# Patient Record
Sex: MALE | Race: WHITE | NOT HISPANIC OR LATINO | Employment: OTHER | URBAN - NONMETROPOLITAN AREA
[De-identification: names, ages, dates, MRNs, and addresses within clinical notes are randomized per-mention and may not be internally consistent; named-entity substitution may affect disease eponyms.]

---

## 2019-07-07 ENCOUNTER — APPOINTMENT (OUTPATIENT)
Dept: GENERAL RADIOLOGY | Facility: HOSPITAL | Age: 64
End: 2019-07-07

## 2019-07-07 ENCOUNTER — HOSPITAL ENCOUNTER (EMERGENCY)
Facility: HOSPITAL | Age: 64
Discharge: HOME | End: 2019-07-07
Attending: EMERGENCY MEDICINE | Admitting: EMERGENCY MEDICINE

## 2019-07-07 VITALS
OXYGEN SATURATION: 97 % | RESPIRATION RATE: 18 BRPM | WEIGHT: 190 LBS | SYSTOLIC BLOOD PRESSURE: 139 MMHG | HEIGHT: 66 IN | BODY MASS INDEX: 30.53 KG/M2 | DIASTOLIC BLOOD PRESSURE: 88 MMHG | HEART RATE: 82 BPM | TEMPERATURE: 97.8 F

## 2019-07-07 DIAGNOSIS — R06.00 DYSPNEA, UNSPECIFIED TYPE: Primary | ICD-10-CM

## 2019-07-07 DIAGNOSIS — J44.9 CHRONIC OBSTRUCTIVE PULMONARY DISEASE, UNSPECIFIED COPD TYPE (HCC): ICD-10-CM

## 2019-07-07 LAB
ALBUMIN SERPL-MCNC: 4.1 G/DL (ref 3.5–5)
ALBUMIN/GLOB SERPL: 1.3 G/DL (ref 1.1–2.5)
ALP SERPL-CCNC: 84 U/L (ref 24–120)
ALT SERPL W P-5'-P-CCNC: 24 U/L (ref 0–54)
ANION GAP SERPL CALCULATED.3IONS-SCNC: 8 MMOL/L (ref 4–13)
AST SERPL-CCNC: 31 U/L (ref 7–45)
BASOPHILS # BLD AUTO: 0.07 10*3/MM3 (ref 0–0.2)
BASOPHILS NFR BLD AUTO: 0.7 % (ref 0–2)
BILIRUB SERPL-MCNC: 0.9 MG/DL (ref 0.1–1)
BUN BLD-MCNC: 17 MG/DL (ref 5–21)
BUN/CREAT SERPL: 17.5 (ref 7–25)
CALCIUM SPEC-SCNC: 9.3 MG/DL (ref 8.4–10.4)
CHLORIDE SERPL-SCNC: 102 MMOL/L (ref 98–110)
CO2 SERPL-SCNC: 27 MMOL/L (ref 24–31)
CREAT BLD-MCNC: 0.97 MG/DL (ref 0.5–1.4)
D DIMER PPP FEU-MCNC: 0.42 MG/L (FEU) (ref 0–0.5)
DEPRECATED RDW RBC AUTO: 38.7 FL (ref 40–54)
EOSINOPHIL # BLD AUTO: 0.07 10*3/MM3 (ref 0–0.7)
EOSINOPHIL NFR BLD AUTO: 0.7 % (ref 0–4)
ERYTHROCYTE [DISTWIDTH] IN BLOOD BY AUTOMATED COUNT: 12.7 % (ref 12–15)
GFR SERPL CREATININE-BSD FRML MDRD: 78 ML/MIN/1.73
GLOBULIN UR ELPH-MCNC: 3.1 GM/DL
GLUCOSE BLD-MCNC: 133 MG/DL (ref 70–100)
HCT VFR BLD AUTO: 49.1 % (ref 40–52)
HGB BLD-MCNC: 16.8 G/DL (ref 14–18)
IMM GRANULOCYTES # BLD AUTO: 0.05 10*3/MM3 (ref 0–0.05)
IMM GRANULOCYTES NFR BLD AUTO: 0.5 % (ref 0–5)
LYMPHOCYTES # BLD AUTO: 1.4 10*3/MM3 (ref 0.72–4.86)
LYMPHOCYTES NFR BLD AUTO: 14.7 % (ref 15–45)
MAGNESIUM SERPL-MCNC: 1.9 MG/DL (ref 1.4–2.2)
MCH RBC QN AUTO: 28.6 PG (ref 28–32)
MCHC RBC AUTO-ENTMCNC: 34.2 G/DL (ref 33–36)
MCV RBC AUTO: 83.6 FL (ref 82–95)
MONOCYTES # BLD AUTO: 0.89 10*3/MM3 (ref 0.19–1.3)
MONOCYTES NFR BLD AUTO: 9.3 % (ref 4–12)
NEUTROPHILS # BLD AUTO: 7.05 10*3/MM3 (ref 1.87–8.4)
NEUTROPHILS NFR BLD AUTO: 74.1 % (ref 39–78)
NRBC BLD AUTO-RTO: 0 /100 WBC (ref 0–0.2)
NT-PROBNP SERPL-MCNC: 793 PG/ML (ref 0–900)
PLATELET # BLD AUTO: 269 10*3/MM3 (ref 130–400)
PMV BLD AUTO: 10.4 FL (ref 6–12)
POTASSIUM BLD-SCNC: 4.4 MMOL/L (ref 3.5–5.3)
PROT SERPL-MCNC: 7.2 G/DL (ref 6.3–8.7)
RBC # BLD AUTO: 5.87 10*6/MM3 (ref 4.8–5.9)
SODIUM BLD-SCNC: 137 MMOL/L (ref 135–145)
TROPONIN I SERPL-MCNC: 0.05 NG/ML (ref 0–0.03)
TROPONIN I SERPL-MCNC: 0.05 NG/ML (ref 0–0.03)
WBC NRBC COR # BLD: 9.53 10*3/MM3 (ref 4.8–10.8)

## 2019-07-07 PROCEDURE — 36415 COLL VENOUS BLD VENIPUNCTURE: CPT

## 2019-07-07 PROCEDURE — 83735 ASSAY OF MAGNESIUM: CPT | Performed by: EMERGENCY MEDICINE

## 2019-07-07 PROCEDURE — 85025 COMPLETE CBC W/AUTO DIFF WBC: CPT | Performed by: EMERGENCY MEDICINE

## 2019-07-07 PROCEDURE — 93010 ELECTROCARDIOGRAM REPORT: CPT | Performed by: INTERNAL MEDICINE

## 2019-07-07 PROCEDURE — 84484 ASSAY OF TROPONIN QUANT: CPT | Performed by: EMERGENCY MEDICINE

## 2019-07-07 PROCEDURE — 96375 TX/PRO/DX INJ NEW DRUG ADDON: CPT

## 2019-07-07 PROCEDURE — 71045 X-RAY EXAM CHEST 1 VIEW: CPT

## 2019-07-07 PROCEDURE — 85379 FIBRIN DEGRADATION QUANT: CPT | Performed by: EMERGENCY MEDICINE

## 2019-07-07 PROCEDURE — 25010000002 METHYLPREDNISOLONE PER 125 MG: Performed by: EMERGENCY MEDICINE

## 2019-07-07 PROCEDURE — 83880 ASSAY OF NATRIURETIC PEPTIDE: CPT | Performed by: EMERGENCY MEDICINE

## 2019-07-07 PROCEDURE — 96374 THER/PROPH/DIAG INJ IV PUSH: CPT

## 2019-07-07 PROCEDURE — 25010000002 LORAZEPAM PER 2 MG: Performed by: EMERGENCY MEDICINE

## 2019-07-07 PROCEDURE — 80053 COMPREHEN METABOLIC PANEL: CPT | Performed by: EMERGENCY MEDICINE

## 2019-07-07 PROCEDURE — 99284 EMERGENCY DEPT VISIT MOD MDM: CPT

## 2019-07-07 PROCEDURE — 93005 ELECTROCARDIOGRAM TRACING: CPT | Performed by: EMERGENCY MEDICINE

## 2019-07-07 RX ORDER — SODIUM CHLORIDE 0.9 % (FLUSH) 0.9 %
10 SYRINGE (ML) INJECTION AS NEEDED
Status: DISCONTINUED | OUTPATIENT
Start: 2019-07-07 | End: 2019-07-07 | Stop reason: HOSPADM

## 2019-07-07 RX ORDER — LORAZEPAM 2 MG/ML
1 INJECTION INTRAMUSCULAR ONCE
Status: COMPLETED | OUTPATIENT
Start: 2019-07-07 | End: 2019-07-07

## 2019-07-07 RX ORDER — CLONIDINE HYDROCHLORIDE 0.1 MG/1
0.2 TABLET ORAL ONCE
Status: COMPLETED | OUTPATIENT
Start: 2019-07-07 | End: 2019-07-07

## 2019-07-07 RX ORDER — PREDNISONE 20 MG/1
20 TABLET ORAL 2 TIMES DAILY
Qty: 14 TABLET | Refills: 0 | Status: SHIPPED | OUTPATIENT
Start: 2019-07-07 | End: 2022-05-25

## 2019-07-07 RX ORDER — LISINOPRIL 20 MG/1
20 TABLET ORAL DAILY
Qty: 30 TABLET | Refills: 0 | Status: SHIPPED | OUTPATIENT
Start: 2019-07-07 | End: 2022-07-27

## 2019-07-07 RX ORDER — ALBUTEROL SULFATE 90 UG/1
2 AEROSOL, METERED RESPIRATORY (INHALATION) EVERY 4 HOURS PRN
Qty: 1 INHALER | Refills: 0 | Status: SHIPPED | OUTPATIENT
Start: 2019-07-07

## 2019-07-07 RX ORDER — METOPROLOL TARTRATE 50 MG/1
50 TABLET, FILM COATED ORAL 2 TIMES DAILY
Qty: 60 TABLET | Refills: 0 | Status: SHIPPED | OUTPATIENT
Start: 2019-07-07 | End: 2022-05-25

## 2019-07-07 RX ORDER — METHYLPREDNISOLONE SODIUM SUCCINATE 125 MG/2ML
125 INJECTION, POWDER, LYOPHILIZED, FOR SOLUTION INTRAMUSCULAR; INTRAVENOUS ONCE
Status: COMPLETED | OUTPATIENT
Start: 2019-07-07 | End: 2019-07-07

## 2019-07-07 RX ADMIN — NITROGLYCERIN 1 INCH: 20 OINTMENT TOPICAL at 12:07

## 2019-07-07 RX ADMIN — LORAZEPAM 1 MG: 2 INJECTION INTRAMUSCULAR; INTRAVENOUS at 12:07

## 2019-07-07 RX ADMIN — METHYLPREDNISOLONE SODIUM SUCCINATE 125 MG: 125 INJECTION, POWDER, FOR SOLUTION INTRAMUSCULAR; INTRAVENOUS at 14:08

## 2019-07-07 RX ADMIN — CLONIDINE HYDROCHLORIDE 0.2 MG: 0.1 TABLET ORAL at 10:34

## 2019-07-07 NOTE — ED PROVIDER NOTES
Subjective   Patient complains of increasing shortness of breath since yesterday and said it was bad last night.  He could not lay down because of the shortness of breath.  He says he has been told he had congestive heart failure in the past and was put on fluid pills for period of time but his regular physician has not continued that.  He has been out of his blood pressure medicine because he has not made his trip back to his family physician.  He does smoke.  Where he could be to be checked out.        History provided by:  Patient and spouse   used: No    Shortness of Breath   Severity:  Moderate  Onset quality:  Gradual  Duration:  1 day  Timing:  Constant  Progression:  Worsening  Chronicity:  Recurrent  Context: not activity, not animal exposure, not emotional upset, not fumes, not known allergens, not occupational exposure, not pollens, not smoke exposure, not strong odors, not URI and not weather changes    Relieved by:  Nothing  Exacerbated by: laying supine.  Ineffective treatments:  None tried  Associated symptoms: PND    Associated symptoms: no abdominal pain, no chest pain, no claudication, no cough, no diaphoresis, no ear pain, no fever, no headaches, no hemoptysis, no neck pain, no rash, no sore throat, no sputum production, no syncope, no swollen glands, no vomiting and no wheezing    Risk factors: prolonged immobilization and tobacco use    Risk factors: no recent alcohol use, no family hx of DVT, no hx of cancer, no hx of PE/DVT, no obesity, no oral contraceptive use and no recent surgery        Review of Systems   Constitutional: Negative.  Negative for diaphoresis and fever.   HENT: Negative.  Negative for ear pain and sore throat.    Respiratory: Positive for shortness of breath. Negative for cough, hemoptysis, sputum production and wheezing.    Cardiovascular: Positive for PND. Negative for chest pain, claudication and syncope.   Gastrointestinal: Negative.  Negative for  abdominal pain and vomiting.   Genitourinary: Negative.    Musculoskeletal: Negative.  Negative for neck pain.   Skin: Negative for rash.   Neurological: Negative.  Negative for headaches.   Psychiatric/Behavioral: Negative.    All other systems reviewed and are negative.      No past medical history on file.    No Known Allergies    No past surgical history on file.    No family history on file.    Social History     Socioeconomic History   • Marital status: Single     Spouse name: Not on file   • Number of children: Not on file   • Years of education: Not on file   • Highest education level: Not on file       Prior to Admission medications    Not on File       Medications   sodium chloride 0.9 % flush 10 mL (not administered)   CloNIDine (CATAPRES) tablet 0.2 mg (0.2 mg Oral Given 7/7/19 1034)   nitroglycerin (NITROSTAT) ointment 1 inch (1 inch Topical Given 7/7/19 1207)   LORazepam (ATIVAN) injection 1 mg (1 mg Intravenous Given 7/7/19 1207)   methylPREDNISolone sodium succinate (SOLU-Medrol) injection 125 mg (125 mg Intravenous Given 7/7/19 1408)       Vitals:    07/07/19 1402   BP: 151/89   Pulse: 88   Resp:    Temp:    SpO2: 95%         Objective   Physical Exam   Constitutional: He is oriented to person, place, and time. He appears well-developed and well-nourished.   HENT:   Head: Normocephalic and atraumatic.   Neck: Normal range of motion. Neck supple.   Cardiovascular: Normal rate and normal heart sounds.   Pulmonary/Chest: Effort normal. He has rales in the right lower field and the left lower field.   Abdominal: Soft. Bowel sounds are normal.   Musculoskeletal: Normal range of motion.        Right lower leg: Normal.   Neurological: He is alert and oriented to person, place, and time.   Psychiatric: He has a normal mood and affect. His behavior is normal.   Nursing note and vitals reviewed.      Procedures         Lab Results (last 24 hours)     Procedure Component Value Units Date/Time    CBC &  Differential [[id number]] Collected:  07/07/19 1035    Specimen:  Blood Updated:  07/07/19 1117    Narrative:       The following orders were created for panel order CBC & Differential.  Procedure                               Abnormality         Status                     ---------                               -----------         ------                     CBC Auto Differential[[id number]]         Abnormal            Final result                 Please view results for these tests on the individual orders.    Comprehensive Metabolic Panel [[id number]]  (Abnormal) Collected:  07/07/19 1035    Specimen:  Blood from Hand, Left Updated:  07/07/19 1128     Glucose 133 mg/dL      BUN 17 mg/dL      Creatinine 0.97 mg/dL      Sodium 137 mmol/L      Potassium 4.4 mmol/L      Chloride 102 mmol/L      CO2 27.0 mmol/L      Calcium 9.3 mg/dL      Total Protein 7.2 g/dL      Albumin 4.10 g/dL      ALT (SGPT) 24 U/L      AST (SGOT) 31 U/L      Alkaline Phosphatase 84 U/L      Total Bilirubin 0.9 mg/dL      eGFR Non African Amer 78 mL/min/1.73      Globulin 3.1 gm/dL      A/G Ratio 1.3 g/dL      BUN/Creatinine Ratio 17.5     Anion Gap 8.0 mmol/L     Narrative:       GFR Normal >60  Chronic Kidney Disease <60  Kidney Failure <15    D-dimer, Quantitative [[id number]]  (Normal) Collected:  07/07/19 1035    Specimen:  Blood from Hand, Left Updated:  07/07/19 1127     D-Dimer, Quantitative 0.42 mg/L (FEU)     Narrative:       Reference Range is 0-0.50 mg/L FEU. However, results <0.50 mg/L FEU tends to rule out DVT or PE. Results >0.50 mg/L FEU are not useful in predicting absence or presence of DVT or PE.    BNP [[id number]]  (Normal) Collected:  07/07/19 1035    Specimen:  Blood from Hand, Left Updated:  07/07/19 1142     proBNP 793.0 pg/mL     Troponin [[id number]]  (Abnormal) Collected:  07/07/19 1035    Specimen:  Blood from Hand, Left Updated:  07/07/19 1142     Troponin I 0.054 ng/mL     Magnesium [[id number]]  (Normal) Collected:  07/07/19  1035    Specimen:  Blood from Hand, Left Updated:  07/07/19 1128     Magnesium 1.9 mg/dL     CBC Auto Differential [[id number]]  (Abnormal) Collected:  07/07/19 1035    Specimen:  Blood from Hand, Left Updated:  07/07/19 1117     WBC 9.53 10*3/mm3      RBC 5.87 10*6/mm3      Hemoglobin 16.8 g/dL      Hematocrit 49.1 %      MCV 83.6 fL      MCH 28.6 pg      MCHC 34.2 g/dL      RDW 12.7 %      RDW-SD 38.7 fl      MPV 10.4 fL      Platelets 269 10*3/mm3      Neutrophil % 74.1 %      Lymphocyte % 14.7 %      Monocyte % 9.3 %      Eosinophil % 0.7 %      Basophil % 0.7 %      Immature Grans % 0.5 %      Neutrophils, Absolute 7.05 10*3/mm3      Lymphocytes, Absolute 1.40 10*3/mm3      Monocytes, Absolute 0.89 10*3/mm3      Eosinophils, Absolute 0.07 10*3/mm3      Basophils, Absolute 0.07 10*3/mm3      Immature Grans, Absolute 0.05 10*3/mm3      nRBC 0.0 /100 WBC     Troponin [[id number]]  (Abnormal) Collected:  07/07/19 1309    Specimen:  Blood Updated:  07/07/19 1348     Troponin I 0.054 ng/mL           XR Chest 1 View   Final Result   1. No acute cardiopulmonary process.       This report was finalized on 07/07/2019 10:51 by Dr. Raghavendra Cross MD.          ED Course  ED Course as of Jul 07 1415   Sun Jul 07, 2019   1414 Told the patient that his troponin has stayed stable that should not be a problem.  His chest x-ray and BNP tell me it is not congestive heart failure.  I feel like this has to be related to his smoking and COPD.  We will treat him as such.  He is feeling better now so he will be discharged in stable condition.  [TR]      ED Course User Index  [TR] Silver De Los Santos Jr., MD          MDM  Number of Diagnoses or Management Options  Chronic obstructive pulmonary disease, unspecified COPD type (CMS/HCC): new and requires workup  Dyspnea, unspecified type: new and requires workup     Amount and/or Complexity of Data Reviewed  Clinical lab tests: ordered and reviewed  Tests in the radiology section of CPT®: ordered  and reviewed  Tests in the medicine section of CPT®: ordered and reviewed  Decide to obtain previous medical records or to obtain history from someone other than the patient: yes    Risk of Complications, Morbidity, and/or Mortality  Presenting problems: moderate  Diagnostic procedures: moderate  Management options: moderate    Patient Progress  Patient progress: stable      Final diagnoses:   Dyspnea, unspecified type   Chronic obstructive pulmonary disease, unspecified COPD type (CMS/[facility])          Silver De Los Santos Jr., MD  07/07/19 7339

## 2020-08-27 ENCOUNTER — TRANSCRIBE ORDERS (OUTPATIENT)
Dept: ADMINISTRATIVE | Facility: HOSPITAL | Age: 65
End: 2020-08-27

## 2020-08-27 DIAGNOSIS — F17.200 TOBACCO USE DISORDER: Primary | ICD-10-CM

## 2020-09-02 ENCOUNTER — HOSPITAL ENCOUNTER (OUTPATIENT)
Dept: CT IMAGING | Facility: HOSPITAL | Age: 65
Discharge: HOME | End: 2020-09-02
Admitting: NURSE PRACTITIONER

## 2020-09-02 ENCOUNTER — DOCUMENTATION (OUTPATIENT)
Dept: CT IMAGING | Facility: HOSPITAL | Age: 65
End: 2020-09-02

## 2020-09-02 DIAGNOSIS — F17.200 TOBACCO USE DISORDER: ICD-10-CM

## 2020-09-02 PROCEDURE — G0297 LDCT FOR LUNG CA SCREEN: HCPCS

## 2021-01-21 ENCOUNTER — TRANSCRIBE ORDERS (OUTPATIENT)
Dept: ADMINISTRATIVE | Facility: HOSPITAL | Age: 66
End: 2021-01-21

## 2021-01-21 DIAGNOSIS — I10 ESSENTIAL HYPERTENSION, MALIGNANT: Primary | ICD-10-CM

## 2021-01-27 ENCOUNTER — HOSPITAL ENCOUNTER (OUTPATIENT)
Dept: ULTRASOUND IMAGING | Facility: HOSPITAL | Age: 66
Discharge: HOME | End: 2021-01-27
Admitting: NURSE PRACTITIONER

## 2021-01-27 PROCEDURE — 76706 US ABDL AORTA SCREEN AAA: CPT

## 2021-10-29 ENCOUNTER — TRANSCRIBE ORDERS (OUTPATIENT)
Dept: ADMINISTRATIVE | Facility: HOSPITAL | Age: 66
End: 2021-10-29

## 2021-10-29 ENCOUNTER — TELEPHONE (OUTPATIENT)
Dept: CT IMAGING | Facility: HOSPITAL | Age: 66
End: 2021-10-29

## 2021-11-05 ENCOUNTER — TRANSCRIBE ORDERS (OUTPATIENT)
Dept: ADMINISTRATIVE | Facility: HOSPITAL | Age: 66
End: 2021-11-05

## 2021-11-05 DIAGNOSIS — Z12.2 ENCOUNTER FOR SCREENING FOR MALIGNANT NEOPLASM OF RESPIRATORY ORGANS: Primary | ICD-10-CM

## 2021-11-18 ENCOUNTER — DOCUMENTATION (OUTPATIENT)
Dept: CT IMAGING | Facility: HOSPITAL | Age: 66
End: 2021-11-18

## 2021-11-18 ENCOUNTER — HOSPITAL ENCOUNTER (OUTPATIENT)
Dept: CT IMAGING | Facility: HOSPITAL | Age: 66
Discharge: HOME | End: 2021-11-18
Admitting: NURSE PRACTITIONER

## 2021-11-18 DIAGNOSIS — Z12.2 ENCOUNTER FOR SCREENING FOR MALIGNANT NEOPLASM OF RESPIRATORY ORGANS: ICD-10-CM

## 2021-11-18 PROCEDURE — 71271 CT THORAX LUNG CANCER SCR C-: CPT

## 2021-11-19 ENCOUNTER — DOCUMENTATION (OUTPATIENT)
Dept: CT IMAGING | Facility: HOSPITAL | Age: 66
End: 2021-11-19

## 2022-02-07 ENCOUNTER — DOCUMENTATION (OUTPATIENT)
Dept: CT IMAGING | Facility: HOSPITAL | Age: 67
End: 2022-02-07

## 2022-02-07 NOTE — PROGRESS NOTES
Patient called and left me a message. I tried to return phone call to the cell phone, unable to get through. I called significant other Philomena and she said she would have him give me a call.

## 2022-04-26 ENCOUNTER — TRANSCRIBE ORDERS (OUTPATIENT)
Dept: ADMINISTRATIVE | Facility: HOSPITAL | Age: 67
End: 2022-04-26

## 2022-04-26 DIAGNOSIS — I70.211 ATHEROSCLEROSIS OF NATIVE ARTERY OF RIGHT LOWER EXTREMITY WITH INTERMITTENT CLAUDICATION: Primary | ICD-10-CM

## 2022-05-16 ENCOUNTER — APPOINTMENT (OUTPATIENT)
Dept: ULTRASOUND IMAGING | Facility: HOSPITAL | Age: 67
End: 2022-05-16

## 2022-05-18 ENCOUNTER — HOSPITAL ENCOUNTER (OUTPATIENT)
Dept: ULTRASOUND IMAGING | Facility: HOSPITAL | Age: 67
Discharge: HOME | End: 2022-05-18
Admitting: NURSE PRACTITIONER

## 2022-05-18 DIAGNOSIS — I70.211 ATHEROSCLEROSIS OF NATIVE ARTERY OF RIGHT LOWER EXTREMITY WITH INTERMITTENT CLAUDICATION: ICD-10-CM

## 2022-05-18 PROCEDURE — 93923 UPR/LXTR ART STDY 3+ LVLS: CPT

## 2022-05-24 ENCOUNTER — TELEPHONE (OUTPATIENT)
Dept: VASCULAR SURGERY | Facility: CLINIC | Age: 67
End: 2022-05-24

## 2022-05-25 ENCOUNTER — OFFICE VISIT (OUTPATIENT)
Dept: VASCULAR SURGERY | Facility: CLINIC | Age: 67
End: 2022-05-25

## 2022-05-25 VITALS
OXYGEN SATURATION: 97 % | SYSTOLIC BLOOD PRESSURE: 136 MMHG | RESPIRATION RATE: 18 BRPM | DIASTOLIC BLOOD PRESSURE: 82 MMHG | HEART RATE: 78 BPM | HEIGHT: 67 IN | WEIGHT: 190 LBS | BODY MASS INDEX: 29.82 KG/M2

## 2022-05-25 DIAGNOSIS — E78.49 OTHER HYPERLIPIDEMIA: ICD-10-CM

## 2022-05-25 DIAGNOSIS — Z72.0 TOBACCO ABUSE: ICD-10-CM

## 2022-05-25 DIAGNOSIS — I10 PRIMARY HYPERTENSION: ICD-10-CM

## 2022-05-25 DIAGNOSIS — I74.09 AORTOILIAC OCCLUSIVE DISEASE: Primary | ICD-10-CM

## 2022-05-25 PROCEDURE — 99204 OFFICE O/P NEW MOD 45 MIN: CPT | Performed by: NURSE PRACTITIONER

## 2022-05-25 RX ORDER — AMLODIPINE BESYLATE 5 MG/1
5 TABLET ORAL
COMMUNITY
Start: 2022-04-14 | End: 2023-02-28 | Stop reason: SDUPTHER

## 2022-05-25 RX ORDER — FLUOXETINE 10 MG/1
10 CAPSULE ORAL DAILY
COMMUNITY

## 2022-05-25 RX ORDER — ATORVASTATIN CALCIUM 80 MG/1
80 TABLET, FILM COATED ORAL
COMMUNITY
Start: 2022-04-14

## 2022-05-25 RX ORDER — DIAZEPAM 10 MG/1
10 TABLET ORAL 3 TIMES DAILY PRN
COMMUNITY
Start: 2022-05-13

## 2022-05-25 RX ORDER — METOPROLOL SUCCINATE 100 MG/1
100 TABLET, EXTENDED RELEASE ORAL
COMMUNITY
Start: 2022-04-14

## 2022-05-25 RX ORDER — TAMSULOSIN HYDROCHLORIDE 0.4 MG/1
1 CAPSULE ORAL
COMMUNITY
Start: 2022-04-14

## 2022-05-25 NOTE — PROGRESS NOTES
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  Pt had ABIs on 5/18/22 which showed Moderate arterial insufficiency of RLE and severe arterial insufficiency of LLE.  Pt states that his pain level is 10/10 when walking in back, hips and legs. Referred by CARYN Alfonso   • Smoker     Pt verified Current Smoker         Dear CARYN Chaves:      HPI  I had the pleasure of seeing your patient Campbell Casas in the office today.  Thank you kindly for this consultation.  As you recall, Campbell Casas is a 67 y.o.  male who you are currently following for routine health maintenance.  He does report he has been having pain to his legs for couple years but has worsened.  Upon questioning, he really describes bilateral hip and buttock pain.  He reports they just \"lock up\".  He is a current daily smoker.  He does take Lipitor daily.  He did have noninvasive testing performed, which I did review.    Past Medical History:   Diagnosis Date   • Anxiety    • Depression    • Hypertension        History reviewed. No pertinent surgical history.    History reviewed. No pertinent family history.    Social History     Socioeconomic History   • Marital status: Significant Other   Tobacco Use   • Smoking status: Current Every Day Smoker     Packs/day: 1.25     Start date: 1975   • Smokeless tobacco: Never Used   Substance and Sexual Activity   • Alcohol use: Not Currently   • Drug use: Never   • Sexual activity: Defer       No Known Allergies    Current Outpatient Medications   Medication Instructions   • albuterol sulfate  (90 Base) MCG/ACT inhaler 2 puffs, Inhalation, Every 4 Hours PRN   • amLODIPine (NORVASC) 5 mg, Oral, Every Night at Bedtime   • atorvastatin (LIPITOR) 80 mg, Oral, Every Night at Bedtime   • diazePAM (VALIUM) 10 mg, " "Oral, 3 Times Daily PRN   • FLUoxetine (PROZAC) 10 mg, Oral, Daily   • lisinopril (PRINIVIL,ZESTRIL) 20 mg, Oral, Daily   • metoprolol succinate XL (TOPROL-XL) 100 mg, Oral, Every Night at Bedtime   • tamsulosin (FLOMAX) 0.4 MG capsule 24 hr capsule 1 capsule, Oral, Every Night at Bedtime         Review of Systems   Constitutional: Negative.    HENT: Negative.    Eyes: Negative.    Respiratory: Negative.    Cardiovascular: Negative.    Gastrointestinal: Negative.    Endocrine: Negative.    Genitourinary: Negative.    Musculoskeletal: Positive for arthralgias.        Bilateral hip pain   Skin: Negative.    Allergic/Immunologic: Negative.    Neurological: Negative.    Hematological: Negative.    Psychiatric/Behavioral: Negative.    All other systems reviewed and are negative.      /82 (BP Location: Left arm, Patient Position: Sitting, Cuff Size: Adult)   Pulse 78   Resp 18   Ht 170.2 cm (67\")   Wt 86.2 kg (190 lb)   SpO2 97%   BMI 29.76 kg/m²   Physical Exam  Vitals and nursing note reviewed.   Constitutional:       Appearance: He is well-developed.   HENT:      Head: Normocephalic and atraumatic.   Eyes:      General: No scleral icterus.     Pupils: Pupils are equal, round, and reactive to light.   Neck:      Thyroid: No thyromegaly.   Cardiovascular:      Rate and Rhythm: Normal rate and regular rhythm.      Pulses:           Femoral pulses are 0 on the right side and 1+ on the left side.       Dorsalis pedis pulses are detected w/ Doppler on the right side.        Posterior tibial pulses are 2+ on the right side.      Heart sounds: Normal heart sounds.      Comments: Right: doppler DP/peroneal  Left: doppler DP/PT/peroneal  Pulmonary:      Effort: Pulmonary effort is normal.      Breath sounds: Normal breath sounds.   Abdominal:      General: Bowel sounds are normal.      Palpations: Abdomen is soft.   Musculoskeletal:         General: Normal range of motion.      Cervical back: Normal range of motion " and neck supple.   Skin:     General: Skin is warm and dry.   Neurological:      Mental Status: He is alert and oriented to person, place, and time.   Psychiatric:         Behavior: Behavior normal.         Thought Content: Thought content normal.         Judgment: Judgment normal.         US Ankle / Brachial Indices Extremity Complete    Result Date: 5/18/2022  Narrative: EXAMINATION: US ANKLE / BRACHIAL INDICES EXTREMITY COMPLETE- 5/18/2022 3:38 PM CDT  HISTORY: i70.211; I70.211-Atherosclerosis of native arteries of extremities with intermittent claudication, right leg US ANKLE / BRACHIAL INDICES EXTREMITY COMPLETE- 5/18/2022 2:17 PM CDT  REPORT: Bilateral sonographic lower extremity arterial evaluation was performed with multi-level Doppler analysis and pulse volume recordings with segmental pressures obtained at rest and stress.  The right DK equals 0.68. The Doppler waveforms are biphasic. These findings are consistent with moderate arterial insufficiency of the right lower extremity at rest.  The left DK equals 0.56. The Doppler waveforms are monophasic. These findings are consistent with severe arterial insufficiency of the left lower extremity at rest.  No exercise protocol was performed.      Impression:  1.  Moderate arterial insufficiency of the right lower extremity at rest. 2.  Severe arterial insufficiency of the left lower extremity at rest. 3. Vascular surgery consult is recommended.      This report was finalized on 05/18/2022 15:41 by Dr. Luis F Ramirez MD.      Patient Active Problem List   Diagnosis   • Tobacco abuse   • Hypertension   • Other hyperlipidemia         ICD-10-CM ICD-9-CM   1. Aortoiliac occlusive disease (HCC)  I74.09 444.09   2. Tobacco abuse  Z72.0 305.1   3. Primary hypertension  I10 401.9   4. Other hyperlipidemia  E78.49 272.4         Plan: After thoroughly evaluating Campbell Casas, I believe the best course of action is to proceed with further imaging including CTA of the  abdomen pelvis.  He does have Doppler signals present distally.  Most of his complaints seem to be related femoral pulse.  I do not see any previous CAT scans to review.  He does move a bit slower in regards to getting up and down off the table does have complaints of some back discomfort.  This may be contributing to some of his discomfort. I did discuss vascular risk factors as they pertain to the progression of vascular disease including controlling his hypertension, hyperlipidemia, and smoking cessation.  His blood pressure stable on his current medications.  He is maintained on Lipitor for his hyperlipidemia.  Unfortunately, he is a daily smoker and has no desire for smoking at this time.  The patient can continue taking their current medication regimen as previously planned.  This was all discussed in full with complete understanding.    Thank you for allowing me to participate in the care of your patient.  Please do not hesitate with any questions or concerns.  I will keep you aware of any further encounters with Campbell Casas.        Sincerely yours,         CARYN Boyd

## 2022-05-26 NOTE — PROGRESS NOTES
"  5/31/2022       Yaa Fox, APRN  [address] [address]      Campbell Casas  1955    Chief Complaint   Patient presents with   • Follow-up     1 Week Follow Up For Aortoiliac Occlusive Disease. Test [id number] CT pad angiogram abd pelvis w wo. Patient denies any stroke like symptoms.    • Smoker     Patient is a Current Everyday Smoker    • Med Management     Verbally verified medications with patient        Dear Yaa Fox APRN        HPI  I had the pleasure of seeing your patient Campbell Casas in the office today.   As you recall, Campbell Casas is a 67 y.o.  male who you are currently following for routine health maintenance.  He does report he has been having pain to his legs for couple years but has worsened.  Upon questioning, he really describes bilateral hip and buttock pain.  He reports they just \"lock up\".  He is a current daily smoker.  He does take Lipitor daily.  They both hurt equally.  He did have noninvasive testing performed today, which I did review in office.     Review of Systems   Constitutional: Negative.    HENT: Negative.    Eyes: Negative.    Respiratory: Negative.    Cardiovascular: Negative.    Gastrointestinal: Negative.    Endocrine: Negative.    Genitourinary: Negative.    Musculoskeletal: Positive for arthralgias.        Bilateral hip pain   Skin: Negative.    Allergic/Immunologic: Negative.    Neurological: Negative.    Hematological: Negative.    Psychiatric/Behavioral: Negative.    All other systems reviewed and are negative.      /72 (BP Location: Right arm, Patient Position: Sitting, Cuff Size: Adult)   Pulse 68   Ht 170.2 cm (67\")   Wt 86.2 kg (190 lb)   SpO2 97%   BMI 29.76 kg/m²   Physical Exam  Vitals and nursing note reviewed.   Constitutional:       Appearance: Normal appearance. He is well-developed and overweight.   HENT:      Head: Normocephalic and atraumatic.   Eyes:      General: No scleral icterus.     Pupils: Pupils are equal, " round, and reactive to light.   Neck:      Thyroid: No thyromegaly.   Cardiovascular:      Rate and Rhythm: Normal rate and regular rhythm.      Pulses:           Femoral pulses are 1+ on the right side and 1+ on the left side.       Dorsalis pedis pulses are detected w/ Doppler on the right side.        Posterior tibial pulses are 2+ on the right side.      Heart sounds: Normal heart sounds.      Comments: Right: doppler DP/peroneal  Left: doppler DP/PT/peroneal  Pulmonary:      Effort: Pulmonary effort is normal.      Breath sounds: Normal breath sounds.   Abdominal:      General: Bowel sounds are normal.      Palpations: Abdomen is soft.   Musculoskeletal:         General: Normal range of motion.      Cervical back: Normal range of motion and neck supple.   Skin:     General: Skin is warm and dry.   Neurological:      General: No focal deficit present.      Mental Status: He is alert and oriented to person, place, and time.   Psychiatric:         Mood and Affect: Mood normal.         Behavior: Behavior normal. Behavior is cooperative.         Thought Content: Thought content normal.         Judgment: Judgment normal.         CT Head Without Contrast    Result Date: 5/28/2022  Narrative: EXAMINATION: CT head without contrast 5/28/2022  HISTORY: Head trauma  FINDINGS: Multiple contiguous axials are obtained from the skull base to the vertex per protocol findings contrast-enhancement with reformatted images obtained in the sagittal and coronal projections from the original data set.  There is evidence of a previous left posterior cerebral artery distribution infarct with encephalomalacia. There is also a focus of hypodensity within the right basal ganglia suggesting previous lacunar infarction as well as remote right cerebellar hemisphere infarct with focal encephalomalacia.. There is no evidence of mass, mass effect or shift of the midline. No evidence of acute infarct or hemorrhage.  No acute calvarial  abnormalities are present. The visualized mastoid air cells and paranasal sinuses are normally aerated. The orbits are intact.      Impression: 1.. Mild atrophy and small vessel disease. 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 There is also a remote infarct involving the right cerebellar hemisphere. 2. No evidence of acute posttraumatic injury to the brain. This report was finalized on 05/28/2022 14:11 by Dr. Alvarez Betancourt MD.    CT Cervical Spine Without Contrast    Result Date: 5/28/2022  Narrative: CT CERVICAL SPINE WO CONTRAST- 5/28/2022 1:34 PM CDT  HISTORY: Neck trauma (Age >= 65y)  COMPARISON: None  DOSE LENGTH PRODUCT: 442 mGy cm. All CT scans are performed using dose optimization techniques as appropriate to the performed exam and including at least one of the following: Automated exposure control, adjustment of the mA and/or kV according to size, and the use of the iterative reconstruction technique.  TECHNIQUE: Serial helical tomographic images of the cervical spine were obtained without the use of intravenous contrast. Additionally, sagittal and coronal reformatted images were also provided for review.  FINDINGS: Alignment: Normal.  Bones: There is no evidence of fracture. Vertebral body heights are maintained.  Disc spaces: Maintained.  Canal and neuroforamina: Left-sided foraminal narrowing is noted at C3-C4 related to asymmetric facet hypertrophy and spurring as well as uncinate spurring.  Soft tissues: Unremarkable.  Lung apices: Clear. No pneumothorax.      Impression: 1. No evidence of acute osseous injury or malalignment in the cervical spine. 2. Left-sided foraminal narrowing at C3-C4 related to facet and uncovertebral hypertrophy and spurring.   This report was finalized on 05/28/2022 14:07 by Dr. Alvarez Betancourt MD.    CT Angiogram Abdomen Pelvis    Result Date: 5/31/2022  Narrative: EXAMINATION: CT ANGIOGRAM ABDOMEN PELVIS-   5/31/2022 8:44 AM CDT   HISTORY: aortoiliac disease; I74.09-Other arterial embolism and thrombosis of abdominal aorta  In order to have a CT radiation dose as low as reasonably achievable Automated Exposure Control was utilized for adjustment of the mA and/or KV according to patient size.  DLP in mGycm= 1433  The CT angiography of the abdomen and pelvis is performed after intravenous contrast enhancement. Images are acquired in axial plane with subsequent 2-D reconstruction in coronal and sagittal planes and 3-D maximum intensity projection reconstruction.  There is no previous study for comparison.  The correlation is made with sonography of the abdomen dated 1/27/2021.  Atheromatous changes of the abdominal aorta and iliac arteries is seen. No aneurysmal dilatation.  An atheromatous plaque is seen at the origin of the celiac trunk. No stenosis. There is a large noncalcified atheromatous plaque in the proximal superior mesenteric artery. Up to 75% long segment stenosis. The remaining superior mesenteric arterial branches appear normal.  There are calcific plaques at the origin of both renal arteries. No significant stenosis.  The origin of the inferior mesenteric artery is not opacified from the abdominal aorta. However it appears to be opacified from the collateral circulation.  A large atheromatous plaque/partial lumen mural thrombosis of the distal abdominal aorta is seen with approximately less than 50% diameter stenosis.  Atheromatous plaques are seen in both common internal and external iliac arteries. There is mild ectasia of the proximal right common iliac artery measuring 13 mm in diameter.  There is a large calcific plaque at the origin of the right superficial femoral artery with high-grade stenosis. The remaining superficial and deep femoral arteries appear unremarkable.  The lung bases seen: The study is unremarkable except for scarring and atelectasis.  The liver and spleen appear unremarkable.  Moderate lobulation of the  right adrenal gland. There are small nodules in both limbs of the right adrenal gland. The left IJ catheter is normal.  Moderate lobulation and renal contour bilaterally seen. No discrete mass. No calculi. No hydronephrosis. The ureters appear normal. The urinary bladder is moderately well distended. No intrinsic abnormality.  The prostate is moderately enlarged with intrinsic calcifications.  There are fat containing inguinal hernias bilaterally. There is a tiny fat-containing umbilical hernia.  The stomach duodenum and small bowel appear normal. A normal appendix is seen. Moderate gas and stool is seen in the colon. No finding to suggest obstruction. There is diverticulosis of the distal colon. No evidence for diverticulitis.  There is no evidence of abdominal or pelvic lymphadenopathy.  Images reviewed in bone window show no acute bony abnormality. Moderate chronic degenerative changes of the lumbar spine are seen.      Impression: 1. Severe atheromatous changes of the abdominal aorta is ectatic and femoral arteries. No aneurysmal dilatation. 2. 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 3. A long segment noncalcific atheromatous plaque in the proximal right superficial femoral artery with up to 75% stenosis. The subsequent superior mesenteric arterial branches are normal. 4. Mild ectasia of the right common iliac artery. 5. Significant, high-grade, stenosis of the proximal right superficial femoral artery. 6. Other nonvascular findings as detailed above. This report was finalized on 05/31/2022 12:03 by Dr. Darryn Kebede MD.    US Ankle / Brachial Indices Extremity Complete    Result Date: 5/18/2022  Narrative: EXAMINATION: US ANKLE / BRACHIAL INDICES EXTREMITY COMPLETE- 5/18/2022 3:38 PM CDT  HISTORY: i70.211; I70.211-Atherosclerosis of native arteries of extremities with intermittent claudication, right leg US ANKLE / BRACHIAL INDICES EXTREMITY  COMPLETE- 5/18/2022 2:17 PM CDT  REPORT: Bilateral sonographic lower extremity arterial evaluation was performed with multi-level Doppler analysis and pulse volume recordings with segmental pressures obtained at rest and stress.  The right DK equals 0.68. The Doppler waveforms are biphasic. These findings are consistent with moderate arterial insufficiency of the right lower extremity at rest.  The left DK equals 0.56. The Doppler waveforms are monophasic. These findings are consistent with severe arterial insufficiency of the left lower extremity at rest.  No exercise protocol was performed.      Impression:  1.  Moderate arterial insufficiency of the right lower extremity at rest. 2.  Severe arterial insufficiency of the left lower extremity at rest. 3. Vascular surgery consult is recommended.      This report was finalized on 05/18/2022 15:41 by Dr. Luis F Ramirez MD.      Patient Active Problem List   Diagnosis   • Tobacco abuse   • Hypertension   • Other hyperlipidemia   • PAD (peripheral artery disease) (HCC)         ICD-10-CM ICD-9-CM   1. Aortoiliac occlusive disease (HCC)  I74.09 444.09   2. Preop testing  Z01.818 V72.84   3. Encounter for monitoring antiplatelet therapy  Z51.81 V58.83    Z79.02 V58.63   4. Primary hypertension  I10 401.9   5. Other hyperlipidemia  E78.49 272.4   6. Tobacco abuse  Z72.0 305.1         Plan: After thoroughly evaluating Campbell Casas, I believe the best course of action is to proceed with a left common femoral endarterectomy with bilateral iliac stenting.  I did review his testing very closely.  Risks of angiogram were discussed.  These include, but are not limited to, bleeding, infection, vessel damage, nerve damage, embolus, and loss of limb.  The patient understands these risks and wishes to proceed with procedure.  I would also like him to begin taking aspirin 81 mg enteric-coated on a daily basis.  I did discuss vascular risk factors as they pertain to the progression  of vascular disease including controlling his hypertension, hyperlipidemia, and smoking cessation.  His blood pressure stable on his current medications.  He is maintained on Lipitor for his hyperlipidemia.  Unfortunately, he is a daily smoker and has no desire for smoking at this time.  The patient can continue taking their current medication regimen as previously planned.  This was all discussed in full with complete understanding.    Thank you for allowing me to participate in the care of your patient.  Please do not hesitate with any questions or concerns.  I will keep you aware of any further encounters with Campbell Casas.        Sincerely yours,         Esequiel Vaz, DO

## 2022-05-27 ENCOUNTER — TELEPHONE (OUTPATIENT)
Dept: VASCULAR SURGERY | Facility: CLINIC | Age: 67
End: 2022-05-27

## 2022-05-27 NOTE — TELEPHONE ENCOUNTER
Tried calling to remind Mr Casas of his appointments for Tuesday, May 31st, 2022. Tried calling to remind Mr Casas to arrive at the [facility] at 8 am for 830 am testing with nothing to eat or drink 6 hours prior and follow up afterwards at 9 am with Dr Vaz.     When calling rang several times with no answer and stated no voicemail box was set up

## 2022-05-28 ENCOUNTER — APPOINTMENT (OUTPATIENT)
Dept: CT IMAGING | Facility: HOSPITAL | Age: 67
End: 2022-05-28

## 2022-05-28 ENCOUNTER — HOSPITAL ENCOUNTER (EMERGENCY)
Facility: HOSPITAL | Age: 67
Discharge: HOME | End: 2022-05-28
Attending: EMERGENCY MEDICINE | Admitting: EMERGENCY MEDICINE

## 2022-05-28 VITALS
HEART RATE: 78 BPM | DIASTOLIC BLOOD PRESSURE: 77 MMHG | BODY MASS INDEX: 30.53 KG/M2 | TEMPERATURE: 98.2 F | OXYGEN SATURATION: 97 % | SYSTOLIC BLOOD PRESSURE: 110 MMHG | RESPIRATION RATE: 16 BRPM | HEIGHT: 66 IN | WEIGHT: 190 LBS

## 2022-05-28 DIAGNOSIS — S00.93XA CONTUSION OF HEAD, UNSPECIFIED PART OF HEAD, INITIAL ENCOUNTER: ICD-10-CM
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
## 2022-05-28 PROCEDURE — 99283 EMERGENCY DEPT VISIT LOW MDM: CPT

## 2022-05-28 PROCEDURE — 70450 CT HEAD/BRAIN W/O DYE: CPT

## 2022-05-28 PROCEDURE — 72125 CT NECK SPINE W/O DYE: CPT

## 2022-05-28 RX ORDER — HYDROCODONE BITARTRATE AND ACETAMINOPHEN 7.5; 325 MG/1; MG/1
1 TABLET ORAL ONCE
Status: COMPLETED | OUTPATIENT
Start: 2022-05-28 | End: 2022-05-28

## 2022-05-28 RX ORDER — HYDROCODONE BITARTRATE AND ACETAMINOPHEN 7.5; 325 MG/1; MG/1
1 TABLET ORAL EVERY 4 HOURS PRN
Qty: 10 TABLET | Refills: 0 | Status: ON HOLD | OUTPATIENT
Start: 2022-05-28 | End: 2022-07-13

## 2022-05-28 RX ADMIN — HYDROCODONE BITARTRATE AND ACETAMINOPHEN 1 TABLET: 7.5; 325 TABLET ORAL at 14:43

## 2022-05-31 ENCOUNTER — HOSPITAL ENCOUNTER (OUTPATIENT)
Dept: CT IMAGING | Facility: HOSPITAL | Age: 67
Discharge: HOME | End: 2022-05-31
Admitting: NURSE PRACTITIONER

## 2022-05-31 ENCOUNTER — OFFICE VISIT (OUTPATIENT)
Dept: VASCULAR SURGERY | Facility: CLINIC | Age: 67
End: 2022-05-31

## 2022-05-31 ENCOUNTER — TELEPHONE (OUTPATIENT)
Dept: VASCULAR SURGERY | Facility: CLINIC | Age: 67
End: 2022-05-31

## 2022-05-31 VITALS
DIASTOLIC BLOOD PRESSURE: 72 MMHG | HEART RATE: 68 BPM | WEIGHT: 190 LBS | OXYGEN SATURATION: 97 % | SYSTOLIC BLOOD PRESSURE: 124 MMHG | HEIGHT: 67 IN | BODY MASS INDEX: 29.82 KG/M2

## 2022-05-31 DIAGNOSIS — Z01.818 PREOP TESTING: ICD-10-CM

## 2022-05-31 DIAGNOSIS — I74.09 AORTOILIAC OCCLUSIVE DISEASE: Primary | ICD-10-CM

## 2022-05-31 DIAGNOSIS — Z51.81 ENCOUNTER FOR MONITORING ANTIPLATELET THERAPY: ICD-10-CM

## 2022-05-31 DIAGNOSIS — I10 PRIMARY HYPERTENSION: ICD-10-CM

## 2022-05-31 DIAGNOSIS — Z72.0 TOBACCO ABUSE: ICD-10-CM

## 2022-05-31 DIAGNOSIS — I74.09 AORTOILIAC OCCLUSIVE DISEASE: ICD-10-CM

## 2022-05-31 DIAGNOSIS — E78.49 OTHER HYPERLIPIDEMIA: ICD-10-CM

## 2022-05-31 DIAGNOSIS — Z79.02 ENCOUNTER FOR MONITORING ANTIPLATELET THERAPY: ICD-10-CM

## 2022-05-31 PROBLEM — I73.9 PAD (PERIPHERAL ARTERY DISEASE) (HCC): Status: ACTIVE | Noted: 2022-05-31

## 2022-05-31 LAB — CREAT BLDA-MCNC: 3.4 MG/DL (ref 0.6–1.3)

## 2022-05-31 PROCEDURE — 0 IOPAMIDOL PER 1 ML: Performed by: NURSE PRACTITIONER

## 2022-05-31 PROCEDURE — 99214 OFFICE O/P EST MOD 30 MIN: CPT | Performed by: SURGERY

## 2022-05-31 PROCEDURE — 82565 ASSAY OF CREATININE: CPT

## 2022-05-31 PROCEDURE — 74174 CTA ABD&PLVS W/CONTRAST: CPT

## 2022-05-31 RX ORDER — ASPIRIN 81 MG/1
81 TABLET ORAL DAILY
Start: 2022-05-31 | End: 2023-02-28

## 2022-05-31 RX ADMIN — IOPAMIDOL 100 ML: 755 INJECTION, SOLUTION INTRAVENOUS at 08:58

## 2022-05-31 NOTE — TELEPHONE ENCOUNTER
Spoke with Daphne at Yaa RUBIN's office in regards to Mr Augusto who had a CT scan done today with contrast and his Creatine come back as 3.4 and we was wondering if Yaa RUBIN would follow and recheck. Daphne advised they would.

## 2022-06-06 ENCOUNTER — TELEPHONE (OUTPATIENT)
Dept: VASCULAR SURGERY | Facility: CLINIC | Age: 67
End: 2022-06-06

## 2022-06-06 PROBLEM — I74.09 AORTOILIAC OCCLUSIVE DISEASE (HCC): Status: ACTIVE | Noted: 2022-06-06

## 2022-06-06 PROBLEM — Z01.818 PREOP TESTING: Status: ACTIVE | Noted: 2022-06-06

## 2022-06-06 NOTE — TELEPHONE ENCOUNTER
SPOKE WITH IDA AT [facility] OFFICE. THEY WERE INFORMED PT WILL BE HAVING SURGERY NEXT WEEK AND PT NEEDS THE FOLLOW UP WITH THEIR OFFICE SET UP. SHE STATED SHE WILL TALK WITH JESS POP AND SET PT UP WITH APT IN THE NEXT FEW DAYS.

## 2022-06-06 NOTE — TELEPHONE ENCOUNTER
SPOKE WITH PATIENT REGARDING SURGERY. PT WAS INFORMED OF PRE WORK ON 06/13 AT 0945. PT WAS ALSO INFORMED OF SURGERY ON 06/15 AT 0700. PT WAS INFORMED OF COVID TEST AND VISITATION. PT STATED UNDERSTANDING OF INSTRUCTIONS AND ALL INFORMATION.

## 2022-06-07 ENCOUNTER — TELEPHONE (OUTPATIENT)
Dept: VASCULAR SURGERY | Facility: CLINIC | Age: 67
End: 2022-06-07

## 2022-06-07 NOTE — TELEPHONE ENCOUNTER
Pt called wanting to know if he should continue to take aspirin before surgery and time of surgery and date.  Pt was informed to continue aspirin, but nothing by mouth the morning of the surgery and surgery 6/15/22 at 7:00.

## 2022-06-13 ENCOUNTER — PRE-ADMISSION TESTING (OUTPATIENT)
Dept: PREADMISSION TESTING | Facility: HOSPITAL | Age: 67
End: 2022-06-13

## 2022-06-13 ENCOUNTER — TELEPHONE (OUTPATIENT)
Dept: VASCULAR SURGERY | Facility: CLINIC | Age: 67
End: 2022-06-13

## 2022-06-13 ENCOUNTER — LAB (OUTPATIENT)
Dept: LAB | Facility: HOSPITAL | Age: 67
End: 2022-06-13

## 2022-06-13 ENCOUNTER — HOSPITAL ENCOUNTER (OUTPATIENT)
Dept: GENERAL RADIOLOGY | Facility: HOSPITAL | Age: 67
Discharge: HOME | End: 2022-06-13

## 2022-06-13 ENCOUNTER — TELEPHONE (OUTPATIENT)
Dept: CARDIOLOGY | Facility: CLINIC | Age: 67
End: 2022-06-13

## 2022-06-13 VITALS
DIASTOLIC BLOOD PRESSURE: 61 MMHG | SYSTOLIC BLOOD PRESSURE: 124 MMHG | OXYGEN SATURATION: 91 % | RESPIRATION RATE: 18 BRPM | WEIGHT: 193.56 LBS | BODY MASS INDEX: 30.38 KG/M2 | HEIGHT: 67 IN | HEART RATE: 95 BPM

## 2022-06-13 DIAGNOSIS — Z01.818 PREOP TESTING: ICD-10-CM

## 2022-06-13 DIAGNOSIS — I74.09 AORTOILIAC OCCLUSIVE DISEASE: Primary | ICD-10-CM

## 2022-06-13 DIAGNOSIS — I74.09 AORTOILIAC OCCLUSIVE DISEASE: ICD-10-CM

## 2022-06-13 DIAGNOSIS — Z51.81 ENCOUNTER FOR MONITORING ANTIPLATELET THERAPY: ICD-10-CM

## 2022-06-13 DIAGNOSIS — Z79.02 ENCOUNTER FOR MONITORING ANTIPLATELET THERAPY: ICD-10-CM

## 2022-06-13 LAB
ANION GAP SERPL CALCULATED.3IONS-SCNC: 10 MMOL/L (ref 5–15)
APTT PPP: 27.7 SECONDS (ref 24.1–35)
BASOPHILS # BLD AUTO: 0.07 10*3/MM3 (ref 0–0.2)
BASOPHILS NFR BLD AUTO: 0.9 % (ref 0–1.5)
BUN SERPL-MCNC: 46 MG/DL (ref 8–23)
BUN/CREAT SERPL: 18.5 (ref 7–25)
CALCIUM SPEC-SCNC: 9.3 MG/DL (ref 8.6–10.5)
CHLORIDE SERPL-SCNC: 104 MMOL/L (ref 98–107)
CO2 SERPL-SCNC: 24 MMOL/L (ref 22–29)
CREAT SERPL-MCNC: 2.48 MG/DL (ref 0.76–1.27)
DEPRECATED RDW RBC AUTO: 42.1 FL (ref 37–54)
EGFRCR SERPLBLD CKD-EPI 2021: 28.1 ML/MIN/1.73
EOSINOPHIL # BLD AUTO: 0.24 10*3/MM3 (ref 0–0.4)
EOSINOPHIL NFR BLD AUTO: 2.9 % (ref 0.3–6.2)
ERYTHROCYTE [DISTWIDTH] IN BLOOD BY AUTOMATED COUNT: 13.3 % (ref 12.3–15.4)
GLUCOSE SERPL-MCNC: 145 MG/DL (ref 65–99)
HCT VFR BLD AUTO: 40.1 % (ref 37.5–51)
HGB BLD-MCNC: 13.4 G/DL (ref 13–17.7)
IMM GRANULOCYTES # BLD AUTO: 0.05 10*3/MM3 (ref 0–0.05)
IMM GRANULOCYTES NFR BLD AUTO: 0.6 % (ref 0–0.5)
INR PPP: 1.04 (ref 0.91–1.09)
LYMPHOCYTES # BLD AUTO: 1.98 10*3/MM3 (ref 0.7–3.1)
LYMPHOCYTES NFR BLD AUTO: 24.3 % (ref 19.6–45.3)
MCH RBC QN AUTO: 29.1 PG (ref 26.6–33)
MCHC RBC AUTO-ENTMCNC: 33.4 G/DL (ref 31.5–35.7)
MCV RBC AUTO: 87 FL (ref 79–97)
MONOCYTES # BLD AUTO: 0.91 10*3/MM3 (ref 0.1–0.9)
MONOCYTES NFR BLD AUTO: 11.2 % (ref 5–12)
NEUTROPHILS NFR BLD AUTO: 4.89 10*3/MM3 (ref 1.7–7)
NEUTROPHILS NFR BLD AUTO: 60.1 % (ref 42.7–76)
NRBC BLD AUTO-RTO: 0 /100 WBC (ref 0–0.2)
PLATELET # BLD AUTO: 277 10*3/MM3 (ref 140–450)
PMV BLD AUTO: 10.1 FL (ref 6–12)
POTASSIUM SERPL-SCNC: 4.2 MMOL/L (ref 3.5–5.2)
PROTHROMBIN TIME: 13.2 SECONDS (ref 11.9–14.6)
RBC # BLD AUTO: 4.61 10*6/MM3 (ref 4.14–5.8)
SARS-COV-2 ORF1AB RESP QL NAA+PROBE: NOT DETECTED
SODIUM SERPL-SCNC: 138 MMOL/L (ref 136–145)
WBC NRBC COR # BLD: 8.14 10*3/MM3 (ref 3.4–10.8)

## 2022-06-13 PROCEDURE — U0004 COV-19 TEST NON-CDC HGH THRU: HCPCS

## 2022-06-13 PROCEDURE — 93010 ELECTROCARDIOGRAM REPORT: CPT | Performed by: INTERNAL MEDICINE

## 2022-06-13 PROCEDURE — 85730 THROMBOPLASTIN TIME PARTIAL: CPT

## 2022-06-13 PROCEDURE — 85610 PROTHROMBIN TIME: CPT

## 2022-06-13 PROCEDURE — 80061 LIPID PANEL: CPT | Performed by: INTERNAL MEDICINE

## 2022-06-13 PROCEDURE — 85025 COMPLETE CBC W/AUTO DIFF WBC: CPT

## 2022-06-13 PROCEDURE — C9803 HOPD COVID-19 SPEC COLLECT: HCPCS

## 2022-06-13 PROCEDURE — 71046 X-RAY EXAM CHEST 2 VIEWS: CPT

## 2022-06-13 PROCEDURE — 93005 ELECTROCARDIOGRAM TRACING: CPT

## 2022-06-13 PROCEDURE — U0005 INFEC AGEN DETEC AMPLI PROBE: HCPCS

## 2022-06-13 PROCEDURE — 80048 BASIC METABOLIC PNL TOTAL CA: CPT

## 2022-06-13 PROCEDURE — 36415 COLL VENOUS BLD VENIPUNCTURE: CPT

## 2022-06-13 NOTE — TELEPHONE ENCOUNTER
ATTEMPTED TO REACH PT TO INFORM OF SURGERY CANCELLATION AND THAT WE SENT A REFERRAL FOR CARDIOLOGY. NO VM IS SET UP.

## 2022-06-13 NOTE — TELEPHONE ENCOUNTER
DELETE AFTER REVIEWING: Telephone encounter to be sent to the clinical pool     Caller: Campbell Casas    Relationship to patient: Self    Best call back number:[phone]    Type of visit: NEW PT CARDIAC CLEARANCE            Additional notes:  [facility] RECEIVED REFERRAL FOR PT TO HAVE CARDIAC CLEARANCE, PT REFERRED TO DR. HUERTA. [facility] CONTACTED PT HOW EVER THE PT'S SURGERY IS THIS WED 6.15.22, [facility] CALLED OFFICE WAS WARM TRANSFERRED 2 TIMES TO FERNIE SCOTT, UNABLE TO REACH NURSE. HUB LEFT . PLEASE CALL CAMPBELL ASAP REGARDING HIS CARDIAC CLEARANCE.

## 2022-06-13 NOTE — TELEPHONE ENCOUNTER
Xi with Surgery Pre-Work called from EXT [id number] in regards to Mr Augusto stating his EKG he had done showed Acute MI and Dr Carrera as well as Dr Laguerre and Dr Braxton reviewed and recommended to have a cardiology consult before surgery on Wednesday.     I then relayed message to Reyna RUBIN who put in Cardiology consult for MR Casas.

## 2022-06-14 ENCOUNTER — TELEPHONE (OUTPATIENT)
Dept: VASCULAR SURGERY | Facility: CLINIC | Age: 67
End: 2022-06-14

## 2022-06-14 ENCOUNTER — OFFICE VISIT (OUTPATIENT)
Dept: CARDIOLOGY | Facility: CLINIC | Age: 67
End: 2022-06-14

## 2022-06-14 VITALS
HEART RATE: 100 BPM | BODY MASS INDEX: 30.29 KG/M2 | SYSTOLIC BLOOD PRESSURE: 138 MMHG | OXYGEN SATURATION: 95 % | HEIGHT: 67 IN | DIASTOLIC BLOOD PRESSURE: 70 MMHG | WEIGHT: 193 LBS

## 2022-06-14 DIAGNOSIS — I10 ESSENTIAL HYPERTENSION: ICD-10-CM

## 2022-06-14 DIAGNOSIS — Z72.0 TOBACCO ABUSE: ICD-10-CM

## 2022-06-14 DIAGNOSIS — E78.5 DYSLIPIDEMIA: ICD-10-CM

## 2022-06-14 DIAGNOSIS — I73.9 PAD (PERIPHERAL ARTERY DISEASE): ICD-10-CM

## 2022-06-14 DIAGNOSIS — I25.5 ISCHEMIC CARDIOMYOPATHY: ICD-10-CM

## 2022-06-14 DIAGNOSIS — R06.09 DYSPNEA ON EXERTION: ICD-10-CM

## 2022-06-14 DIAGNOSIS — Z01.818 PREOPERATIVE EVALUATION TO RULE OUT SURGICAL CONTRAINDICATION: ICD-10-CM

## 2022-06-14 DIAGNOSIS — I25.118 CORONARY ARTERY DISEASE OF NATIVE ARTERY OF NATIVE HEART WITH STABLE ANGINA PECTORIS: Primary | ICD-10-CM

## 2022-06-14 DIAGNOSIS — N18.4 CKD (CHRONIC KIDNEY DISEASE) STAGE 4, GFR 15-29 ML/MIN: ICD-10-CM

## 2022-06-14 LAB
CHOLEST SERPL-MCNC: 162 MG/DL (ref 0–200)
HDLC SERPL-MCNC: 28 MG/DL (ref 40–60)
LDLC SERPL CALC-MCNC: 93 MG/DL (ref 0–100)
LDLC/HDLC SERPL: 3.07 {RATIO}
TRIGL SERPL-MCNC: 240 MG/DL (ref 0–150)
VLDLC SERPL-MCNC: 41 MG/DL (ref 5–40)

## 2022-06-14 PROCEDURE — 93000 ELECTROCARDIOGRAM COMPLETE: CPT | Performed by: INTERNAL MEDICINE

## 2022-06-14 PROCEDURE — 99204 OFFICE O/P NEW MOD 45 MIN: CPT | Performed by: INTERNAL MEDICINE

## 2022-06-14 NOTE — TELEPHONE ENCOUNTER
Spoke with Daphne at Yaa RUBIN office at [facility] [phone] letting her know that Mr Augusto had pre-work labs done and his Creatine is still showing 2.48. Daphne stated she would let Yaa RUBIN know as they done labs on June 8th, 2022 at it was 1.8.

## 2022-06-14 NOTE — TELEPHONE ENCOUNTER
Pt is A&O, pleasant with cares. Pt was admitted to floor around 1800. VSS on
RA, BP was elavated initially, but has been titrating down. Site looks good,
soft nontender. No oozing seen. 2cc were removed close to 1700 due to pt
reporting a numb feeling in hand. Circulation looks good and pulse ox on
finder on right hand and reading well. IV fluids started per orders, NS
@100ml/hr. Pt will be having CTA, good working IV in left AC. I called
cariology to clarify if heparin gtt needed to be continued, Dr. Jensen
stated to d/c heparin. Plan is for repeat ECHO in AM. PO lasix and ibuprofen
given. Pt does not report any chest pain at this time. Wife at bedside. This pt has an appt to see Dr Mcdonough today 6/14 @ 10:30 am for cardiac clearance

## 2022-06-14 NOTE — PROGRESS NOTES
Reason for Visit: cardiovascular follow up.    HPI:  Campbell Casas is a 67 y.o. male is being seen for consultation today at the request of CARYN Boyd for preoperative risk stratification prior to vascular surgery for aortoiliac occlusive disease.  He was found to have significant peripheral arterial disease on DK and CT of the abdomen in May.  He was seen by Dr. Vaz with plans for a left common femoral endarterectomy with bilateral iliac stenting.  He reports having shortness of breath but denies any chest pain.  He can't do much activity because of the claudication symptoms.  He is interested in quitting smoking and wants to try nicotine replacement therapy over-the-counter.    Previous Cardiac Testing and Procedures:  -Echo (8/17/2014) EF 40-45%, abnormal diastolic function, normal RV size and function, normal atria, mild TR and MR  -DSE (8/18/2014) possible old basal MI with akiko-infarct ischemia  -LHC (8/19/2014) 50% mid LAD,  of mid RCA with filling via left to right collaterals, EF 40%  -proBNP (7/7/2019) 793, normal 0-900  -DK (5/18/2022) moderate arterial insufficiency of the right lower extremity at rest, severe arterial insufficiency of the left lower extremity at rest  -CTA of the abdomen (5/31/2022) severe atheromatous changes of the abdominal aorta, partial luminal intramural thrombosis of the distal abdominal aorta with less than 50% stenosis, long segment of noncalcified atheromatous plaque along the proximal to mid right SFA with up to 70% stenosis significant high-grade stenosis of the proximal right SFA  -BMP (6/13/2022) creatinine 2.48, GFR 28, BUN 46, potassium 4.2, sodium 138    Patient Active Problem List   Diagnosis   • Tobacco abuse   • Essential hypertension   • Other hyperlipidemia   • PAD (peripheral artery disease) ([facility])   • Aortoiliac occlusive disease ([facility])   • Preop testing   • CKD (chronic kidney disease) stage 4, GFR 15-29 ml/min ([facility])   • Coronary artery  disease of native artery of native heart with stable angina pectoris (HCC)   • Ischemic cardiomyopathy       Social History     Tobacco Use   • Smoking status: Current Every Day Smoker     Packs/day: 1.25     Start date: 1975   • Smokeless tobacco: Never Used   Vaping Use   • Vaping Use: Never used   Substance Use Topics   • Alcohol use: Not Currently   • Drug use: Never       History reviewed. No pertinent family history.    The following portions of the patient's history were reviewed and updated as appropriate: allergies, current medications, past family history, past medical history, past social history, past surgical history and problem list.      Current Outpatient Medications:   •  albuterol sulfate  (90 Base) MCG/ACT inhaler, Inhale 2 puffs Every 4 (Four) Hours As Needed for Wheezing or Shortness of Air., Disp: 1 inhaler, Rfl: 0  •  amLODIPine (NORVASC) 5 MG tablet, Take 5 mg by mouth every night at bedtime., Disp: , Rfl:   •  aspirin 81 MG EC tablet, Take 1 tablet by mouth Daily., Disp: , Rfl:   •  atorvastatin (LIPITOR) 80 MG tablet, Take 80 mg by mouth every night at bedtime., Disp: , Rfl:   •  diazePAM (VALIUM) 10 MG tablet, Take 10 mg by mouth 3 (Three) Times a Day As Needed., Disp: , Rfl:   •  FLUoxetine (PROzac) 10 MG capsule, Take 10 mg by mouth Daily., Disp: , Rfl:   •  HYDROcodone-acetaminophen (NORCO) 7.5-325 MG per tablet, Take 1 tablet by mouth Every 4 (Four) Hours As Needed for Moderate Pain ., Disp: 10 tablet, Rfl: 0  •  lisinopril (PRINIVIL,ZESTRIL) 20 MG tablet, Take 1 tablet by mouth Daily., Disp: 30 tablet, Rfl: 0  •  metoprolol succinate XL (TOPROL-XL) 100 MG 24 hr tablet, Take 100 mg by mouth every night at bedtime., Disp: , Rfl:   •  tamsulosin (FLOMAX) 0.4 MG capsule 24 hr capsule, Take 1 capsule by mouth every night at bedtime., Disp: , Rfl:     Review of Systems   Constitutional: Negative for chills and fever.   Cardiovascular: Positive for claudication and dyspnea on  "exertion. Negative for chest pain and paroxysmal nocturnal dyspnea.   Respiratory: Positive for shortness of breath. Negative for cough.    Skin: Negative for rash.   Musculoskeletal: Positive for muscle cramps.   Gastrointestinal: Negative for abdominal pain and heartburn.   Neurological: Negative for dizziness and numbness.       Objective   /70 (BP Location: Left arm, Patient Position: Sitting, Cuff Size: Adult)   Pulse 100   Ht 170.2 cm (67\")   Wt 87.5 kg (193 lb)   SpO2 95%   BMI 30.23 kg/m²   Constitutional:       Appearance: Well-developed. Obese.   HENT:      Head: Normocephalic and atraumatic.   Pulmonary:      Effort: Pulmonary effort is normal.      Breath sounds: Normal breath sounds.   Cardiovascular:      Normal rate. Regular rhythm.      Murmurs: There is no murmur.      No gallop. No click.   Skin:     General: Skin is warm and dry.   Neurological:      Mental Status: Alert and oriented to person, place, and time.         ECG 12 Lead    Date/Time: 6/14/2022 11:07 AM  Performed by: Noe Mcdonough MD  Authorized by: Noe Mcdonough MD   Comparison: compared with previous ECG from 6/13/2022  Similar to previous ECG  Rhythm: sinus rhythm  Rate: normal  Q waves: II, III and aVF    T inversion: V6, V5, V4, II, III and aVF                ICD-10-CM ICD-9-CM   1. Coronary artery disease of native artery of native heart with stable angina pectoris ([facility])  I25.118 414.01     413.9   2. Ischemic cardiomyopathy  I25.5 414.8   3. Dyspnea on exertion  R06.00 786.09   4. PAD (peripheral artery disease) ([facility])  I73.9 443.9   5. Essential hypertension  I10 401.9   6. Dyslipidemia  E78.5 272.4   7. Tobacco abuse  Z72.0 305.1   8. CKD (chronic kidney disease) stage 4, GFR 15-29 ml/min ([facility])  N18.4 585.4   9. Preoperative evaluation to rule out surgical contraindication  Z01.818 V72.83         Assessment/Plan:  1.  Coronary artery disease: LHC from 8/2014 demonstrated 50% mid LAD with  of RCA.  He does " have dyspnea on exertion which may be an anginal equivalent.  Evaluate further with a nuclear stress.  Continue aspirin, atorvastatin, and metoprolol.    2.  Ischemic cardiomyopathy: EF was 40% on LHC from 8/19/2014 and 40 to 45% on echo from 8/17/2014.  Check an echocardiogram given his dyspnea on exertion.  Continue metoprolol succinate and lisinopril.    3.  Peripheral arterial disease: Severe disease noted on DK and CTA from 5/2022.  Follows with Dr. Vaz and planning on lower extremity revascularization in the near future.  Continue aspirin and atorvastatin.    4.  Essential hypertension: Blood pressure is borderline today but he reports good control at home.  Continue amlodipine, metoprolol, and lisinopril.    5.  Dyslipidemia: Continue atorvastatin and add a lipid panel onto his blood work he had done yesterday.    6.  Tobacco abuse: Campbell Casas  reports that he has been smoking. He started smoking about 47 years ago. He has been smoking about 1.25 packs per day. He has never used smokeless tobacco.. I have educated him on the risk of diseases from using tobacco products such as cancer, COPD and heart disease.  I advised him to quit and he is willing to quit. We have discussed the following method/s for tobacco cessation:  OTC Cessation Products.  Together we have set a quit date for the near future.  He will follow up with me in 2 months or sooner to check on his progress.  I spent 4 minutes counseling the patient.    7.  CKD stage IV: Refer to nephrology.    8.  Preoperative risk stratification: Patient is an increased risk surgical candidate from a cardiac standpoint given his poor functional capacity, cardiac history, and significant vascular disease.  Evaluate further with a nuclear stress test and echocardiogram prior to surgery.  He is currently on good medical therapy and should be continued at this time.

## 2022-06-16 LAB
QT INTERVAL: 384 MS
QTC INTERVAL: 464 MS

## 2022-06-21 ENCOUNTER — HOSPITAL ENCOUNTER (OUTPATIENT)
Dept: CARDIOLOGY | Facility: HOSPITAL | Age: 67
Discharge: HOME | End: 2022-06-21

## 2022-06-21 ENCOUNTER — APPOINTMENT (OUTPATIENT)
Dept: CARDIOLOGY | Facility: HOSPITAL | Age: 67
End: 2022-06-21

## 2022-06-21 ENCOUNTER — HOSPITAL ENCOUNTER (OUTPATIENT)
Dept: CARDIOLOGY | Facility: HOSPITAL | Age: 67
Discharge: HOME | End: 2022-06-21
Admitting: INTERNAL MEDICINE

## 2022-06-21 VITALS
WEIGHT: 193.56 LBS | SYSTOLIC BLOOD PRESSURE: 119 MMHG | BODY MASS INDEX: 30.38 KG/M2 | HEIGHT: 67 IN | DIASTOLIC BLOOD PRESSURE: 59 MMHG | HEART RATE: 67 BPM

## 2022-06-21 LAB
BH CV ECHO MEAS - AO MAX PG: 6.6 MMHG
BH CV ECHO MEAS - AO MEAN PG: 3 MMHG
BH CV ECHO MEAS - AO ROOT DIAM: 2.5 CM
BH CV ECHO MEAS - AO V2 MAX: 128 CM/SEC
BH CV ECHO MEAS - AO V2 VTI: 22.6 CM
BH CV ECHO MEAS - AVA(I,D): 2.5 CM2
BH CV ECHO MEAS - EDV(CUBED): 98.6 ML
BH CV ECHO MEAS - EDV(MOD-SP2): 121 ML
BH CV ECHO MEAS - EDV(MOD-SP4): 106 ML
BH CV ECHO MEAS - EF(MOD-BP): 51.5 %
BH CV ECHO MEAS - EF(MOD-SP2): 48.1 %
BH CV ECHO MEAS - EF(MOD-SP4): 56.2 %
BH CV ECHO MEAS - ESV(CUBED): 34 ML
BH CV ECHO MEAS - ESV(MOD-SP2): 62.8 ML
BH CV ECHO MEAS - ESV(MOD-SP4): 46.4 ML
BH CV ECHO MEAS - FS: 29.9 %
BH CV ECHO MEAS - IVS/LVPW: 0.97 CM
BH CV ECHO MEAS - IVSD: 1.7 CM
BH CV ECHO MEAS - LA DIMENSION: 3.6 CM
BH CV ECHO MEAS - LAT PEAK E' VEL: 3.7 CM/SEC
BH CV ECHO MEAS - LV DIASTOLIC VOL/BSA (35-75): 53.2 CM2
BH CV ECHO MEAS - LV MASS(C)D: 355.8 GRAMS
BH CV ECHO MEAS - LV MAX PG: 3.1 MMHG
BH CV ECHO MEAS - LV MEAN PG: 1 MMHG
BH CV ECHO MEAS - LV SYSTOLIC VOL/BSA (12-30): 23.3 CM2
BH CV ECHO MEAS - LV V1 MAX: 88.7 CM/SEC
BH CV ECHO MEAS - LV V1 VTI: 16.5 CM
BH CV ECHO MEAS - LVIDD: 4.6 CM
BH CV ECHO MEAS - LVIDS: 3.2 CM
BH CV ECHO MEAS - LVOT AREA: 3.5 CM2
BH CV ECHO MEAS - LVOT DIAM: 2.1 CM
BH CV ECHO MEAS - LVPWD: 1.75 CM
BH CV ECHO MEAS - MED PEAK E' VEL: 3.4 CM/SEC
BH CV ECHO MEAS - MR MAX PG: 12.5 MMHG
BH CV ECHO MEAS - MR MAX VEL: 177 CM/SEC
BH CV ECHO MEAS - MV A MAX VEL: 67.9 CM/SEC
BH CV ECHO MEAS - MV DEC SLOPE: 250.5 CM/SEC2
BH CV ECHO MEAS - MV DEC TIME: 0.23 MSEC
BH CV ECHO MEAS - MV E MAX VEL: 45.1 CM/SEC
BH CV ECHO MEAS - MV E/A: 0.66
BH CV ECHO MEAS - MV MAX PG: 3.2 MMHG
BH CV ECHO MEAS - MV MEAN PG: 2 MMHG
BH CV ECHO MEAS - MV P1/2T: 88.9 MSEC
BH CV ECHO MEAS - MV V2 VTI: 23 CM
BH CV ECHO MEAS - MVA(P1/2T): 2.48 CM2
BH CV ECHO MEAS - MVA(VTI): 2.48 CM2
BH CV ECHO MEAS - RVDD: 3 CM
BH CV ECHO MEAS - SI(MOD-SP2): 29.2 ML/M2
BH CV ECHO MEAS - SI(MOD-SP4): 29.9 ML/M2
BH CV ECHO MEAS - SV(LVOT): 57.1 ML
BH CV ECHO MEAS - SV(MOD-SP2): 58.2 ML
BH CV ECHO MEAS - SV(MOD-SP4): 59.6 ML
BH CV ECHO MEAS - TAPSE (>1.6): 1.65 CM
BH CV ECHO MEASUREMENTS AVERAGE E/E' RATIO: 12.7
BH CV NUCLEAR PRIOR STUDY: 3
BH CV REST NUCLEAR ISOTOPE DOSE: 10.6 MCI
BH CV STRESS BP STAGE 1: NORMAL
BH CV STRESS COMMENTS STAGE 1: NORMAL
BH CV STRESS DOSE REGADENOSON STAGE 1: 0.4
BH CV STRESS DURATION MIN STAGE 1: 0
BH CV STRESS DURATION SEC STAGE 1: 10
BH CV STRESS HR STAGE 1: 76
BH CV STRESS NUCLEAR ISOTOPE DOSE: 33.6 MCI
BH CV STRESS PROTOCOL 1: NORMAL
BH CV STRESS RECOVERY BP: NORMAL MMHG
BH CV STRESS RECOVERY HR: 76 BPM
BH CV STRESS STAGE 1: 1
BH CV XLRA - TDI S': 12.7 CM/SEC
LEFT ATRIUM VOLUME INDEX: 20.7 ML/M2
LEFT ATRIUM VOLUME: 41.2 ML
LV EF NUC BP: 60 %
MAXIMAL PREDICTED HEART RATE: 155 BPM
MAXIMAL PREDICTED HEART RATE: 155 BPM
PERCENT MAX PREDICTED HR: 49.03 %
STRESS BASELINE BP: NORMAL MMHG
STRESS BASELINE HR: 67 BPM
STRESS PERCENT HR: 58 %
STRESS POST EXERCISE DUR MIN: 0 MIN
STRESS POST EXERCISE DUR SEC: 10 SEC
STRESS POST PEAK BP: NORMAL MMHG
STRESS POST PEAK HR: 76 BPM
STRESS TARGET HR: 132 BPM
STRESS TARGET HR: 132 BPM

## 2022-06-21 PROCEDURE — 78452 HT MUSCLE IMAGE SPECT MULT: CPT | Performed by: INTERNAL MEDICINE

## 2022-06-21 PROCEDURE — 78452 HT MUSCLE IMAGE SPECT MULT: CPT

## 2022-06-21 PROCEDURE — 93306 TTE W/DOPPLER COMPLETE: CPT | Performed by: INTERNAL MEDICINE

## 2022-06-21 PROCEDURE — 93017 CV STRESS TEST TRACING ONLY: CPT

## 2022-06-21 PROCEDURE — 93306 TTE W/DOPPLER COMPLETE: CPT

## 2022-06-21 PROCEDURE — 0 TECHNETIUM SESTAMIBI: Performed by: INTERNAL MEDICINE

## 2022-06-21 PROCEDURE — 25010000002 REGADENOSON 0.4 MG/5ML SOLUTION: Performed by: INTERNAL MEDICINE

## 2022-06-21 PROCEDURE — 93018 CV STRESS TEST I&R ONLY: CPT | Performed by: INTERNAL MEDICINE

## 2022-06-21 PROCEDURE — A9500 TC99M SESTAMIBI: HCPCS | Performed by: INTERNAL MEDICINE

## 2022-06-21 RX ADMIN — TECHNETIUM TC 99M SESTAMIBI 1 DOSE: 1 INJECTION INTRAVENOUS at 07:30

## 2022-06-21 RX ADMIN — REGADENOSON 0.4 MG: 0.08 INJECTION, SOLUTION INTRAVENOUS at 08:58

## 2022-06-21 RX ADMIN — TECHNETIUM TC 99M SESTAMIBI 1 DOSE: 1 INJECTION INTRAVENOUS at 09:04

## 2022-06-24 ENCOUNTER — TELEPHONE (OUTPATIENT)
Dept: CARDIOLOGY | Facility: CLINIC | Age: 67
End: 2022-06-24

## 2022-06-24 NOTE — TELEPHONE ENCOUNTER
----- Message from Noe Mcdonough MD sent at 6/21/2022  6:50 PM CDT -----  Please let him know that the stress test shows evidence where he had his prior heart attack but does not show any new blockages (negative for ischemia ischemia).  It is a low risk study.

## 2022-06-24 NOTE — TELEPHONE ENCOUNTER
----- Message from Noe Mcdonough MD sent at 6/21/2022  6:51 PM CDT -----  Please also let him know that the echo shows normal squeeze function of his heart and appears improved compared to his previous echo in 2014.  There is some thickening and stiffening of his heart that is likely secondary to age and high blood pressure.

## 2022-06-27 ENCOUNTER — TELEPHONE (OUTPATIENT)
Dept: CARDIOLOGY | Facility: CLINIC | Age: 67
End: 2022-06-27

## 2022-06-27 NOTE — TELEPHONE ENCOUNTER
----- Message from CARYN Gilbert sent at 6/27/2022 12:59 PM CDT -----    Patient had his nuclear stress test that Dr Mcdonough ordered for cardiac risk stratification on 6/21/2022 and it was low risk. He also had his echo done on 6/21/2022 that normal squeeze function of his heart and appears improved compared to his previous echo in 2014.     Based on review of chart...  Overall patient is a low to intermediate surgical risk from a cardiac standpoint.  He had a previous low risk nuclear stress test in 6/2022 and echo done 6/21/2022 normal ejection fraction and appears improved compared to his previous echo in 2014.  He is on good medical therapy which he should continue. No further cardiac testing is indicated prior to surgery. He should remain on aspirin through perioperative period.       ----- Message -----  From: Zeenat Fang MA  Sent: 6/27/2022  12:48 PM CDT  To: CAYRN Gilbert, Zeenat Fang MA    Please advise for Dr Mcdonough.    ----- Message -----  From: Trung Ledesma RegSched Rep  Sent: 6/27/2022  12:43 PM CDT  To: Zeenat Fang MA    Good afternoon,    I was wanting to see if you could get Dr. Mcdonough to place a letter giving clearance for this patient to have surgery with Dr. Vaz.    Thanks!

## 2022-06-29 ENCOUNTER — TELEPHONE (OUTPATIENT)
Dept: VASCULAR SURGERY | Facility: CLINIC | Age: 67
End: 2022-06-29

## 2022-06-29 NOTE — TELEPHONE ENCOUNTER
SPOKE WITH PATIENT REGARDING SURGERY. PT WAS INFORMED OF PRE WORK NOT NEEDED . PT WAS ALSO INFORMED OF SURGERY ON 07/11 AT 0700. PT WAS INFORMED OF COVID TEST AND VISITATION. PT STATED UNDERSTANDING OF INSTRUCTIONS AND ALL INFORMATION.

## 2022-06-30 DIAGNOSIS — R79.89 ELEVATED SERUM CREATININE: Primary | ICD-10-CM

## 2022-07-07 ENCOUNTER — LAB (OUTPATIENT)
Dept: LAB | Facility: HOSPITAL | Age: 67
End: 2022-07-07

## 2022-07-07 DIAGNOSIS — Z01.818 PREOP TESTING: ICD-10-CM

## 2022-07-07 DIAGNOSIS — Z01.818 PREOP TESTING: Primary | ICD-10-CM

## 2022-07-07 LAB — SARS-COV-2 ORF1AB RESP QL NAA+PROBE: NOT DETECTED

## 2022-07-07 PROCEDURE — U0004 COV-19 TEST NON-CDC HGH THRU: HCPCS

## 2022-07-07 PROCEDURE — U0005 INFEC AGEN DETEC AMPLI PROBE: HCPCS

## 2022-07-07 PROCEDURE — C9803 HOPD COVID-19 SPEC COLLECT: HCPCS

## 2022-07-08 ENCOUNTER — TELEPHONE (OUTPATIENT)
Dept: VASCULAR SURGERY | Facility: CLINIC | Age: 67
End: 2022-07-08

## 2022-07-08 NOTE — TELEPHONE ENCOUNTER
Rebecca called and stated that Dr. Vaz asked for the patient to be moved to Wednesday.  I called down and spoke to Darlene.  I asked her to move this to Wednesday and put him as the first patient for this day.  We got him changed.  I tried to contact the patient on his phone and his emergency contact's phone and was unable to reach anybody and neither had VM set up.      **Pt called back and I gave him the new date and time.  He confirmed.

## 2022-07-12 ENCOUNTER — TELEPHONE (OUTPATIENT)
Dept: VASCULAR SURGERY | Facility: CLINIC | Age: 67
End: 2022-07-12

## 2022-07-13 ENCOUNTER — HOSPITAL ENCOUNTER (INPATIENT)
Facility: HOSPITAL | Age: 67
LOS: 1 days | Discharge: HOME | End: 2022-07-14
Attending: SURGERY | Admitting: SURGERY

## 2022-07-13 ENCOUNTER — ANESTHESIA EVENT (OUTPATIENT)
Dept: PERIOP | Facility: HOSPITAL | Age: 67
End: 2022-07-13

## 2022-07-13 ENCOUNTER — ANESTHESIA (OUTPATIENT)
Dept: PERIOP | Facility: HOSPITAL | Age: 67
End: 2022-07-13

## 2022-07-13 ENCOUNTER — APPOINTMENT (OUTPATIENT)
Dept: INTERVENTIONAL RADIOLOGY/VASCULAR | Facility: HOSPITAL | Age: 67
End: 2022-07-13

## 2022-07-13 DIAGNOSIS — G89.18 ACUTE POST-OPERATIVE PAIN: ICD-10-CM

## 2022-07-13 DIAGNOSIS — I74.09 AORTOILIAC OCCLUSIVE DISEASE: Primary | ICD-10-CM

## 2022-07-13 DIAGNOSIS — Z01.818 PREOP TESTING: ICD-10-CM

## 2022-07-13 LAB
ABO GROUP BLD: NORMAL
BLD GP AB SCN SERPL QL: NEGATIVE
RH BLD: POSITIVE
SARS-COV-2 RNA PNL SPEC NAA+PROBE: NOT DETECTED
T&S EXPIRATION DATE: NORMAL

## 2022-07-13 PROCEDURE — 04FK3ZZ FRAGMENTATION OF RIGHT FEMORAL ARTERY, PERCUTANEOUS APPROACH: ICD-10-PCS | Performed by: SURGERY

## 2022-07-13 PROCEDURE — C1894 INTRO/SHEATH, NON-LASER: HCPCS | Performed by: SURGERY

## 2022-07-13 PROCEDURE — 76000 FLUOROSCOPY <1 HR PHYS/QHP: CPT

## 2022-07-13 PROCEDURE — 25010000002 PHENYLEPHRINE 10 MG/ML SOLUTION 5 ML VIAL: Performed by: NURSE ANESTHETIST, CERTIFIED REGISTERED

## 2022-07-13 PROCEDURE — C1769 GUIDE WIRE: HCPCS | Performed by: SURGERY

## 2022-07-13 PROCEDURE — 25010000002 ALBUMIN HUMAN 5% PER 50 ML: Performed by: SURGERY

## 2022-07-13 PROCEDURE — P9041 ALBUMIN (HUMAN),5%, 50ML: HCPCS | Performed by: SURGERY

## 2022-07-13 PROCEDURE — C1725 CATH, TRANSLUMIN NON-LASER: HCPCS | Performed by: SURGERY

## 2022-07-13 PROCEDURE — 25010000002 FENTANYL CITRATE (PF) 100 MCG/2ML SOLUTION: Performed by: NURSE ANESTHETIST, CERTIFIED REGISTERED

## 2022-07-13 PROCEDURE — 35371 RECHANNELING OF ARTERY: CPT | Performed by: SURGERY

## 2022-07-13 PROCEDURE — 25010000002 CEFAZOLIN PER 500 MG: Performed by: SURGERY

## 2022-07-13 PROCEDURE — 04CL3ZZ EXTIRPATION OF MATTER FROM LEFT FEMORAL ARTERY, PERCUTANEOUS APPROACH: ICD-10-PCS | Performed by: SURGERY

## 2022-07-13 PROCEDURE — 04UL3KZ SUPPLEMENT LEFT FEMORAL ARTERY WITH NONAUTOLOGOUS TISSUE SUBSTITUTE, PERCUTANEOUS APPROACH: ICD-10-PCS | Performed by: SURGERY

## 2022-07-13 PROCEDURE — C1887 CATHETER, GUIDING: HCPCS | Performed by: SURGERY

## 2022-07-13 PROCEDURE — 75716 ARTERY X-RAYS ARMS/LEGS: CPT

## 2022-07-13 PROCEDURE — 87635 SARS-COV-2 COVID-19 AMP PRB: CPT | Performed by: SURGERY

## 2022-07-13 PROCEDURE — 25010000002 PROPOFOL 10 MG/ML EMULSION: Performed by: NURSE ANESTHETIST, CERTIFIED REGISTERED

## 2022-07-13 PROCEDURE — 86850 RBC ANTIBODY SCREEN: CPT | Performed by: SURGERY

## 2022-07-13 PROCEDURE — 25010000002 PHENYLEPHRINE 10 MG/ML SOLUTION 1 ML VIAL: Performed by: NURSE ANESTHETIST, CERTIFIED REGISTERED

## 2022-07-13 PROCEDURE — 04FH3ZZ FRAGMENTATION OF RIGHT EXTERNAL ILIAC ARTERY, PERCUTANEOUS APPROACH: ICD-10-PCS | Performed by: SURGERY

## 2022-07-13 PROCEDURE — B41D1ZZ FLUOROSCOPY OF AORTA AND BILATERAL LOWER EXTREMITY ARTERIES USING LOW OSMOLAR CONTRAST: ICD-10-PCS | Performed by: SURGERY

## 2022-07-13 PROCEDURE — 37220 PR REVASCULARIZATION ILIAC ARTERY ANGIOP 1ST VSL: CPT | Performed by: SURGERY

## 2022-07-13 PROCEDURE — 86900 BLOOD TYPING SEROLOGIC ABO: CPT | Performed by: SURGERY

## 2022-07-13 PROCEDURE — 75710 ARTERY X-RAYS ARM/LEG: CPT

## 2022-07-13 PROCEDURE — 75710 ARTERY X-RAYS ARM/LEG: CPT | Performed by: SURGERY

## 2022-07-13 PROCEDURE — C1768 GRAFT, VASCULAR: HCPCS | Performed by: SURGERY

## 2022-07-13 PROCEDURE — 25010000002 DEXAMETHASONE PER 1 MG: Performed by: NURSE ANESTHETIST, CERTIFIED REGISTERED

## 2022-07-13 PROCEDURE — 25010000002 IOPAMIDOL 61 % SOLUTION: Performed by: SURGERY

## 2022-07-13 PROCEDURE — 86901 BLOOD TYPING SEROLOGIC RH(D): CPT | Performed by: SURGERY

## 2022-07-13 PROCEDURE — 37224 PR REVSC OPN/PRG FEM/POP W/ANGIOPLASTY UNI: CPT | Performed by: SURGERY

## 2022-07-13 PROCEDURE — 04FJ3ZZ FRAGMENTATION OF LEFT EXTERNAL ILIAC ARTERY, PERCUTANEOUS APPROACH: ICD-10-PCS | Performed by: SURGERY

## 2022-07-13 PROCEDURE — 88311 DECALCIFY TISSUE: CPT | Performed by: SURGERY

## 2022-07-13 PROCEDURE — 88304 TISSUE EXAM BY PATHOLOGIST: CPT | Performed by: SURGERY

## 2022-07-13 PROCEDURE — 25010000002 HEPARIN (PORCINE) PER 1000 UNITS: Performed by: NURSE ANESTHETIST, CERTIFIED REGISTERED

## 2022-07-13 PROCEDURE — 25010000002 HEPARIN (PORCINE) PER 1000 UNITS: Performed by: SURGERY

## 2022-07-13 PROCEDURE — 75625 CONTRAST EXAM ABDOMINL AORTA: CPT | Performed by: SURGERY

## 2022-07-13 PROCEDURE — 25010000002 ONDANSETRON PER 1 MG: Performed by: NURSE ANESTHETIST, CERTIFIED REGISTERED

## 2022-07-13 DEVICE — LIGACLIP MCA MULTIPLE CLIP APPLIERS, 20 MEDIUM CLIPS
Type: IMPLANTABLE DEVICE | Site: GROIN | Status: FUNCTIONAL
Brand: LIGACLIP

## 2022-07-13 DEVICE — LIGACLIP MCA MULTIPLE CLIP APPLIERS, 20 SMALL CLIPS
Type: IMPLANTABLE DEVICE | Site: GROIN | Status: FUNCTIONAL
Brand: LIGACLIP

## 2022-07-13 DEVICE — HEMOST ABS SURGIFOAM SZ100 8X12 10MM: Type: IMPLANTABLE DEVICE | Site: GROIN | Status: FUNCTIONAL

## 2022-07-13 DEVICE — PTCH VASC XENOSURE BIO 0.8X8CM: Type: IMPLANTABLE DEVICE | Site: INGUINAL | Status: FUNCTIONAL

## 2022-07-13 RX ORDER — CLOPIDOGREL BISULFATE 75 MG/1
75 TABLET ORAL DAILY
Status: DISCONTINUED | OUTPATIENT
Start: 2022-07-14 | End: 2022-07-14 | Stop reason: HOSPADM

## 2022-07-13 RX ORDER — NALOXONE HCL 0.4 MG/ML
0.04 VIAL (ML) INJECTION AS NEEDED
Status: DISCONTINUED | OUTPATIENT
Start: 2022-07-13 | End: 2022-07-13 | Stop reason: HOSPADM

## 2022-07-13 RX ORDER — AMLODIPINE BESYLATE 5 MG/1
5 TABLET ORAL
Status: DISCONTINUED | OUTPATIENT
Start: 2022-07-14 | End: 2022-07-14 | Stop reason: HOSPADM

## 2022-07-13 RX ORDER — OXYCODONE AND ACETAMINOPHEN 10; 325 MG/1; MG/1
1 TABLET ORAL ONCE AS NEEDED
Status: COMPLETED | OUTPATIENT
Start: 2022-07-13 | End: 2022-07-13

## 2022-07-13 RX ORDER — FLUMAZENIL 0.1 MG/ML
0.2 INJECTION INTRAVENOUS AS NEEDED
Status: DISCONTINUED | OUTPATIENT
Start: 2022-07-13 | End: 2022-07-13 | Stop reason: HOSPADM

## 2022-07-13 RX ORDER — HYDROCODONE BITARTRATE AND ACETAMINOPHEN 5; 325 MG/1; MG/1
1 TABLET ORAL EVERY 4 HOURS PRN
Status: DISCONTINUED | OUTPATIENT
Start: 2022-07-13 | End: 2022-07-14 | Stop reason: HOSPADM

## 2022-07-13 RX ORDER — LIDOCAINE HYDROCHLORIDE 20 MG/ML
INJECTION, SOLUTION EPIDURAL; INFILTRATION; INTRACAUDAL; PERINEURAL AS NEEDED
Status: DISCONTINUED | OUTPATIENT
Start: 2022-07-13 | End: 2022-07-13 | Stop reason: SURG

## 2022-07-13 RX ORDER — HEPARIN SODIUM 1000 [USP'U]/ML
INJECTION, SOLUTION INTRAVENOUS; SUBCUTANEOUS AS NEEDED
Status: DISCONTINUED | OUTPATIENT
Start: 2022-07-13 | End: 2022-07-13 | Stop reason: SURG

## 2022-07-13 RX ORDER — SODIUM CHLORIDE 0.9 % (FLUSH) 0.9 %
10 SYRINGE (ML) INJECTION AS NEEDED
Status: DISCONTINUED | OUTPATIENT
Start: 2022-07-13 | End: 2022-07-14 | Stop reason: HOSPADM

## 2022-07-13 RX ORDER — ALBUTEROL SULFATE 2.5 MG/3ML
2.5 SOLUTION RESPIRATORY (INHALATION) EVERY 4 HOURS PRN
Status: DISCONTINUED | OUTPATIENT
Start: 2022-07-13 | End: 2022-07-14 | Stop reason: HOSPADM

## 2022-07-13 RX ORDER — SODIUM CHLORIDE 0.9 % (FLUSH) 0.9 %
3 SYRINGE (ML) INJECTION AS NEEDED
Status: DISCONTINUED | OUTPATIENT
Start: 2022-07-13 | End: 2022-07-13 | Stop reason: HOSPADM

## 2022-07-13 RX ORDER — LIDOCAINE HYDROCHLORIDE 10 MG/ML
0.5 INJECTION, SOLUTION EPIDURAL; INFILTRATION; INTRACAUDAL; PERINEURAL ONCE AS NEEDED
Status: DISCONTINUED | OUTPATIENT
Start: 2022-07-13 | End: 2022-07-13 | Stop reason: HOSPADM

## 2022-07-13 RX ORDER — ACETAMINOPHEN 325 MG/1
650 TABLET ORAL EVERY 4 HOURS PRN
Status: DISCONTINUED | OUTPATIENT
Start: 2022-07-13 | End: 2022-07-14 | Stop reason: HOSPADM

## 2022-07-13 RX ORDER — FLUOXETINE 10 MG/1
10 CAPSULE ORAL DAILY
Status: DISCONTINUED | OUTPATIENT
Start: 2022-07-14 | End: 2022-07-14 | Stop reason: HOSPADM

## 2022-07-13 RX ORDER — SODIUM CHLORIDE 0.9 % (FLUSH) 0.9 %
10 SYRINGE (ML) INJECTION EVERY 12 HOURS SCHEDULED
Status: DISCONTINUED | OUTPATIENT
Start: 2022-07-13 | End: 2022-07-14 | Stop reason: HOSPADM

## 2022-07-13 RX ORDER — SODIUM CHLORIDE 9 MG/ML
50 INJECTION, SOLUTION INTRAVENOUS CONTINUOUS
Status: DISCONTINUED | OUTPATIENT
Start: 2022-07-13 | End: 2022-07-14 | Stop reason: HOSPADM

## 2022-07-13 RX ORDER — ALBUMIN, HUMAN INJ 5% 5 %
250 SOLUTION INTRAVENOUS ONCE
Status: COMPLETED | OUTPATIENT
Start: 2022-07-13 | End: 2022-07-13

## 2022-07-13 RX ORDER — HYDROMORPHONE HYDROCHLORIDE 1 MG/ML
0.5 INJECTION, SOLUTION INTRAMUSCULAR; INTRAVENOUS; SUBCUTANEOUS
Status: DISCONTINUED | OUTPATIENT
Start: 2022-07-13 | End: 2022-07-13 | Stop reason: HOSPADM

## 2022-07-13 RX ORDER — NALOXONE HCL 0.4 MG/ML
0.4 VIAL (ML) INJECTION
Status: DISCONTINUED | OUTPATIENT
Start: 2022-07-13 | End: 2022-07-14 | Stop reason: HOSPADM

## 2022-07-13 RX ORDER — DROPERIDOL 2.5 MG/ML
0.62 INJECTION, SOLUTION INTRAMUSCULAR; INTRAVENOUS ONCE AS NEEDED
Status: DISCONTINUED | OUTPATIENT
Start: 2022-07-13 | End: 2022-07-13 | Stop reason: HOSPADM

## 2022-07-13 RX ORDER — NEOSTIGMINE METHYLSULFATE 5 MG/5 ML
SYRINGE (ML) INTRAVENOUS AS NEEDED
Status: DISCONTINUED | OUTPATIENT
Start: 2022-07-13 | End: 2022-07-13 | Stop reason: SURG

## 2022-07-13 RX ORDER — PROPOFOL 10 MG/ML
VIAL (ML) INTRAVENOUS AS NEEDED
Status: DISCONTINUED | OUTPATIENT
Start: 2022-07-13 | End: 2022-07-13 | Stop reason: SURG

## 2022-07-13 RX ORDER — SODIUM CHLORIDE 0.9 % (FLUSH) 0.9 %
10 SYRINGE (ML) INJECTION AS NEEDED
Status: DISCONTINUED | OUTPATIENT
Start: 2022-07-13 | End: 2022-07-13 | Stop reason: HOSPADM

## 2022-07-13 RX ORDER — ONDANSETRON 2 MG/ML
4 INJECTION INTRAMUSCULAR; INTRAVENOUS EVERY 6 HOURS PRN
Status: DISCONTINUED | OUTPATIENT
Start: 2022-07-13 | End: 2022-07-14 | Stop reason: HOSPADM

## 2022-07-13 RX ORDER — FENTANYL CITRATE 50 UG/ML
25 INJECTION, SOLUTION INTRAMUSCULAR; INTRAVENOUS
Status: DISCONTINUED | OUTPATIENT
Start: 2022-07-13 | End: 2022-07-13 | Stop reason: HOSPADM

## 2022-07-13 RX ORDER — IBUPROFEN 600 MG/1
600 TABLET ORAL ONCE AS NEEDED
Status: DISCONTINUED | OUTPATIENT
Start: 2022-07-13 | End: 2022-07-13 | Stop reason: HOSPADM

## 2022-07-13 RX ORDER — BUPIVACAINE HYDROCHLORIDE 5 MG/ML
INJECTION, SOLUTION EPIDURAL; INTRACAUDAL AS NEEDED
Status: DISCONTINUED | OUTPATIENT
Start: 2022-07-13 | End: 2022-07-13 | Stop reason: HOSPADM

## 2022-07-13 RX ORDER — ASPIRIN 81 MG/1
81 TABLET ORAL DAILY
Status: DISCONTINUED | OUTPATIENT
Start: 2022-07-14 | End: 2022-07-14 | Stop reason: HOSPADM

## 2022-07-13 RX ORDER — ONDANSETRON 4 MG/1
4 TABLET, FILM COATED ORAL EVERY 6 HOURS PRN
Status: DISCONTINUED | OUTPATIENT
Start: 2022-07-13 | End: 2022-07-14 | Stop reason: HOSPADM

## 2022-07-13 RX ORDER — DEXAMETHASONE SODIUM PHOSPHATE 4 MG/ML
INJECTION, SOLUTION INTRA-ARTICULAR; INTRALESIONAL; INTRAMUSCULAR; INTRAVENOUS; SOFT TISSUE AS NEEDED
Status: DISCONTINUED | OUTPATIENT
Start: 2022-07-13 | End: 2022-07-13 | Stop reason: SURG

## 2022-07-13 RX ORDER — METOPROLOL SUCCINATE 100 MG/1
100 TABLET, EXTENDED RELEASE ORAL
Status: DISCONTINUED | OUTPATIENT
Start: 2022-07-14 | End: 2022-07-14 | Stop reason: HOSPADM

## 2022-07-13 RX ORDER — SODIUM CHLORIDE 0.9 % (FLUSH) 0.9 %
3 SYRINGE (ML) INJECTION EVERY 12 HOURS SCHEDULED
Status: DISCONTINUED | OUTPATIENT
Start: 2022-07-13 | End: 2022-07-13 | Stop reason: HOSPADM

## 2022-07-13 RX ORDER — SODIUM CHLORIDE, SODIUM LACTATE, POTASSIUM CHLORIDE, CALCIUM CHLORIDE 600; 310; 30; 20 MG/100ML; MG/100ML; MG/100ML; MG/100ML
100 INJECTION, SOLUTION INTRAVENOUS CONTINUOUS
Status: DISCONTINUED | OUTPATIENT
Start: 2022-07-13 | End: 2022-07-13

## 2022-07-13 RX ORDER — ATORVASTATIN CALCIUM 40 MG/1
80 TABLET, FILM COATED ORAL NIGHTLY
Status: DISCONTINUED | OUTPATIENT
Start: 2022-07-13 | End: 2022-07-14 | Stop reason: HOSPADM

## 2022-07-13 RX ORDER — ACETAMINOPHEN 500 MG
1000 TABLET ORAL ONCE
Status: COMPLETED | OUTPATIENT
Start: 2022-07-13 | End: 2022-07-13

## 2022-07-13 RX ORDER — ONDANSETRON 2 MG/ML
INJECTION INTRAMUSCULAR; INTRAVENOUS AS NEEDED
Status: DISCONTINUED | OUTPATIENT
Start: 2022-07-13 | End: 2022-07-13 | Stop reason: SURG

## 2022-07-13 RX ORDER — BUPIVACAINE HCL/0.9 % NACL/PF 0.1 %
2 PLASTIC BAG, INJECTION (ML) EPIDURAL EVERY 8 HOURS
Status: DISCONTINUED | OUTPATIENT
Start: 2022-07-14 | End: 2022-07-14 | Stop reason: HOSPADM

## 2022-07-13 RX ORDER — SODIUM CHLORIDE 0.9 % (FLUSH) 0.9 %
3-10 SYRINGE (ML) INJECTION AS NEEDED
Status: DISCONTINUED | OUTPATIENT
Start: 2022-07-13 | End: 2022-07-13 | Stop reason: HOSPADM

## 2022-07-13 RX ORDER — SODIUM CHLORIDE, SODIUM LACTATE, POTASSIUM CHLORIDE, CALCIUM CHLORIDE 600; 310; 30; 20 MG/100ML; MG/100ML; MG/100ML; MG/100ML
1000 INJECTION, SOLUTION INTRAVENOUS CONTINUOUS
Status: DISCONTINUED | OUTPATIENT
Start: 2022-07-13 | End: 2022-07-13

## 2022-07-13 RX ORDER — FENTANYL CITRATE 50 UG/ML
INJECTION, SOLUTION INTRAMUSCULAR; INTRAVENOUS AS NEEDED
Status: DISCONTINUED | OUTPATIENT
Start: 2022-07-13 | End: 2022-07-13 | Stop reason: SURG

## 2022-07-13 RX ORDER — DIAZEPAM 10 MG/1
10 TABLET ORAL 3 TIMES DAILY PRN
Status: DISCONTINUED | OUTPATIENT
Start: 2022-07-13 | End: 2022-07-14 | Stop reason: HOSPADM

## 2022-07-13 RX ORDER — ONDANSETRON 2 MG/ML
4 INJECTION INTRAMUSCULAR; INTRAVENOUS
Status: DISCONTINUED | OUTPATIENT
Start: 2022-07-13 | End: 2022-07-13 | Stop reason: HOSPADM

## 2022-07-13 RX ORDER — ROCURONIUM BROMIDE 10 MG/ML
INJECTION, SOLUTION INTRAVENOUS AS NEEDED
Status: DISCONTINUED | OUTPATIENT
Start: 2022-07-13 | End: 2022-07-13 | Stop reason: SURG

## 2022-07-13 RX ORDER — LISINOPRIL 20 MG/1
20 TABLET ORAL DAILY
Status: DISCONTINUED | OUTPATIENT
Start: 2022-07-14 | End: 2022-07-14 | Stop reason: HOSPADM

## 2022-07-13 RX ORDER — EPHEDRINE SULFATE 50 MG/ML
INJECTION, SOLUTION INTRAVENOUS AS NEEDED
Status: DISCONTINUED | OUTPATIENT
Start: 2022-07-13 | End: 2022-07-13 | Stop reason: SURG

## 2022-07-13 RX ORDER — TAMSULOSIN HYDROCHLORIDE 0.4 MG/1
0.4 CAPSULE ORAL DAILY
Status: DISCONTINUED | OUTPATIENT
Start: 2022-07-14 | End: 2022-07-14 | Stop reason: HOSPADM

## 2022-07-13 RX ORDER — LABETALOL HYDROCHLORIDE 5 MG/ML
5 INJECTION, SOLUTION INTRAVENOUS
Status: DISCONTINUED | OUTPATIENT
Start: 2022-07-13 | End: 2022-07-13 | Stop reason: HOSPADM

## 2022-07-13 RX ADMIN — ALBUMIN HUMAN 250 ML: 0.05 INJECTION, SOLUTION INTRAVENOUS at 19:11

## 2022-07-13 RX ADMIN — EPHEDRINE SULFATE 20 MG: 50 INJECTION INTRAVENOUS at 16:50

## 2022-07-13 RX ADMIN — ACETAMINOPHEN 1000 MG: 500 TABLET ORAL at 15:42

## 2022-07-13 RX ADMIN — SODIUM CHLORIDE, POTASSIUM CHLORIDE, SODIUM LACTATE AND CALCIUM CHLORIDE 1000 ML: 600; 310; 30; 20 INJECTION, SOLUTION INTRAVENOUS at 15:18

## 2022-07-13 RX ADMIN — ATORVASTATIN CALCIUM 80 MG: 40 TABLET, FILM COATED ORAL at 22:12

## 2022-07-13 RX ADMIN — Medication 3 MG: at 18:26

## 2022-07-13 RX ADMIN — FENTANYL CITRATE 100 MCG: 50 INJECTION, SOLUTION INTRAMUSCULAR; INTRAVENOUS at 16:37

## 2022-07-13 RX ADMIN — Medication 10 ML: at 22:13

## 2022-07-13 RX ADMIN — ROCURONIUM BROMIDE 30 MG: 10 SOLUTION INTRAVENOUS at 16:37

## 2022-07-13 RX ADMIN — ALBUMIN HUMAN 250 ML: 0.05 INJECTION, SOLUTION INTRAVENOUS at 18:59

## 2022-07-13 RX ADMIN — OXYCODONE AND ACETAMINOPHEN 1 TABLET: 325; 10 TABLET ORAL at 20:03

## 2022-07-13 RX ADMIN — ONDANSETRON 4 MG: 2 INJECTION INTRAMUSCULAR; INTRAVENOUS at 18:18

## 2022-07-13 RX ADMIN — HEPARIN SODIUM 5000 UNITS: 1000 INJECTION, SOLUTION INTRAVENOUS; SUBCUTANEOUS at 17:03

## 2022-07-13 RX ADMIN — SODIUM CHLORIDE 50 ML/HR: 9 INJECTION, SOLUTION INTRAVENOUS at 21:38

## 2022-07-13 RX ADMIN — DEXAMETHASONE SODIUM PHOSPHATE 4 MG: 4 INJECTION, SOLUTION INTRA-ARTICULAR; INTRALESIONAL; INTRAMUSCULAR; INTRAVENOUS; SOFT TISSUE at 18:18

## 2022-07-13 RX ADMIN — SODIUM CHLORIDE, POTASSIUM CHLORIDE, SODIUM LACTATE AND CALCIUM CHLORIDE 1000 ML: 600; 310; 30; 20 INJECTION, SOLUTION INTRAVENOUS at 10:05

## 2022-07-13 RX ADMIN — PROPOFOL 100 MG: 10 INJECTION, EMULSION INTRAVENOUS at 16:37

## 2022-07-13 RX ADMIN — PHENYLEPHRINE HYDROCHLORIDE 0.5 MCG/KG/MIN: 10 INJECTION INTRAVENOUS at 19:06

## 2022-07-13 RX ADMIN — PHENYLEPHRINE HYDROCHLORIDE 1 MCG/KG/MIN: 10 INJECTION INTRAVENOUS at 16:50

## 2022-07-13 RX ADMIN — GLYCOPYRROLATE 0.4 MG: 0.2 INJECTION INTRAMUSCULAR; INTRAVENOUS at 18:26

## 2022-07-13 RX ADMIN — LIDOCAINE HYDROCHLORIDE 50 MG: 20 INJECTION, SOLUTION EPIDURAL; INFILTRATION; INTRACAUDAL; PERINEURAL at 16:37

## 2022-07-13 RX ADMIN — ALBUMIN HUMAN 250 ML: 0.05 INJECTION, SOLUTION INTRAVENOUS at 18:41

## 2022-07-13 NOTE — ANESTHESIA PREPROCEDURE EVALUATION
Anesthesia Evaluation     no history of anesthetic complications:  NPO Solid Status: > 8 hours  NPO Liquid Status: > 8 hours           Airway   Mallampati: I  TM distance: >3 FB  Neck ROM: full  No difficulty expected  Dental    (+) edentulous    Pulmonary    (+) a smoker Current,   Cardiovascular   Exercise tolerance: poor (<4 METS)    (+) hypertension, CAD, CHF , PVD, hyperlipidemia,     ROS comment: Stress test 6/21/22  · Myocardial perfusion imaging indicates a medium-sized infarct located in the basal to mid inferior wall with no significant ischemia noted.  · Left ventricular ejection fraction is normal. There is akinesis of the basal to mid inferior wall. (Calculated EF = 60%).  · Impressions are consistent with a low risk study.     Echo 6/2022  · Left ventricular ejection fraction appears to be 56 - 60%. Left ventricular systolic function is normal.  · Left ventricular wall thickness is consistent with moderate concentric hypertrophy.  · The following left ventricular wall segments are akinetic: basal inferior.  · Left ventricular diastolic function is consistent with (grade I) impaired relaxation.  · Normal right ventricular cavity size and systolic function noted.  · There is no significant (greater than mild) valvular dysfunction.         Neuro/Psych  (+) psychiatric history Anxiety and Depression,    (-) seizures, TIA, CVA  GI/Hepatic/Renal/Endo    (+) obesity,   renal disease CRI,   (-) liver disease, diabetes    Musculoskeletal     Abdominal    Substance History      OB/GYN          Other                        Anesthesia Plan    ASA 3     general     intravenous induction     Anesthetic plan, risks, benefits, and alternatives have been provided, discussed and informed consent has been obtained with: patient.        CODE STATUS:

## 2022-07-13 NOTE — OP NOTE
"Campbell Casas  7/13/2022     PREOPERATIVE DIAGNOSIS: Aortoiliac occlusive disease (HCC) [I74.09]  Preop testing [Z01.818]     POSTOPERATIVE DIAGNOSIS: Post-Op Diagnosis Codes:     * Aortoiliac occlusive disease (HCC) [I74.09]     * Preop testing [Z01.818]     PROCEDURE PERFORMED:   1.  Left common femoral artery cutdown/exposure  2.  Introduction of catheter/sheath into the aorta  3.  Aortoiliac angiogram with radiographic supervision and interpretation  4.  Contralateral cannulation of the right common iliac artery  5.  Intravascular lithotripsy using the 6 x 60 mm shockwave balloon in the right common femoral/proximal superficial femoral arteries  6.  Intravascular lithotripsy using the 6 x 60 mm shockwave balloon in the right and left external iliac arteries.   7.  Left common femoral endarterectomy with bovine patch angioplasty and eversion profundoplasty     SURGEON: Esequiel Vaz DO   Assistant: Varghese Gay MD     ANESTHESIA: General    PREPARATION: Routine.    STAFF: Circulator: Shannon Simon RN; Samira Chaparro RN  Scrub Person: Renee Cross  Assistant: Eunice Borjas  Vascular Radiology Technician: Winter Chester Kari A    Estimated Blood Loss: 100ml    SPECIMENS: Femoral plaque     COMPLICATIONS: None    INDICATIONS: Campbell Casas is a 67 y.o. male who reports he has been having pain to his legs for couple years but has worsened.  Upon questioning, he really describes bilateral hip and buttock pain.  He reports they just \"lock up\".  He is a current daily smoker.  He does take Lipitor daily.  They both hurt equally.  He did have noninvasive testing performed today, which I did review in office. The indications, risks, and possible complications of the procedure were explained to the patient, who voiced understanding and wished to proceed with surgery.     PROCEDURE IN DETAIL: The patient was taken to the operating room and placed on the operating table in a supine position. After " general anesthesia was obtained, the bilateral groins was prepped and draped in a sterile manner.  An oblique incision was made in the left groin overlying the inguinal ligament with a 15 blade.  Careful dissection was made down through the subcutaneous tissues using Bovie cautery to ensure hemostasis.  Any crossing veins were ligated with hemoclips. The inguinal ligament was identified and just inferior to that the common femoral sheath was entered using the Metzenbaum scissors.  The common femoral artery was identified and freed from its local attachments proximally and distally.  Proximal and distal control was established with Vesseloops.  The patient was given 7000 units of intravenous heparin.  Under direct visualization, and using a micropuncture needle, the common femoral artery was directly cannulated and a micro sheath was placed.  Advantage Glidewire was advanced into the aorta and a short 6 Welsh sheath was placed.  The Omni Flush catheter was advanced to the aorta and an aortoiliac angiogram was performed.  Findings are as follows:  1.  Patent aorta without stenosis  2.  Patent iliac systems bilaterally with evidence of severe stenosis in both proximal external iliac arteries.     Contralateral cannulation was established of the right common iliac artery.  The catheter was then brought down to the level of the femoral head.  An angiogram was performed.  Findings are as follows:  1.  Patent common femoral, profunda femoris, and proximal/mid SFA with evidence of severe stenosis in the distal common femoral artery as it entered the proximal SFA.     At this point, the decision was made to treat these 3 areas.  A 6 Welsh by 45 cm destination sheath was placed up and over the aortic bifurcation into the distal right external iliac artery.  An 014 advantage Glidewire was placed down into the mid SFA.  Intravascular lithotripsy using the 6 x 60 mm shockwave balloon was then used to balloon angioplasty the  distal common femoral and proximal superficial femoral arteries.  Approximately 100 pulses were applied to the artery, cracking the plaque and increasing the compliance.  An angiogram was performed which showed rapid flow down through this area without any residual stenosis, dissection, or occlusion.  The sheath was then pulled back into the proximal right common iliac artery.  The distal common iliac and proximal external iliac arteries were then addressed with intravascular lithotripsy using the same 6 x 60 mm shockwave balloon.  Approximately 100 pulses were delivered increasing the compliance of the artery.  Completion angiogram showed rapid flow down through this area without any residual stenosis, dissection, or occlusion.  The sheath was then pulled back up over the bifurcation and down into the distal left external iliac artery.  Intravascular lithotripsy using the same 6 x 60 mm shockwave balloon was then used to deliver the last 100 pulses to the distal left common and proximal external iliac arteries.  Increased compliance of the artery was achieved.  Completion angiogram was performed which showed rapid flow through the aortoiliac segments without any residual stenosis, dissection, or occlusion.  At this point, I felt no further intervention endovascularly was warranted.  The sheath and wire were removed.  The artery was clamped proximally and distally in the left groin.  The Yadav scissors was then used to open the common femoral artery proximally and distally.  Using a freer elevator the standard endarterectomy was then performed along with eversion profundoplasty.  The wound bed was irrigated with heparinized saline and all loose debris was picked clean.  The bovine pericardial patch was then brought to the field.  The patch anastomosis was then performed with a 5-0 Prolene in a running fashion.  Both sutures were brought down to the midline.  The patch distally was then appropriately fashioned and  both sutures were brought up to the midline.  Prior to completion of the patch anastomosis the appropriate flushing maneuvers were performed and the anastomosis was completed.  Flow was reestablished down the left leg.  Gelfoam and thrombin was used to help ensure hemostasis.  The wound bed was copiously irrigated with antibiotic saline.  The deep layers were closed in 2 separate layers of 2-0 Vicryl in a running fashion.  The subcutaneous layers were closed with a 3-0 Vicryl in a running fashion.  Skin was then reapproximated using a 4-0 Monocryl in a subcuticular fashion.  The wound was then cleaned.  Sterile dressings were applied. The patient tolerated the procedure well. Sponge and needle counts were correct. The patient was then awakened and extubated in the operating room and taken to the recovery room in good condition.  Dr. Gay was present and assisted in the vital parts of the procedure which included left groin exposure, balloon angioplasty of the aortoiliac segments, and common femoral endarterectomy with bovine patch angioplasty.    Esequiel Vaz,   Date: 7/13/2022 Time: 18:34 CDT     CC:Yaa Fox, CARYN

## 2022-07-13 NOTE — ANESTHESIA PROCEDURE NOTES
Arterial Line    Pre-sedation assessment completed: 7/13/2022 3:41 PM    Patient reassessed immediately prior to procedure    Patient location during procedure: pre-op  Start time: 7/13/2022 3:42 PM  Stop Time:7/13/2022 3:45 PM       Line placed for hemodynamic monitoring.  Performed By   Anesthesiologist: Sommer Braxton MD  Preanesthetic Checklist  Completed: patient identified, IV checked, site marked, risks and benefits discussed, surgical consent, monitors and equipment checked, pre-op evaluation and timeout performed  Arterial Line Prep   Sterile Tech: cap, gloves and mask  Prep: ChloraPrep  Patient monitoring: continuous pulse oximetry  Arterial Line Procedure   Laterality:right  Location:  radial artery  Catheter size: 20 G   Guidance: ultrasound guided  PROCEDURE NOTE/ULTRASOUND INTERPRETATION.  Using ultrasound guidance the potential vascular sites for insertion of the catheter were visualized to determine the patency of the vessel to be used for vascular access.  After selecting the appropriate site for insertion, the needle was visualized under ultrasound being inserted into the radial artery, followed by ultrasound confirmation of wire and catheter placement. There were no abnormalities seen on ultrasound; an image was taken; and the patient tolerated the procedure with no complications.   Number of attempts: 1  Successful placement: yes  Post Assessment   Dressing Type: secured with tape and wrist guard applied.   Complications no  Circ/Move/Sens Assessment: normal and unchanged.   Patient Tolerance: patient tolerated the procedure well with no apparent complications

## 2022-07-13 NOTE — H&P
"7/12/2022         Yaa Fox APRN  [address] [address]        Campbell Casas  1955          Chief Complaint   Patient presents with   • Follow-up       1 Week Follow Up For Aortoiliac Occlusive Disease. Test [id number] CT pad angiogram abd pelvis w wo. Patient denies any stroke like symptoms.    • Smoker       Patient is a Current Everyday Smoker    • Med Management       Verbally verified medications with patient          Dear Yaa Fox APRN           HPI  I had the pleasure of seeing your patient Campbell Casas in the office today.   As you recall, Campbell Casas is a 67 y.o.  male who you are currently following for routine health maintenance.  He does report he has been having pain to his legs for couple years but has worsened.  Upon questioning, he really describes bilateral hip and buttock pain.  He reports they just \"lock up\".  He is a current daily smoker.  He does take Lipitor daily.  They both hurt equally.  He did have noninvasive testing performed today, which I did review in office.     Past Medical History:   Diagnosis Date   • Anxiety    • CHF (congestive heart failure) (HCC)     Acute   • Depression    • Hypertension      Past Surgical History:   Procedure Laterality Date   • KNEE ARTHROSCOPY Left      No family history on file.    Social History     Tobacco Use   • Smoking status: Current Every Day Smoker     Packs/day: 1.25     Start date: 1975   • Smokeless tobacco: Never Used   Vaping Use   • Vaping Use: Never used   Substance Use Topics   • Alcohol use: Not Currently   • Drug use: Never     No Known Allergies  Current Outpatient Medications   Medication Instructions   • albuterol sulfate  (90 Base) MCG/ACT inhaler 2 puffs, Inhalation, Every 4 Hours PRN   • amLODIPine (NORVASC) 5 mg, Oral, Every Night at Bedtime   • aspirin 81 mg, Oral, Daily   • atorvastatin (LIPITOR) 80 mg, Oral, Every Night at Bedtime   • diazePAM (VALIUM) 10 mg, Oral, 3 Times Daily PRN   • " "FLUoxetine (PROZAC) 10 mg, Oral, Daily   • HYDROcodone-acetaminophen (NORCO) 7.5-325 MG per tablet 1 tablet, Oral, Every 4 Hours PRN   • lisinopril (PRINIVIL,ZESTRIL) 20 mg, Oral, Daily   • metoprolol succinate XL (TOPROL-XL) 100 mg, Oral, Every Night at Bedtime   • tamsulosin (FLOMAX) 0.4 MG capsule 24 hr capsule 1 capsule, Oral, Every Night at Bedtime          Review of Systems   Constitutional: Negative.    HENT: Negative.    Eyes: Negative.    Respiratory: Negative.    Cardiovascular: Negative.    Gastrointestinal: Negative.    Endocrine: Negative.    Genitourinary: Negative.    Musculoskeletal: Positive for arthralgias.        Bilateral hip pain   Skin: Negative.    Allergic/Immunologic: Negative.    Neurological: Negative.    Hematological: Negative.    Psychiatric/Behavioral: Negative.    All other systems reviewed and are negative.        /72 (BP Location: Right arm, Patient Position: Sitting, Cuff Size: Adult)   Pulse 68   Ht 170.2 cm (67\")   Wt 86.2 kg (190 lb)   SpO2 97%   BMI 29.76 kg/m²   Physical Exam  Vitals and nursing note reviewed.   Constitutional:       Appearance: Normal appearance. He is well-developed and overweight.   HENT:      Head: Normocephalic and atraumatic.   Eyes:      General: No scleral icterus.     Pupils: Pupils are equal, round, and reactive to light.   Neck:      Thyroid: No thyromegaly.   Cardiovascular:      Rate and Rhythm: Normal rate and regular rhythm.      Pulses:           Femoral pulses are 1+ on the right side and 1+ on the left side.       Dorsalis pedis pulses are detected w/ Doppler on the right side.        Posterior tibial pulses are 2+ on the right side.      Heart sounds: Normal heart sounds.      Comments: Right: doppler DP/peroneal  Left: doppler DP/PT/peroneal  Pulmonary:      Effort: Pulmonary effort is normal.      Breath sounds: Normal breath sounds.   Abdominal:      General: Bowel sounds are normal.      Palpations: Abdomen is soft. "   Musculoskeletal:         General: Normal range of motion.      Cervical back: Normal range of motion and neck supple.   Skin:     General: Skin is warm and dry.   Neurological:      General: No focal deficit present.      Mental Status: He is alert and oriented to person, place, and time.   Psychiatric:         Mood and Affect: Mood normal.         Behavior: Behavior normal. Behavior is cooperative.         Thought Content: Thought content normal.         Judgment: Judgment normal.            CT Head Without Contrast     Result Date: 5/28/2022  Narrative: EXAMINATION: CT head without contrast 5/28/2022  HISTORY: Head trauma  FINDINGS: Multiple contiguous axials are obtained from the skull base to the vertex per protocol findings contrast-enhancement with reformatted images obtained in the sagittal and coronal projections from the original data set.  There is evidence of a previous left posterior cerebral artery distribution infarct with encephalomalacia. There is also a focus of hypodensity within the right basal ganglia suggesting previous lacunar infarction as well as remote right cerebellar hemisphere infarct with focal encephalomalacia.. There is no evidence of mass, mass effect or shift of the midline. No evidence of acute infarct or hemorrhage.  No acute calvarial abnormalities are present. The visualized mastoid air cells and paranasal sinuses are normally aerated. The orbits are intact.       Impression: 1.. Mild atrophy and small vessel disease. 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 There is also a remote infarct involving the right cerebellar hemisphere. 2. No evidence of acute posttraumatic injury to the brain. This report was finalized on 05/28/2022 14:11 by Dr. Alvarez Betancourt MD.     CT Cervical Spine Without Contrast     Result Date: 5/28/2022  Narrative: CT CERVICAL SPINE WO CONTRAST- 5/28/2022 1:34 PM CDT  HISTORY: Neck trauma (Age >= 65y)  COMPARISON:  None  DOSE LENGTH PRODUCT: 442 mGy cm. All CT scans are performed using dose optimization techniques as appropriate to the performed exam and including at least one of the following: Automated exposure control, adjustment of the mA and/or kV according to size, and the use of the iterative reconstruction technique.  TECHNIQUE: Serial helical tomographic images of the cervical spine were obtained without the use of intravenous contrast. Additionally, sagittal and coronal reformatted images were also provided for review.  FINDINGS: Alignment: Normal.  Bones: There is no evidence of fracture. Vertebral body heights are maintained.  Disc spaces: Maintained.  Canal and neuroforamina: Left-sided foraminal narrowing is noted at C3-C4 related to asymmetric facet hypertrophy and spurring as well as uncinate spurring.  Soft tissues: Unremarkable.  Lung apices: Clear. No pneumothorax.       Impression: 1. No evidence of acute osseous injury or malalignment in the cervical spine. 2. Left-sided foraminal narrowing at C3-C4 related to facet and uncovertebral hypertrophy and spurring.   This report was finalized on 05/28/2022 14:07 by Dr. Alvarez Betancourt MD.     CT Angiogram Abdomen Pelvis     Result Date: 5/31/2022  Narrative: EXAMINATION: CT ANGIOGRAM ABDOMEN PELVIS-   5/31/2022 8:44 AM CDT  HISTORY: aortoiliac disease; I74.09-Other arterial embolism and thrombosis of abdominal aorta  In order to have a CT radiation dose as low as reasonably achievable Automated Exposure Control was utilized for adjustment of the mA and/or KV according to patient size.  DLP in mGycm= 1433  The CT angiography of the abdomen and pelvis is performed after intravenous contrast enhancement. Images are acquired in axial plane with subsequent 2-D reconstruction in coronal and sagittal planes and 3-D maximum intensity projection reconstruction.  There is no previous study for comparison.  The correlation is made with sonography of the abdomen dated  1/27/2021.  Atheromatous changes of the abdominal aorta and iliac arteries is seen. No aneurysmal dilatation.  An atheromatous plaque is seen at the origin of the celiac trunk. No stenosis. There is a large noncalcified atheromatous plaque in the proximal superior mesenteric artery. Up to 75% long segment stenosis. The remaining superior mesenteric arterial branches appear normal.  There are calcific plaques at the origin of both renal arteries. No significant stenosis.  The origin of the inferior mesenteric artery is not opacified from the abdominal aorta. However it appears to be opacified from the collateral circulation.  A large atheromatous plaque/partial lumen mural thrombosis of the distal abdominal aorta is seen with approximately less than 50% diameter stenosis.  Atheromatous plaques are seen in both common internal and external iliac arteries. There is mild ectasia of the proximal right common iliac artery measuring 13 mm in diameter.  There is a large calcific plaque at the origin of the right superficial femoral artery with high-grade stenosis. The remaining superficial and deep femoral arteries appear unremarkable.  The lung bases seen: The study is unremarkable except for scarring and atelectasis.  The liver and spleen appear unremarkable.  Moderate lobulation of the right adrenal gland. There are small nodules in both limbs of the right adrenal gland. The left IJ catheter is normal.  Moderate lobulation and renal contour bilaterally seen. No discrete mass. No calculi. No hydronephrosis. The ureters appear normal. The urinary bladder is moderately well distended. No intrinsic abnormality.  The prostate is moderately enlarged with intrinsic calcifications.  There are fat containing inguinal hernias bilaterally. There is a tiny fat-containing umbilical hernia.  The stomach duodenum and small bowel appear normal. A normal appendix is seen. Moderate gas and stool is seen in the colon. No finding to suggest  obstruction. There is diverticulosis of the distal colon. No evidence for diverticulitis.  There is no evidence of abdominal or pelvic lymphadenopathy.  Images reviewed in bone window show no acute bony abnormality. Moderate chronic degenerative changes of the lumbar spine are seen.       Impression: 1. Severe atheromatous changes of the abdominal aorta is ectatic and femoral arteries. No aneurysmal dilatation. 2. 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 3. A long segment noncalcific atheromatous plaque in the proximal right superficial femoral artery with up to 75% stenosis. The subsequent superior mesenteric arterial branches are normal. 4. Mild ectasia of the right common iliac artery. 5. Significant, high-grade, stenosis of the proximal right superficial femoral artery. 6. Other nonvascular findings as detailed above. This report was finalized on 05/31/2022 12:03 by Dr. Darryn Kebede MD.     US Ankle / Brachial Indices Extremity Complete     Result Date: 5/18/2022  Narrative: EXAMINATION: US ANKLE / BRACHIAL INDICES EXTREMITY COMPLETE- 5/18/2022 3:38 PM CDT  HISTORY: i70.211; I70.211-Atherosclerosis of native arteries of extremities with intermittent claudication, right leg US ANKLE / BRACHIAL INDICES EXTREMITY COMPLETE- 5/18/2022 2:17 PM CDT  REPORT: Bilateral sonographic lower extremity arterial evaluation was performed with multi-level Doppler analysis and pulse volume recordings with segmental pressures obtained at rest and stress.  The right DK equals 0.68. The Doppler waveforms are biphasic. These findings are consistent with moderate arterial insufficiency of the right lower extremity at rest.  The left DK equals 0.56. The Doppler waveforms are monophasic. These findings are consistent with severe arterial insufficiency of the left lower extremity at rest.  No exercise protocol was performed.       Impression:  1.  Moderate arterial  insufficiency of the right lower extremity at rest. 2.  Severe arterial insufficiency of the left lower extremity at rest. 3. Vascular surgery consult is recommended.      This report was finalized on 05/18/2022 15:41 by Dr. Luis F Ramirez MD.            Patient Active Problem List   Diagnosis   • Tobacco abuse   • Hypertension   • Other hyperlipidemia   • PAD (peripheral artery disease) ([facility])         Visit Diagnosis       ICD-10-CM ICD-9-CM   1. Aortoiliac occlusive disease (HCC)  I74.09 444.09   2. Preop testing  Z01.818 V72.84   3. Encounter for monitoring antiplatelet therapy  Z51.81 V58.83     Z79.02 V58.63   4. Primary hypertension  I10 401.9   5. Other hyperlipidemia  E78.49 272.4   6. Tobacco abuse  Z72.0 305.1               Plan: After thoroughly evaluating Campbell Casas, I believe the best course of action is to proceed with a left common femoral endarterectomy with bilateral iliac stenting.  I did review his testing very closely.  Risks of angiogram were discussed.  These include, but are not limited to, bleeding, infection, vessel damage, nerve damage, embolus, and loss of limb.  The patient understands these risks and wishes to proceed with procedure.  I would also like him to begin taking aspirin 81 mg enteric-coated on a daily basis.  I did discuss vascular risk factors as they pertain to the progression of vascular disease including controlling his hypertension, hyperlipidemia, and smoking cessation.  His blood pressure stable on his current medications.  He is maintained on Lipitor for his hyperlipidemia.  Unfortunately, he is a daily smoker and has no desire for smoking at this time.  The patient can continue taking their current medication regimen as previously planned.  This was all discussed in full with complete understanding.     Thank you for allowing me to participate in the care of your patient.  Please do not hesitate with any questions or concerns.  I will keep you aware of any further  encounters with Campbell Casas.           Sincerely yours,           Esequiel Vaz, DO

## 2022-07-13 NOTE — ANESTHESIA PROCEDURE NOTES
Airway  Urgency: elective    Date/Time: 7/13/2022 4:37 PM  Airway not difficult    General Information and Staff    Patient location during procedure: OR  CRNA/CAA: Jarvis Mohan CRNA    Indications and Patient Condition  Indications for airway management: airway protection    Preoxygenated: yes  Mask difficulty assessment: 1 - vent by mask    Final Airway Details  Final airway type: endotracheal airway      Successful airway: ETT  Cuffed: yes   Successful intubation technique: direct laryngoscopy  Facilitating devices/methods: intubating stylet  Endotracheal tube insertion site: oral  Blade: Elam  Blade size: 2  ETT size (mm): 8.0  Cormack-Lehane Classification: grade I - full view of glottis  Placement verified by: chest auscultation and capnometry   Measured from: lips  ETT/EBT  to lips (cm): 22  Number of attempts at approach: 1  Assessment: lips, teeth, and gum same as pre-op and atraumatic intubation

## 2022-07-14 ENCOUNTER — READMISSION MANAGEMENT (OUTPATIENT)
Dept: CALL CENTER | Facility: HOSPITAL | Age: 67
End: 2022-07-14

## 2022-07-14 VITALS
HEIGHT: 67 IN | SYSTOLIC BLOOD PRESSURE: 179 MMHG | HEART RATE: 104 BPM | RESPIRATION RATE: 18 BRPM | DIASTOLIC BLOOD PRESSURE: 85 MMHG | OXYGEN SATURATION: 96 % | BODY MASS INDEX: 30.45 KG/M2 | TEMPERATURE: 97.1 F | WEIGHT: 194 LBS

## 2022-07-14 PROCEDURE — 25010000002 CEFAZOLIN PER 500 MG: Performed by: SURGERY

## 2022-07-14 PROCEDURE — 94799 UNLISTED PULMONARY SVC/PX: CPT

## 2022-07-14 PROCEDURE — 94761 N-INVAS EAR/PLS OXIMETRY MLT: CPT

## 2022-07-14 PROCEDURE — 99024 POSTOP FOLLOW-UP VISIT: CPT | Performed by: NURSE PRACTITIONER

## 2022-07-14 RX ORDER — CLOPIDOGREL BISULFATE 75 MG/1
75 TABLET ORAL DAILY
Qty: 30 TABLET | Refills: 5 | Status: SHIPPED | OUTPATIENT
Start: 2022-07-14 | End: 2023-01-06

## 2022-07-14 RX ORDER — HYDROCODONE BITARTRATE AND ACETAMINOPHEN 5; 325 MG/1; MG/1
1 TABLET ORAL EVERY 6 HOURS PRN
Qty: 30 TABLET | Refills: 0 | Status: SHIPPED | OUTPATIENT
Start: 2022-07-14 | End: 2022-07-27

## 2022-07-14 RX ADMIN — HYDROCODONE BITARTRATE AND ACETAMINOPHEN 1 TABLET: 5; 325 TABLET ORAL at 05:52

## 2022-07-14 RX ADMIN — CEFAZOLIN SODIUM 2 G: 10 INJECTION, POWDER, FOR SOLUTION INTRAVENOUS at 00:51

## 2022-07-14 RX ADMIN — ASPIRIN 81 MG: 81 TABLET, COATED ORAL at 08:54

## 2022-07-14 RX ADMIN — FLUOXETINE 10 MG: 10 CAPSULE ORAL at 08:54

## 2022-07-14 RX ADMIN — METOPROLOL SUCCINATE 100 MG: 100 TABLET, EXTENDED RELEASE ORAL at 08:54

## 2022-07-14 RX ADMIN — DIAZEPAM 10 MG: 10 TABLET ORAL at 04:27

## 2022-07-14 RX ADMIN — LISINOPRIL 20 MG: 20 TABLET ORAL at 08:54

## 2022-07-14 RX ADMIN — AMLODIPINE BESYLATE 5 MG: 5 TABLET ORAL at 08:54

## 2022-07-14 RX ADMIN — CLOPIDOGREL 75 MG: 75 TABLET, FILM COATED ORAL at 08:54

## 2022-07-14 RX ADMIN — HYDROCODONE BITARTRATE AND ACETAMINOPHEN 1 TABLET: 5; 325 TABLET ORAL at 09:11

## 2022-07-14 RX ADMIN — TAMSULOSIN HYDROCHLORIDE 0.4 MG: 0.4 CAPSULE ORAL at 08:54

## 2022-07-14 NOTE — PLAN OF CARE
Goal Outcome Evaluation:  Plan of Care Reviewed With: patient           Outcome Evaluation: Pt. transfer from PACU to room 394. +2 Doppler pulses bilaterally upon arrival. Left femoral dressing clean, dry & intact; soft to touch. Supine until 2300. Pt. had intermittment confusion throughtout the night and pulled right hand IV out. IVF infusing through left IV. Bed alarm set. 3L O2. Voiding. Regular diet. Safety maintained.

## 2022-07-14 NOTE — ANESTHESIA POSTPROCEDURE EVALUATION
"Patient: Campbell Casas    Procedure Summary     Date: 07/13/22 Room / Location: [facility] OR  /  PAD HYBRID OR 12    Anesthesia Start: 1636 Anesthesia Stop: 1833    Procedures:       LEFT COMMON FEMORAL ENDARTERECTOMY WITH BILATERAL ILIAC STENTING (Left Groin)      LEFT COMMON FEMORAL ENDARTERECTOMY WITH BILATERAL ILIAC STENTING (N/A Groin) Diagnosis:       Aortoiliac occlusive disease (HCC)      Preop testing      (Aortoiliac occlusive disease (HCC) [I74.09])      (Preop testing [Z01.818])    Surgeons: Esequiel Vaz DO Provider: Jarvis Mohan CRNA    Anesthesia Type: general ASA Status: 3          Anesthesia Type: general    Vitals  Vitals Value Taken Time   /47 07/13/22 2058   Temp 97.4 °F (36.3 °C) 07/13/22 2058   Pulse 79 07/13/22 2100   Resp 16 07/13/22 2058   SpO2 95 % 07/13/22 2100   Vitals shown include unvalidated device data.        Post Anesthesia Care and Evaluation    Patient location during evaluation: PACU  Patient participation: complete - patient participated  Level of consciousness: awake and alert  Pain management: adequate    Airway patency: patent  Anesthetic complications: No anesthetic complications    Cardiovascular status: acceptable  Respiratory status: acceptable  Hydration status: acceptable    Comments: Blood pressure 120/59, pulse 99, temperature 97.9 °F (36.6 °C), temperature source Oral, resp. rate 16, height 169 cm (66.54\"), weight 88 kg (194 lb 0.1 oz), SpO2 94 %.    Pt discharged from PACU based on opal score >8      "

## 2022-07-14 NOTE — DISCHARGE SUMMARY
"  Date of Discharge:  7/14/2022    Discharge Diagnosis: Aortoiliac occlusive disease ([facility]) [I74.09]    Presenting Problem/History of Present Illness  Aortoiliac occlusive disease ([facility]) [I74.09]  Preop testing [Z01.818]  PAD (peripheral artery disease) ([facility]) [I73.9]       Hospital Course  Patient is a 67 y.o. male who reports he has been having pain to his legs for couple years but has worsened.  Upon questioning, he really describes bilateral hip and buttock pain.  He reports they just \"lock up\".  He did undergo aortoiliac angiogram with intravascular lithotripsy of the right common femoral/proximal superficial femoral arteries and right and left external iliac arteries with a left common femoral endarterectomy without incident.  Overnight he has done well.  His vitals remained stable.  His feet are nice and warm with strong Doppler signals present.  His groin is soft with no evidence of hematoma.  His medications will stay the same with the addition of Plavix and postoperative pain medication.  He is stable for discharge.  We will see him back in 2 weeks for postoperative follow-up.  Written and verbal instructions were given to the patient.  This was all discussed in full with complete understanding.    Procedures Performed  Procedure(s):  LEFT COMMON FEMORAL ENDARTERECTOMY WITH BILATERAL ILIAC STENTING  LEFT COMMON FEMORAL ENDARTERECTOMY WITH BILATERAL ILIAC STENTING       Consults:   Consults     No orders found for last 30 day(s).            Condition on Discharge: Stable    Discharge Medications     Discharge Medications      New Medications      Instructions Start Date   clopidogrel 75 MG tablet  Commonly known as: PLAVIX   75 mg, Oral, Daily      HYDROcodone-acetaminophen 5-325 MG per tablet  Commonly known as: NORCO   1 tablet, Oral, Every 6 Hours PRN         Continue These Medications      Instructions Start Date   albuterol sulfate  (90 Base) MCG/ACT inhaler  Commonly known as: PROVENTIL " HFA;VENTOLIN HFA;PROAIR HFA   2 puffs, Inhalation, Every 4 Hours PRN      amLODIPine 5 MG tablet  Commonly known as: NORVASC   5 mg, Oral, Every Night at Bedtime      aspirin 81 MG EC tablet   81 mg, Oral, Daily      atorvastatin 80 MG tablet  Commonly known as: LIPITOR   80 mg, Oral, Every Night at Bedtime      diazePAM 10 MG tablet  Commonly known as: VALIUM   10 mg, Oral, 3 Times Daily PRN      FLUoxetine 10 MG capsule  Commonly known as: PROzac   10 mg, Oral, Daily      lisinopril 20 MG tablet  Commonly known as: PRINIVIL,ZESTRIL   20 mg, Oral, Daily      metoprolol succinate  MG 24 hr tablet  Commonly known as: TOPROL-XL   100 mg, Oral, Every Night at Bedtime      tamsulosin 0.4 MG capsule 24 hr capsule  Commonly known as: FLOMAX   1 capsule, Oral, Every Night at Bedtime             Discharge Diet:   Diet Instructions     Diet: Regular; Thin      Discharge Diet: Regular    Fluid Consistency: Thin          Activity at Discharge:   Activity Instructions     Bathing Restrictions      Type of Restriction: Bathing    Bathing Restrictions: Other    Explain Bathing Restrictions: may shower tomorrow    Driving Restrictions      Type of Restriction: Driving    Driving Restrictions: No Driving (Time Limited)    Length: 1 Week    Lifting Restrictions      Type of Restriction: Lifting    Lifting Restrictions: Lifting Restriction (Indicate Limit)    Weight Limit (Pounds): 10    Length of Lifting Restriction: 1 week    Other Activity Restrictions      Type of Restriction: Other    Explain Other Restrictions: no bending, squatting, or straining    Work Restrictions      Type of Restriction: Work    May Return to Work: In 1 Week    With / Without Restrictions: Without Restrictions          Follow-up Appointments  Future Appointments   Date Time Provider Department Center   7/27/2022  1:30 PM Reyna Lopez APRN [facility] VS PAD PAD   8/19/2022  8:30 AM Mason Yap APRN [facility] CD PAD PAD     Additional Instructions for  the Follow-ups that You Need to Schedule     Discharge Follow-up with Specialty: Dr. Vaz/Reyna Lopez APRN; 2 Weeks   As directed      Specialty: Dr. Vaz/Reyna RUBIN    Follow Up: 2 Weeks                I did spend more than 30 minutes reviewing the chart, face to face encounter, and organizing discharge.    CARYN Boyd  07/14/22  07:44 CDT

## 2022-07-15 LAB
CYTO UR: NORMAL
LAB AP CASE REPORT: NORMAL
Lab: NORMAL
PATH REPORT.FINAL DX SPEC: NORMAL
PATH REPORT.GROSS SPEC: NORMAL

## 2022-07-15 NOTE — OUTREACH NOTE
Prep Survey    Flowsheet Row Responses   [facility] facility patient discharged from? [place]   Is LACE score < 7 ? No   Emergency Room discharge w/ pulse ox? No   Eligibility Readm Mgmt   Discharge diagnosis Aortoiliac occlusive disease    Does the patient have one of the following disease processes/diagnoses(primary or secondary)? Other   Does the patient have Home health ordered? No   Is there a DME ordered? No   Prep survey completed? Yes          DOUGIE FREDERICK - Registered Nurse

## 2022-07-18 ENCOUNTER — APPOINTMENT (OUTPATIENT)
Dept: CARDIOLOGY | Facility: HOSPITAL | Age: 67
End: 2022-07-18

## 2022-07-26 ENCOUNTER — TELEPHONE (OUTPATIENT)
Dept: VASCULAR SURGERY | Facility: CLINIC | Age: 67
End: 2022-07-26

## 2022-07-27 ENCOUNTER — OFFICE VISIT (OUTPATIENT)
Dept: VASCULAR SURGERY | Facility: CLINIC | Age: 67
End: 2022-07-27

## 2022-07-27 VITALS
WEIGHT: 190 LBS | RESPIRATION RATE: 18 BRPM | BODY MASS INDEX: 29.82 KG/M2 | DIASTOLIC BLOOD PRESSURE: 72 MMHG | HEIGHT: 67 IN | SYSTOLIC BLOOD PRESSURE: 120 MMHG

## 2022-07-27 DIAGNOSIS — E78.49 OTHER HYPERLIPIDEMIA: ICD-10-CM

## 2022-07-27 DIAGNOSIS — I74.09 AORTOILIAC OCCLUSIVE DISEASE: Primary | ICD-10-CM

## 2022-07-27 DIAGNOSIS — I10 ESSENTIAL HYPERTENSION: ICD-10-CM

## 2022-07-27 DIAGNOSIS — I73.9 PAD (PERIPHERAL ARTERY DISEASE): ICD-10-CM

## 2022-07-27 DIAGNOSIS — I65.23 BILATERAL CAROTID ARTERY STENOSIS: ICD-10-CM

## 2022-07-27 DIAGNOSIS — Z72.0 TOBACCO ABUSE: ICD-10-CM

## 2022-07-27 PROCEDURE — 99024 POSTOP FOLLOW-UP VISIT: CPT | Performed by: NURSE PRACTITIONER

## 2022-07-27 RX ORDER — FENOFIBRATE 54 MG/1
54 TABLET ORAL DAILY
COMMUNITY
Start: 2022-06-20

## 2022-07-27 RX ORDER — NICOTINE 21 MG/24HR
PATCH, TRANSDERMAL 24 HOURS TRANSDERMAL SEE ADMIN INSTRUCTIONS
COMMUNITY
Start: 2022-06-28 | End: 2022-08-25

## 2022-07-27 RX ORDER — LISINOPRIL AND HYDROCHLOROTHIAZIDE 25; 20 MG/1; MG/1
1 TABLET ORAL DAILY
COMMUNITY
Start: 2022-06-20 | End: 2022-08-25

## 2022-07-27 RX ORDER — ATOMOXETINE 40 MG/1
40 CAPSULE ORAL DAILY
COMMUNITY
Start: 2022-06-20

## 2022-07-27 RX ORDER — CILOSTAZOL 100 MG/1
100 TABLET ORAL 2 TIMES DAILY
COMMUNITY
Start: 2022-06-20

## 2022-08-02 ENCOUNTER — READMISSION MANAGEMENT (OUTPATIENT)
Dept: CALL CENTER | Facility: HOSPITAL | Age: 67
End: 2022-08-02

## 2022-08-02 NOTE — OUTREACH NOTE
Medical Week 3 Survey    Flowsheet Row Responses   [facility] facility patient discharged from? [place]   Does the patient have one of the following disease processes/diagnoses(primary or secondary)? Other   Week 3 attempt successful? No   Unsuccessful attempts Attempt 1          NILA Alcaraz Registered Nurse

## 2022-08-09 ENCOUNTER — READMISSION MANAGEMENT (OUTPATIENT)
Dept: CALL CENTER | Facility: HOSPITAL | Age: 67
End: 2022-08-09

## 2022-08-09 NOTE — OUTREACH NOTE
Medical Week 4 Survey    Flowsheet Row Responses   [facility] patient discharged from? [place]   Does the patient have one of the following disease processes/diagnoses(primary or secondary)? Other   Week 4 attempt successful? Yes   Call start time 1323   Call end time 1325   Discharge diagnosis Aortoiliac occlusive disease    Person spoke with today (if not patient) and relationship Patient   Meds reviewed with patient/caregiver? Yes   Is the patient having any side effects they believe may be caused by any medication additions or changes? No   Is the patient taking all medications as directed (includes completed medication regime)? Yes   Has the patient kept scheduled appointments due by today? Yes   Is the patient still receiving Home Health Services? N/A   Psychosocial issues? No   What is the patient's perception of their health status since discharge? Improving   Is the patient/caregiver able to teach back signs and symptoms related to disease process for when to call PCP? Yes   Is the patient/caregiver able to teach back signs and symptoms related to disease process for when to call 911? Yes   Is the patient/caregiver able to teach back the hierarchy of who to call/visit for symptoms/problems? PCP, Specialist, Home health nurse, Urgent Care, ED, 911 Yes   Week 4 Call Completed? Yes   Would the patient like one additional call? No   Graduated Yes   Is the patient interested in additional calls from an ambulatory ?  NOTE:  applies to high risk patients requiring additional follow-up. No   Did the patient feel the follow up calls were helpful during their recovery period? Yes   Was the number of calls appropriate? Yes   Does the patient have an Advance Directive or Living Will? No   Is the patient/caregiver familiar with Advance Care Planning? No   Would the patient like more information on Advance Care Planning? No   Wrap up additional comments Pt states he is doing ok,  pt was outside working.  No questions/concerns.          SALVADOR FELIPE - Registered Nurse

## 2022-08-15 ENCOUNTER — APPOINTMENT (OUTPATIENT)
Dept: CARDIOLOGY | Facility: HOSPITAL | Age: 67
End: 2022-08-15

## 2022-08-22 ENCOUNTER — HOSPITAL ENCOUNTER (OUTPATIENT)
Dept: ULTRASOUND IMAGING | Age: 67
Discharge: HOME | End: 2022-08-22
Payer: MEDICARE

## 2022-08-22 DIAGNOSIS — N18.4 CHRONIC KIDNEY DISEASE, STAGE IV (SEVERE) (HCC): ICD-10-CM

## 2022-08-22 PROCEDURE — 76770 US EXAM ABDO BACK WALL COMP: CPT

## 2022-08-22 PROCEDURE — 76770 US EXAM ABDO BACK WALL COMP: CPT | Performed by: RADIOLOGY

## 2022-08-24 ENCOUNTER — APPOINTMENT (OUTPATIENT)
Dept: CARDIOLOGY | Facility: HOSPITAL | Age: 67
End: 2022-08-24

## 2022-08-25 ENCOUNTER — OFFICE VISIT (OUTPATIENT)
Dept: CARDIOLOGY | Facility: CLINIC | Age: 67
End: 2022-08-25

## 2022-08-25 VITALS
SYSTOLIC BLOOD PRESSURE: 178 MMHG | HEIGHT: 66 IN | BODY MASS INDEX: 32.14 KG/M2 | HEART RATE: 91 BPM | DIASTOLIC BLOOD PRESSURE: 80 MMHG | OXYGEN SATURATION: 95 % | WEIGHT: 200 LBS

## 2022-08-25 DIAGNOSIS — I25.5 ISCHEMIC CARDIOMYOPATHY: ICD-10-CM

## 2022-08-25 DIAGNOSIS — I73.9 PAD (PERIPHERAL ARTERY DISEASE): ICD-10-CM

## 2022-08-25 DIAGNOSIS — I25.118 CORONARY ARTERY DISEASE OF NATIVE ARTERY OF NATIVE HEART WITH STABLE ANGINA PECTORIS: Primary | ICD-10-CM

## 2022-08-25 DIAGNOSIS — I10 ESSENTIAL HYPERTENSION: ICD-10-CM

## 2022-08-25 DIAGNOSIS — E78.5 DYSLIPIDEMIA: ICD-10-CM

## 2022-08-25 DIAGNOSIS — E66.09 CLASS 1 OBESITY DUE TO EXCESS CALORIES WITH SERIOUS COMORBIDITY AND BODY MASS INDEX (BMI) OF 32.0 TO 32.9 IN ADULT: ICD-10-CM

## 2022-08-25 DIAGNOSIS — Z72.0 TOBACCO ABUSE: ICD-10-CM

## 2022-08-25 DIAGNOSIS — N18.4 CKD (CHRONIC KIDNEY DISEASE) STAGE 4, GFR 15-29 ML/MIN: ICD-10-CM

## 2022-08-25 PROBLEM — E66.811 CLASS 1 DRUG-INDUCED OBESITY WITH SERIOUS COMORBIDITY AND BODY MASS INDEX (BMI) OF 32.0 TO 32.9 IN ADULT: Status: ACTIVE | Noted: 2022-08-25

## 2022-08-25 PROBLEM — E66.1 CLASS 1 DRUG-INDUCED OBESITY WITH SERIOUS COMORBIDITY AND BODY MASS INDEX (BMI) OF 32.0 TO 32.9 IN ADULT: Status: ACTIVE | Noted: 2022-08-25

## 2022-08-25 PROCEDURE — 99214 OFFICE O/P EST MOD 30 MIN: CPT | Performed by: NURSE PRACTITIONER

## 2022-08-25 RX ORDER — LISINOPRIL 40 MG/1
40 TABLET ORAL DAILY
COMMUNITY
Start: 2022-08-18

## 2022-08-25 NOTE — PROGRESS NOTES
Subjective:     Encounter Date: 08/25/2022      Patient ID: Campbell Casas is a 67 y.o. male with coronary artery disease, ischemic cardiomyopathy, peripheral artery disease, hypertension, dyslipidemia, CKD IV and tobacco abuse.     Chief Complaint: 2 month follow up   Coronary Artery Disease  Presents for follow-up visit. Pertinent negatives include no chest pain, chest pressure, chest tightness, dizziness, leg swelling, palpitations, shortness of breath or weight gain. The symptoms have been stable. Compliance with diet is good. Compliance with exercise is good. Compliance with medications is good.   Hypertension  This is a chronic problem. The current episode started more than 1 year ago. The problem has been waxing and waning since onset. Pertinent negatives include no chest pain, malaise/fatigue, orthopnea, palpitations, peripheral edema, PND or shortness of breath. Risk factors for coronary artery disease include dyslipidemia and smoking/tobacco exposure. Current antihypertension treatment includes calcium channel blockers, ACE inhibitors and beta blockers. Hypertensive end-organ damage includes CAD/MI.     Patient presents today for management of coronary artery disease. Patient was referred to Dr Mcdonough in 6/2022 for perioperative risk stratification prior to vascular surgery for aortoiliac occlusive disease. He was found to have significant peripheral arterial disease on DK and CT of abdomen in May 2022.  He underwent a nuclear stress test on 6/21/2022 that was low risk for ischemia. He underwent echo on 6/21/2022 that revealed LVEF 56-60%, moderate LVH, grade I diastolic dysfunction and no significant valvular disease. Patient underwent left common femoral endarterectomy with bilateral iliac stenting on 7/13/2022.  Today patient reports that he has been doing well since.   He reports that he has had trouble with incision healing. He reports that he has had some pain in his calves and lower legs. He reports  much improvement in his hip pain. He denies any chest pain. He reports that he has some dyspnea on exertion that is unchanged. He denies any heart racing or palpitations. He denies any dizziness or near syncope. He denies any leg swelling, orthopnea or PND. He reports that he hasnt been monitoring his blood pressure at home. He reports that at other offices it has been better controlled than it was here. He follows with Yaa RUBIN as PCP.     Previous Cardiac Testing and Procedures:  -Echo (8/17/2014) EF 40-45%, abnormal diastolic function, normal RV size and function, normal atria, mild TR and MR  -DSE (8/18/2014) possible old basal MI with akiko-infarct ischemia  -LHC (8/19/2014) 50% mid LAD,  of mid RCA with filling via left to right collaterals, EF 40%  -proBNP (7/7/2019) 793, normal 0-900  -DK (5/18/2022) moderate arterial insufficiency of the right lower extremity at rest, severe arterial insufficiency of the left lower extremity at rest  -CTA of the abdomen (5/31/2022) severe atheromatous changes of the abdominal aorta, partial luminal intramural thrombosis of the distal abdominal aorta with less than 50% stenosis, long segment of noncalcified atheromatous plaque along the proximal to mid right SFA with up to 70% stenosis significant high-grade stenosis of the proximal right SFA  -BMP (6/13/2022) creatinine 2.48, GFR 28, BUN 46, potassium 4.2, sodium 138  -Lipid panel (6/13/2022): total cholesterol 162, HDL 28, LDL 93, triglycerides 240  -Echo (6/21/2022):LVEF 56-60%, moderate LVH, grade I diastolic dysfunction and no significant valvular disease.  -Nuclear stress test (6/21/2022):  low risk for ischemia     The following portions of the patient's history were reviewed and updated as appropriate: allergies, current medications, past family history, past medical history, past social history, past surgical history and problem list.    No Known Allergies    Current Outpatient Medications:   •  albuterol  sulfate  (90 Base) MCG/ACT inhaler, Inhale 2 puffs Every 4 (Four) Hours As Needed for Wheezing or Shortness of Air., Disp: 1 inhaler, Rfl: 0  •  amLODIPine (NORVASC) 5 MG tablet, Take 5 mg by mouth every night at bedtime., Disp: , Rfl:   •  aspirin 81 MG EC tablet, Take 1 tablet by mouth Daily., Disp: , Rfl:   •  atomoxetine (STRATTERA) 40 MG capsule, Take 40 mg by mouth Daily., Disp: , Rfl:   •  atorvastatin (LIPITOR) 80 MG tablet, Take 80 mg by mouth every night at bedtime., Disp: , Rfl:   •  cilostazol (PLETAL) 100 MG tablet, Take 100 mg by mouth 2 (Two) Times a Day., Disp: , Rfl:   •  clopidogrel (PLAVIX) 75 MG tablet, Take 1 tablet by mouth Daily., Disp: 30 tablet, Rfl: 5  •  diazePAM (VALIUM) 10 MG tablet, Take 10 mg by mouth 3 (Three) Times a Day As Needed., Disp: , Rfl:   •  fenofibrate (TRICOR) 54 MG tablet, Take 54 mg by mouth Daily., Disp: , Rfl:   •  FLUoxetine (PROzac) 10 MG capsule, Take 10 mg by mouth Daily., Disp: , Rfl:   •  metoprolol succinate XL (TOPROL-XL) 100 MG 24 hr tablet, Take 100 mg by mouth every night at bedtime., Disp: , Rfl:   •  SITagliptin (JANUVIA) 50 MG tablet, Take 50 mg by mouth Daily., Disp: , Rfl:   •  tamsulosin (FLOMAX) 0.4 MG capsule 24 hr capsule, Take 1 capsule by mouth every night at bedtime., Disp: , Rfl:   •  lisinopril (PRINIVIL,ZESTRIL) 40 MG tablet, Take 40 mg by mouth Daily., Disp: , Rfl:   Past Medical History:   Diagnosis Date   • Anxiety    • CHF (congestive heart failure) (HCC)     Acute   • Depression    • Hypertension      Social History     Socioeconomic History   • Marital status: Single   Tobacco Use   • Smoking status: Current Every Day Smoker     Packs/day: 1.00     Start date: 1975   • Smokeless tobacco: Never Used   • Tobacco comment: Pt states he has cut back to 14 cigarettes per day.   Vaping Use   • Vaping Use: Never used   Substance and Sexual Activity   • Alcohol use: Not Currently   • Drug use: Never   • Sexual activity: Defer       Review  of Systems   Constitutional: Negative for malaise/fatigue and weight gain.   HENT: Negative for nosebleeds.    Cardiovascular: Positive for dyspnea on exertion (unchanged). Negative for chest pain, irregular heartbeat, leg swelling, near-syncope, orthopnea, palpitations, paroxysmal nocturnal dyspnea and syncope.   Respiratory: Negative for chest tightness and shortness of breath.    Hematologic/Lymphatic: Bruises/bleeds easily.   Musculoskeletal: Positive for muscle cramps.        Bilateral leg pain  Hip pain improved     Genitourinary: Negative for hematuria.   Neurological: Negative for dizziness and weakness.   All other systems reviewed and are negative.         Objective:     Vitals reviewed.   Constitutional:       General: Not in acute distress.     Appearance: Normal appearance. Well-developed. Obese.   Eyes:      Pupils: Pupils are equal, round, and reactive to light.   HENT:      Head: Normocephalic and atraumatic.      Nose: Nose normal.   Neck:      Vascular: No carotid bruit.   Pulmonary:      Effort: Pulmonary effort is normal. No respiratory distress.      Breath sounds: Normal breath sounds. No wheezing. No rales.   Cardiovascular:      Normal rate. Regular rhythm.      Murmurs: There is no murmur.   Edema:     Peripheral edema absent.   Abdominal:      General: There is no distension.      Palpations: Abdomen is soft.   Musculoskeletal: Normal range of motion.      Cervical back: Normal range of motion and neck supple. Skin:     General: Skin is warm.      Findings: No erythema or rash.   Neurological:      General: No focal deficit present.      Mental Status: Alert and oriented to person, place, and time.   Psychiatric:         Attention and Perception: Attention normal.         Mood and Affect: Mood normal.         Speech: Speech normal.         Behavior: Behavior normal.         Thought Content: Thought content normal.         Judgment: Judgment normal.         /80   Pulse 91   Ht 167.6  "cm (66\")   Wt 90.7 kg (200 lb)   SpO2 95%   BMI 32.28 kg/m²     Procedures    Lab Review:     Nuclear stress 6/21/2022:  Interpretation Summary  · Myocardial perfusion imaging indicates a medium-sized infarct located in the basal to mid inferior wall with no significant ischemia noted.  · Left ventricular ejection fraction is normal. There is akinesis of the basal to mid inferior wall. (Calculated EF = 60%).  · Impressions are consistent with a low risk study.    Results for orders placed in visit on 06/14/22    Adult Transthoracic Echo Complete W/ Cont if Necessary Per Protocol    Interpretation Summary  · Left ventricular ejection fraction appears to be 56 - 60%. Left ventricular systolic function is normal.  · Left ventricular wall thickness is consistent with moderate concentric hypertrophy.  · The following left ventricular wall segments are akinetic: basal inferior.  · Left ventricular diastolic function is consistent with (grade I) impaired relaxation.  · Normal right ventricular cavity size and systolic function noted.  · There is no significant (greater than mild) valvular dysfunction.    Lab Results   Component Value Date    CHOL 162 06/13/2022    CHLPL 327 (H) 08/18/2014    TRIG 240 (H) 06/13/2022    HDL 28 (L) 06/13/2022    LDL 93 06/13/2022     I have personally reviewed labs, echo, nuclear stress test and past office notes prior to patients visit  Assessment:          Diagnosis Plan   1. Coronary artery disease of native artery of native heart with stable angina pectoris ([facility])     2. Ischemic cardiomyopathy     3. Essential hypertension     4. Dyslipidemia     5. PAD (peripheral artery disease) ([facility])     6. CKD (chronic kidney disease) stage 4, GFR 15-29 ml/min ([facility])     7. Tobacco abuse     8. Class 1 obesity due to excess calories with serious comorbidity and body mass index (BMI) of 32.0 to 32.9 in adult            Plan:       1. CAD: LHC from 8/2014 demonstrated 50% stenosis of mid LAD, and a "  of RCA. Nuclear stress test 6/21/2022 revealed medium-sized infarct located in the basal to mid inferior wall with no significant ischemia noted, low risk study. Continue aspirin. Atorvastatin and metoprolol    2. Ischemic cardiomyopathy: EF was 40-45% on echo from 8/17 2014.  Echo 6/21/2022 showed LVEF improved to 56-60%. Continue metoprolol and lisinopril.     3. Hypertension: elevated in office today; patient reports better control at other offices recently. Recommended to monitor routinely and notify office if >140/90.  Continue current medications    4.Hyperlipidemia: managed and followed by PCP. Lipid panel demonstrates poor control on 6/13/2022. Continue atorvastatin and fenofibrate. LDL goal <70.     5. Peripheral artery disease: Severe disease noted on DK and CTA from 5/2022. S/p left common femoral endarterectomy with bilateral iliac stenting on 7/13/2022. Continue to follow with vascular, Dr Vaz    6. CKD stage IV: patient is following with nephrology     7. tobacco abuse: Campbell Casas  reports that he has been smoking. He started smoking about 47 years ago. He has been smoking about 1.00 pack per day. He has never used smokeless tobacco.. I have educated him on the risk of diseases from using tobacco products such as cancer, COPD and heart disease. Patient has been working on decreasing cigarettes. I spent 3  minutes counseling the patient.    8. Obesity: BMI is >= 30 and <35. (Class 1 Obesity). The following options were offered after discussion;: exercise counseling/recommendations and nutrition counseling/recommendations    Patient is to return in 6 months or sooner if needed

## 2022-11-14 ENCOUNTER — TRANSCRIBE ORDERS (OUTPATIENT)
Dept: ADMINISTRATIVE | Facility: HOSPITAL | Age: 67
End: 2022-11-14

## 2022-11-14 DIAGNOSIS — Z87.891 HISTORY OF TOBACCO ABUSE: Primary | ICD-10-CM

## 2022-11-28 ENCOUNTER — HOSPITAL ENCOUNTER (OUTPATIENT)
Dept: CT IMAGING | Facility: HOSPITAL | Age: 67
Discharge: HOME | End: 2022-11-28

## 2022-12-01 ENCOUNTER — HOSPITAL ENCOUNTER (OUTPATIENT)
Dept: CT IMAGING | Facility: HOSPITAL | Age: 67
Discharge: HOME | End: 2022-12-01
Admitting: NURSE PRACTITIONER

## 2022-12-01 DIAGNOSIS — Z87.891 HISTORY OF TOBACCO ABUSE: ICD-10-CM

## 2022-12-01 PROCEDURE — 71271 CT THORAX LUNG CANCER SCR C-: CPT

## 2022-12-12 ENCOUNTER — TELEPHONE (OUTPATIENT)
Dept: VASCULAR SURGERY | Facility: CLINIC | Age: 67
End: 2022-12-12

## 2022-12-13 ENCOUNTER — OFFICE VISIT (OUTPATIENT)
Dept: VASCULAR SURGERY | Facility: CLINIC | Age: 67
End: 2022-12-13

## 2022-12-13 ENCOUNTER — HOSPITAL ENCOUNTER (OUTPATIENT)
Dept: ULTRASOUND IMAGING | Facility: HOSPITAL | Age: 67
Discharge: HOME | End: 2022-12-13

## 2022-12-13 VITALS
HEIGHT: 67 IN | WEIGHT: 191 LBS | BODY MASS INDEX: 29.98 KG/M2 | DIASTOLIC BLOOD PRESSURE: 78 MMHG | SYSTOLIC BLOOD PRESSURE: 162 MMHG

## 2022-12-13 DIAGNOSIS — I65.22 CAROTID OCCLUSION, LEFT: ICD-10-CM

## 2022-12-13 DIAGNOSIS — I74.09 AORTOILIAC OCCLUSIVE DISEASE: ICD-10-CM

## 2022-12-13 DIAGNOSIS — I10 ESSENTIAL HYPERTENSION: ICD-10-CM

## 2022-12-13 DIAGNOSIS — I73.9 PAD (PERIPHERAL ARTERY DISEASE): ICD-10-CM

## 2022-12-13 DIAGNOSIS — E78.2 MIXED HYPERLIPIDEMIA: ICD-10-CM

## 2022-12-13 DIAGNOSIS — I65.21 STENOSIS OF RIGHT CAROTID ARTERY: Primary | ICD-10-CM

## 2022-12-13 DIAGNOSIS — I65.23 BILATERAL CAROTID ARTERY STENOSIS: ICD-10-CM

## 2022-12-13 DIAGNOSIS — Z72.0 TOBACCO ABUSE: ICD-10-CM

## 2022-12-13 PROCEDURE — 93880 EXTRACRANIAL BILAT STUDY: CPT

## 2022-12-13 PROCEDURE — 93880 EXTRACRANIAL BILAT STUDY: CPT | Performed by: SURGERY

## 2022-12-13 PROCEDURE — 99214 OFFICE O/P EST MOD 30 MIN: CPT | Performed by: SURGERY

## 2022-12-13 PROCEDURE — 93923 UPR/LXTR ART STDY 3+ LVLS: CPT

## 2022-12-13 PROCEDURE — 93923 UPR/LXTR ART STDY 3+ LVLS: CPT | Performed by: SURGERY

## 2022-12-13 NOTE — PROGRESS NOTES
"12/13/2022    Yaa Fox, APRN  [address] [address]        Campbell Casas  1955    Chief Complaint   Patient presents with   • Follow-up     6 month f/u with carotids and ABIs.  Last seen in the office on 8/1/22. Pt says that his legs are hurting a little more.  Pt denies any stroke like symptoms.  Unable to obtain pulse ox reading.       Dear Yaa Fox, APRN:    HPI     I had the pleasure of seeing you patient in the office today for follow up.  As you recall, the patient is a 67 y.o. male who we are currently following for routine health maintenance.  He was having complaints of pain to his legs for the past couple of years.  He really describes bilateral hip and buttock pain and reports they lock up on him.  He did undergo an aortoiliac angiogram with intravascular lithotripsy of the right common femoral and proximal small superficial femoral arteries with lithotripsy of the right and left external iliac arteries.  He also underwent a left common femoral endarterectomy on 7/13/2022.  Currently, he appears to be doing quite well with no significant claudication symptoms in the lower extremities.  He had noninvasive testing performed today which I personally reviewed.      Review of Systems   Constitutional: Negative.    HENT: Negative.    Eyes: Negative.    Respiratory: Negative.    Cardiovascular: Negative.         Efra hip/buttocks pain   Gastrointestinal: Negative.    Endocrine: Negative.    Genitourinary: Negative.    Musculoskeletal: Negative.    Skin: Negative.    Allergic/Immunologic: Negative.    Neurological: Negative.    Hematological: Negative.    Psychiatric/Behavioral: Negative.    All other systems reviewed and are negative.      /78   Ht 170.2 cm (67\")   Wt 86.6 kg (191 lb)   BMI 29.91 kg/m²   Physical Exam  Vitals and nursing note reviewed.   Constitutional:       Appearance: He is well-developed.   HENT:      Head: Normocephalic and atraumatic.   Eyes:      " General: No scleral icterus.     Pupils: Pupils are equal, round, and reactive to light.   Neck:      Thyroid: No thyromegaly.   Cardiovascular:      Rate and Rhythm: Normal rate and regular rhythm.      Pulses:           Femoral pulses are 2+ on the right side and 2+ on the left side.       Dorsalis pedis pulses are detected w/ Doppler on the right side and detected w/ Doppler on the left side.        Posterior tibial pulses are detected w/ Doppler on the right side and detected w/ Doppler on the left side.      Heart sounds: Normal heart sounds.   Pulmonary:      Effort: Pulmonary effort is normal.      Breath sounds: Normal breath sounds.   Abdominal:      General: Bowel sounds are normal.      Palpations: Abdomen is soft.   Musculoskeletal:         General: Normal range of motion.      Cervical back: Normal range of motion and neck supple.   Skin:     General: Skin is warm and dry.   Neurological:      Mental Status: He is alert and oriented to person, place, and time.   Psychiatric:         Behavior: Behavior normal.         Thought Content: Thought content normal.         Judgment: Judgment normal.         DIAGNOSTIC DATA:    CT Chest Low Dose Cancer Screening WO    Result Date: 12/1/2022  Narrative: EXAM/TECHNIQUE: CT chest without contrast, low-dose protocol  INDICATION: z87.891; Z87.891-Personal history of nicotine dependence  COMPARISON: 11/18/2021  DLP: 59 mGy cm. Automated exposure control was also utilized to decrease patient radiation dose.  FINDINGS:  The central airways are clear. No consolidation or pleural effusion. Mild centrilobular emphysema. Unchanged 3 mm RIGHT middle lobe pulmonary nodule on image 93.  No enlarged lymph nodes in the chest. The main pulmonary artery is nondilated. Thoracic aorta is normal in caliber and contains atherosclerotic calcification. No pericardial effusion. Heavy coronary artery atherosclerotic calcification.  No acute soft tissue finding. Upper abdomen appears  unremarkable. No suspicious osseous finding.      Impression:  1.  No new or enlarging pulmonary nodule.  Lung-RADS 2: Benign Nodules with a very low likelihood of becoming a clinically active cancer due to size or lack of growth. Continue annual screening with low dose CT in 12 months. This report was finalized on 12/01/2022 15:30 by Dr. Keshav Hendrix MD.         Carotid duplex shows 70 to 99% carotid occlusive disease on the right and a possible occlusion in the left internal carotid artery.      Patient Active Problem List   Diagnosis   • Tobacco abuse   • Essential hypertension   • Other hyperlipidemia   • PAD (peripheral artery disease) ([facility])   • Aortoiliac occlusive disease ([facility])   • Preop testing   • CKD (chronic kidney disease) stage 4, GFR 15-29 ml/min ([facility])   • Coronary artery disease of native artery of native heart with stable angina pectoris ([facility])   • Ischemic cardiomyopathy   • Class 1 obesity due to excess calories with serious comorbidity and body mass index (BMI) of 32.0 to 32.9 in adult   • Carotid occlusion, left   • Stenosis of right carotid artery         ICD-10-CM ICD-9-CM   1. Stenosis of right carotid artery  I65.21 433.10   2. Essential hypertension  I10 401.9   3. Mixed hyperlipidemia  E78.2 272.2   4. PAD (peripheral artery disease) ([facility])  I73.9 443.9   5. Aortoiliac occlusive disease ([facility])  I74.09 444.09   6. Tobacco abuse  Z72.0 305.1   7. Carotid occlusion, left  I65.22 433.10           PLAN: After thoroughly evaluating Campbell WILL Casas, I believe the best course of action is to proceed with an MRA time-of-flight of the carotid arteries to better evaluate his carotid occlusive disease.  He has significant stenosis on the right and a possible occlusion on the left.  Luckily he is remained asymptomatic with no stroke symptoms.  With regards to his lower extremities, they remain significantly improved.  He denies any significant arterial claudication.  I will see him back in the next 2 to 3  weeks with this testing to see if any further treatment is necessary regarding his carotid occlusive disease.  I did discuss vascular risk factors as they pertain to the progression of vascular disease including controlling his hypertension and hyperlipidemia.  His blood pressure stable on his current medications.  He is maintained on Lipitor and Tricor for his cholesterol.  We discussed tobacco abuse cessation for over 3 minutes letting him know the continued risk of worsening vascular disease.  He is not willing to quit at this time.  The patient is to continue taking their medications as previously discussed.   This was all discussed in full with complete understanding.  Thank you for allowing me to participate in the care of your patient.  Please do not hesitate to call with any questions or concerns.  We will keep you aware of any further encounters with Campbell Casas.      Sincerely Yours,      Esequiel Vaz, DO         none

## 2022-12-20 ENCOUNTER — APPOINTMENT (OUTPATIENT)
Dept: ULTRASOUND IMAGING | Facility: HOSPITAL | Age: 67
End: 2022-12-20

## 2022-12-28 ENCOUNTER — HOSPITAL ENCOUNTER (OUTPATIENT)
Dept: MRI IMAGING | Facility: HOSPITAL | Age: 67
Discharge: HOME | End: 2022-12-28
Admitting: SURGERY

## 2022-12-28 DIAGNOSIS — I65.21 STENOSIS OF RIGHT CAROTID ARTERY: ICD-10-CM

## 2022-12-28 DIAGNOSIS — I65.22 CAROTID OCCLUSION, LEFT: ICD-10-CM

## 2022-12-28 PROCEDURE — 70547 MR ANGIOGRAPHY NECK W/O DYE: CPT

## 2022-12-30 ENCOUNTER — TELEPHONE (OUTPATIENT)
Dept: VASCULAR SURGERY | Facility: CLINIC | Age: 67
End: 2022-12-30

## 2023-01-03 ENCOUNTER — OFFICE VISIT (OUTPATIENT)
Dept: VASCULAR SURGERY | Facility: CLINIC | Age: 68
End: 2023-01-03
Payer: MEDICARE

## 2023-01-03 VITALS
HEART RATE: 84 BPM | BODY MASS INDEX: 30.07 KG/M2 | SYSTOLIC BLOOD PRESSURE: 130 MMHG | WEIGHT: 191.6 LBS | DIASTOLIC BLOOD PRESSURE: 78 MMHG | HEIGHT: 67 IN | OXYGEN SATURATION: 97 %

## 2023-01-03 DIAGNOSIS — E78.2 MIXED HYPERLIPIDEMIA: ICD-10-CM

## 2023-01-03 DIAGNOSIS — I74.09 AORTOILIAC OCCLUSIVE DISEASE: ICD-10-CM

## 2023-01-03 DIAGNOSIS — I65.22 CAROTID OCCLUSION, LEFT: ICD-10-CM

## 2023-01-03 DIAGNOSIS — I65.21 STENOSIS OF RIGHT CAROTID ARTERY: Primary | ICD-10-CM

## 2023-01-03 DIAGNOSIS — I73.9 PAD (PERIPHERAL ARTERY DISEASE): ICD-10-CM

## 2023-01-03 DIAGNOSIS — I10 ESSENTIAL HYPERTENSION: ICD-10-CM

## 2023-01-03 DIAGNOSIS — Z72.0 TOBACCO ABUSE: ICD-10-CM

## 2023-01-03 PROCEDURE — 1160F RVW MEDS BY RX/DR IN RCRD: CPT | Performed by: SURGERY

## 2023-01-03 PROCEDURE — 1159F MED LIST DOCD IN RCRD: CPT | Performed by: SURGERY

## 2023-01-03 PROCEDURE — 99214 OFFICE O/P EST MOD 30 MIN: CPT | Performed by: SURGERY

## 2023-01-03 NOTE — LETTER
January 3, 2023     CARYN Chaves  [address] [address]    Patient: Campbell Casas   YOB: 1955   Date of Visit: 1/3/2023       Dear CARYN Guerrero:    Thank you for referring Campbell Casas to me for evaluation. Below are the relevant portions of my assessment and plan of care.    If you have questions, please do not hesitate to call me. I look forward to following Campbell along with you.         Sincerely,        Esequiel Vaz DO        CC: No Recipients  Esequiel Vaz DO  01/03/23 1234  Signed  1/3/2023    Yaa Fox APRN  [address] [address]        Campbell Casas  1955    Chief Complaint   Patient presents with   • Follow-up     2 wk f/u with MRA of the neck. Last seen 12/13/22. Testing done 12/22/22. Patient denies any stroke symptoms. In addition, patient still complains of pain in legs.        Dear Yaa Fox APRN:    HPI     I had the pleasure of seeing you patient in the office today for follow up.  As you recall, the patient is a 67 y.o. male who we are currently following for routine health maintenance.  He was having complaints of pain to his legs for the past couple of years.  He really describes bilateral hip and buttock pain and reports they lock up on him.  He did undergo an aortoiliac angiogram with intravascular lithotripsy of the right common femoral and proximal small superficial femoral arteries with lithotripsy of the right and left external iliac arteries.  He also underwent a left common femoral endarterectomy on 7/13/2022.  Currently he is doing well denies any strokelike symptoms.  He also denies any changes to his lower extremities.  He did have an injection for his back which has significantly improved his pain.  He did have noninvasive testing including a MRA neck performed today,w [facility] I did review in office.      Review of Systems   Constitutional: Negative.    HENT: Negative.    Eyes: Negative.    Respiratory:  Negative.    Cardiovascular: Negative.         Efra hip/buttock pain   Gastrointestinal: Negative.    Endocrine: Negative.    Genitourinary: Negative.    Musculoskeletal: Negative.    Skin: Negative.    Allergic/Immunologic: Negative.    Neurological: Negative.    Hematological: Negative.    Psychiatric/Behavioral: Negative.    All other systems reviewed and are negative.       /78   Pulse 84   Ht 170.2 cm (67\")   Wt 86.9 kg (191 lb 9.6 oz)   SpO2 97%   BMI 30.01 kg/m²   Physical Exam  Vitals and nursing note reviewed.   Constitutional:       Appearance: He is well-developed.   HENT:      Head: Normocephalic and atraumatic.   Eyes:      General: No scleral icterus.     Pupils: Pupils are equal, round, and reactive to light.   Neck:      Thyroid: No thyromegaly.      Vascular: No carotid bruit or JVD.   Cardiovascular:      Rate and Rhythm: Normal rate and regular rhythm.      Pulses:           Carotid pulses are 2+ on the right side and 2+ on the left side.       Femoral pulses are 2+ on the right side and 2+ on the left side.       Popliteal pulses are 2+ on the right side and 2+ on the left side.        Dorsalis pedis pulses are detected w/ Doppler on the right side and detected w/ Doppler on the left side.        Posterior tibial pulses are detected w/ Doppler on the right side and detected w/ Doppler on the left side.      Heart sounds: Normal heart sounds.   Pulmonary:      Effort: Pulmonary effort is normal.      Breath sounds: Normal breath sounds.   Abdominal:      General: Bowel sounds are normal. There is no distension or abdominal bruit.      Palpations: Abdomen is soft. There is no mass.      Tenderness: There is no abdominal tenderness.   Musculoskeletal:         General: Normal range of motion.      Cervical back: Neck supple.   Lymphadenopathy:      Cervical: No cervical adenopathy.   Skin:     General: Skin is warm and dry.   Neurological:      Mental Status: He is alert and oriented to  person, place, and time.      Cranial Nerves: No cranial nerve deficit.      Sensory: No sensory deficit.   Psychiatric:         Mood and Affect: Mood normal.         Behavior: Behavior normal.         Thought Content: Thought content normal.         Judgment: Judgment normal.        DIAGNOSTIC DATA:    MRI Angiogram Neck Without Contrast    Result Date: 1/1/2023  Narrative: EXAMINATION: MRI ANGIOGRAM NECK WO CONTRAST- 1/1/2023 12:11 PM CST  HISTORY: Carotid artery stenosis; I65.21-Occlusion and stenosis of right carotid artery; I65.22-Occlusion and stenosis of left carotid artery  REPORT: Multiplanar, 3-D time-of-flight MR imaging of the neck was performed to evaluate the carotid and vertebral arteries, the examination is done without intravenous contrast.  COMPARISON: Ultrasound of the carotid arteries 12/13/2022.  On the right, there is patency of the common carotid artery as well as the internal and external carotid arteries, there is a loop of the right ICA approximately 3 cm distal to its origin. There is stenosis at the origin of the right ICA, this appears to be in the range of 50%. CTA would be more definite in determining the extent of stenosis. The right external carotid artery is patent.  On the left, there is occlusion of the left ICA just beyond its origin. The left common carotid artery is patent, the left external carotid artery is patent. Both vertebral arteries are patent, left vertebral artery is dominant compared to the right.      Impression: 1. Occlusion of the left ICA at its origin. 2. Stenosis at the origin of the right ICA does not appear flow limiting. CTA would be more definitive in determining the exact percentage of stenosis. 2. Patency of bilateral vertebral arteries, the left vertebral artery is dominant compared to the right. This report was finalized on 01/01/2023 12:16 by Dr. Luis F Ramirez MD.    US Carotid Bilateral    Result Date: 12/14/2022  Narrative: History: Carotid  occlusive disease      Impression: Impression: 1. There is 70-99% stenosis of the right internal carotid artery. 2. There is complete occlusion of the left internal carotid artery. 3. Antegrade flow is demonstrated in bilateral vertebral arteries. 4. Further imaging with MRA time-of-flight of the neck may be warranted.  Comments: Bilateral carotid vertebral arterial duplex scan was performed.  Grayscale imaging shows intimal thickening and calcified elements at the carotid bifurcation. The right internal carotid artery peak systolic velocity is 308 cm/sec. The end-diastolic velocity is 84.1 cm/sec. The right ICA/CCA ratio is approximately 3.7 . These findings correlate with 70-99% stenosis of the right internal carotid artery.  There appears to be complete occlusion of the left internal carotid artery.  Antegrade flow is demonstrated in bilateral vertebral arteries. There is greater than 50% stenosis in bilateral external carotid arteries. This report was finalized on 12/14/2022 15:58 by Dr. Esequiel Vaz MD.    US Ankle / Brachial Indices Extremity Complete    Result Date: 12/13/2022  Narrative:  History: PAD  Comments: Bilateral lower extremity arterial with multi-level pulse volume recordings and segmental pressures were performed at rest and stress.  The right ankle/brachial index is 0.85. The waveforms are biphasic without dampening.These findings are consistent with mild arterial insufficiency of the right lower extremity at rest.  The left ankle/brachial index is 0.85. The waveforms are biphasic without dampening. These findings are consistent with mild arterial insufficiency of the left lower extremity at rest.      Impression: Impression: 1. Mild arterial insufficiency of the right lower extremity at rest. 2. Mild arterial insufficiency of the left lower extremity at rest.   This report was finalized on 12/13/2022 15:50 by Dr. Esequiel Vaz MD.      Patient Active Problem List   Diagnosis   • Tobacco  abuse   • Essential hypertension   • Other hyperlipidemia   • PAD (peripheral artery disease) ([facility])   • Aortoiliac occlusive disease ([facility])   • Preop testing   • CKD (chronic kidney disease) stage 4, GFR 15-29 ml/min ([facility])   • Coronary artery disease of native artery of native heart with stable angina pectoris ([facility])   • Ischemic cardiomyopathy   • Class 1 obesity due to excess calories with serious comorbidity and body mass index (BMI) of 32.0 to 32.9 in adult   • Carotid occlusion, left   • Stenosis of right carotid artery         ICD-10-CM ICD-9-CM   1. Stenosis of right carotid artery  I65.21 433.10   2. PAD (peripheral artery disease) ([facility])  I73.9 443.9   3. Aortoiliac occlusive disease ([facility])  I74.09 444.09   4. Carotid occlusion, left  I65.22 433.10   5. Tobacco abuse  Z72.0 305.1   6. Essential hypertension  I10 401.9   7. Mixed hyperlipidemia  E78.2 272.2           PLAN: After thoroughly evaluating Campbell Casas, I believe the best course of action is to remain conservative from vascular surgery standpoint.  I did review his testing which does show complete left carotid occlusion and right carotid stenosis, however requires no intervention at this time.  I will see him back in 6 months with repeat noninvasive testing including a carotid duplex and ABIs.  His lower extremities remain significantly improved per his report.  I did discuss vascular risk factors as they pertain to the progression of vascular disease including controlling his hypertension and hyperlipidemia.  These risk factors are currently stable.  We discussed tobacco abuse cessation for over 3 minutes letting him know the continued risk of worsening vascular disease.  He is not willing to quit at this time.  The patient is to continue taking their medications as previously discussed.   This was all discussed in full with complete understanding.  Thank you for allowing me to participate in the care of your patient.  Please do not hesitate to  call with any questions or concerns.  We will keep you aware of any further encounters with Campbell Casas.      Sincerely Yours,      Esequiel Vaz, DO

## 2023-01-03 NOTE — PROGRESS NOTES
1/3/2023    Yaa Fox, APRN  [address] [address]        Campbell Casas  1955    Chief Complaint   Patient presents with   • Follow-up     2 wk f/u with MRA of the neck. Last seen 12/13/22. Testing done 12/22/22. Patient denies any stroke symptoms. In addition, patient still complains of pain in legs.        Dear Yaa Fox, APRN:    HPI     I had the pleasure of seeing you patient in the office today for follow up.  As you recall, the patient is a 67 y.o. male who we are currently following for routine health maintenance.  He was having complaints of pain to his legs for the past couple of years.  He really describes bilateral hip and buttock pain and reports they lock up on him.  He did undergo an aortoiliac angiogram with intravascular lithotripsy of the right common femoral and proximal small superficial femoral arteries with lithotripsy of the right and left external iliac arteries.  He also underwent a left common femoral endarterectomy on 7/13/2022.  Currently he is doing well denies any strokelike symptoms.  He also denies any changes to his lower extremities.  He did have an injection for his back which has significantly improved his pain.  He did have noninvasive testing including a MRA neck performed today,w [facility] I did review in office.      Review of Systems   Constitutional: Negative.    HENT: Negative.    Eyes: Negative.    Respiratory: Negative.    Cardiovascular: Negative.         Efra hip/buttock pain   Gastrointestinal: Negative.    Endocrine: Negative.    Genitourinary: Negative.    Musculoskeletal: Negative.    Skin: Negative.    Allergic/Immunologic: Negative.    Neurological: Negative.    Hematological: Negative.    Psychiatric/Behavioral: Negative.    All other systems reviewed and are negative.       /78   Pulse 84   Ht 170.2 cm (67\")   Wt 86.9 kg (191 lb 9.6 oz)   SpO2 97%   BMI 30.01 kg/m²   Physical Exam  Vitals and nursing note reviewed.    Constitutional:       Appearance: He is well-developed.   HENT:      Head: Normocephalic and atraumatic.   Eyes:      General: No scleral icterus.     Pupils: Pupils are equal, round, and reactive to light.   Neck:      Thyroid: No thyromegaly.      Vascular: No carotid bruit or JVD.   Cardiovascular:      Rate and Rhythm: Normal rate and regular rhythm.      Pulses:           Carotid pulses are 2+ on the right side and 2+ on the left side.       Femoral pulses are 2+ on the right side and 2+ on the left side.       Popliteal pulses are 2+ on the right side and 2+ on the left side.        Dorsalis pedis pulses are detected w/ Doppler on the right side and detected w/ Doppler on the left side.        Posterior tibial pulses are detected w/ Doppler on the right side and detected w/ Doppler on the left side.      Heart sounds: Normal heart sounds.   Pulmonary:      Effort: Pulmonary effort is normal.      Breath sounds: Normal breath sounds.   Abdominal:      General: Bowel sounds are normal. There is no distension or abdominal bruit.      Palpations: Abdomen is soft. There is no mass.      Tenderness: There is no abdominal tenderness.   Musculoskeletal:         General: Normal range of motion.      Cervical back: Neck supple.   Lymphadenopathy:      Cervical: No cervical adenopathy.   Skin:     General: Skin is warm and dry.   Neurological:      Mental Status: He is alert and oriented to person, place, and time.      Cranial Nerves: No cranial nerve deficit.      Sensory: No sensory deficit.   Psychiatric:         Mood and Affect: Mood normal.         Behavior: Behavior normal.         Thought Content: Thought content normal.         Judgment: Judgment normal.        DIAGNOSTIC DATA:    MRI Angiogram Neck Without Contrast    Result Date: 1/1/2023  Narrative: EXAMINATION: MRI ANGIOGRAM NECK WO CONTRAST- 1/1/2023 12:11 PM CST  HISTORY: Carotid artery stenosis; I65.21-Occlusion and stenosis of right carotid artery;  I65.22-Occlusion and stenosis of left carotid artery  REPORT: Multiplanar, 3-D time-of-flight MR imaging of the neck was performed to evaluate the carotid and vertebral arteries, the examination is done without intravenous contrast.  COMPARISON: Ultrasound of the carotid arteries 12/13/2022.  On the right, there is patency of the common carotid artery as well as the internal and external carotid arteries, there is a loop of the right ICA approximately 3 cm distal to its origin. There is stenosis at the origin of the right ICA, this appears to be in the range of 50%. CTA would be more definite in determining the extent of stenosis. The right external carotid artery is patent.  On the left, there is occlusion of the left ICA just beyond its origin. The left common carotid artery is patent, the left external carotid artery is patent. Both vertebral arteries are patent, left vertebral artery is dominant compared to the right.      Impression: 1. Occlusion of the left ICA at its origin. 2. Stenosis at the origin of the right ICA does not appear flow limiting. CTA would be more definitive in determining the exact percentage of stenosis. 2. Patency of bilateral vertebral arteries, the left vertebral artery is dominant compared to the right. This report was finalized on 01/01/2023 12:16 by Dr. Luis F Ramirez MD.    US Carotid Bilateral    Result Date: 12/14/2022  Narrative: History: Carotid occlusive disease      Impression: Impression: 1. There is 70-99% stenosis of the right internal carotid artery. 2. There is complete occlusion of the left internal carotid artery. 3. Antegrade flow is demonstrated in bilateral vertebral arteries. 4. Further imaging with MRA time-of-flight of the neck may be warranted.  Comments: Bilateral carotid vertebral arterial duplex scan was performed.  Grayscale imaging shows intimal thickening and calcified elements at the carotid bifurcation. The right internal carotid artery peak systolic  velocity is 308 cm/sec. The end-diastolic velocity is 84.1 cm/sec. The right ICA/CCA ratio is approximately 3.7 . These findings correlate with 70-99% stenosis of the right internal carotid artery.  There appears to be complete occlusion of the left internal carotid artery.  Antegrade flow is demonstrated in bilateral vertebral arteries. There is greater than 50% stenosis in bilateral external carotid arteries. This report was finalized on 12/14/2022 15:58 by Dr. Esequiel Vaz MD.    US Ankle / Brachial Indices Extremity Complete    Result Date: 12/13/2022  Narrative:  History: PAD  Comments: Bilateral lower extremity arterial with multi-level pulse volume recordings and segmental pressures were performed at rest and stress.  The right ankle/brachial index is 0.85. The waveforms are biphasic without dampening.These findings are consistent with mild arterial insufficiency of the right lower extremity at rest.  The left ankle/brachial index is 0.85. The waveforms are biphasic without dampening. These findings are consistent with mild arterial insufficiency of the left lower extremity at rest.      Impression: Impression: 1. Mild arterial insufficiency of the right lower extremity at rest. 2. Mild arterial insufficiency of the left lower extremity at rest.   This report was finalized on 12/13/2022 15:50 by Dr. Esequiel Vza MD.      Patient Active Problem List   Diagnosis   • Tobacco abuse   • Essential hypertension   • Other hyperlipidemia   • PAD (peripheral artery disease) ([facility])   • Aortoiliac occlusive disease ([facility])   • Preop testing   • CKD (chronic kidney disease) stage 4, GFR 15-29 ml/min ([facility])   • Coronary artery disease of native artery of native heart with stable angina pectoris ([facility])   • Ischemic cardiomyopathy   • Class 1 obesity due to excess calories with serious comorbidity and body mass index (BMI) of 32.0 to 32.9 in adult   • Carotid occlusion, left   • Stenosis of right carotid artery          ICD-10-CM ICD-9-CM   1. Stenosis of right carotid artery  I65.21 433.10   2. PAD (peripheral artery disease) ([facility])  I73.9 443.9   3. Aortoiliac occlusive disease ([facility])  I74.09 444.09   4. Carotid occlusion, left  I65.22 433.10   5. Tobacco abuse  Z72.0 305.1   6. Essential hypertension  I10 401.9   7. Mixed hyperlipidemia  E78.2 272.2           PLAN: After thoroughly evaluating Campbell Casas, I believe the best course of action is to remain conservative from vascular surgery standpoint.  I did review his testing which does show complete left carotid occlusion and right carotid stenosis, however requires no intervention at this time.  I will see him back in 6 months with repeat noninvasive testing including a carotid duplex and ABIs.  His lower extremities remain significantly improved per his report.  I did discuss vascular risk factors as they pertain to the progression of vascular disease including controlling his hypertension and hyperlipidemia.  These risk factors are currently stable.  We discussed tobacco abuse cessation for over 3 minutes letting him know the continued risk of worsening vascular disease.  He is not willing to quit at this time.  The patient is to continue taking their medications as previously discussed.   This was all discussed in full with complete understanding.  Thank you for allowing me to participate in the care of your patient.  Please do not hesitate to call with any questions or concerns.  We will keep you aware of any further encounters with Campbell Casas.      Sincerely Yours,      Esequiel Vaz, DO

## 2023-01-06 RX ORDER — CLOPIDOGREL BISULFATE 75 MG/1
75 TABLET ORAL DAILY
Qty: 30 TABLET | Refills: 5 | Status: SHIPPED | OUTPATIENT
Start: 2023-01-06

## 2023-01-31 ENCOUNTER — APPOINTMENT (OUTPATIENT)
Dept: ULTRASOUND IMAGING | Facility: HOSPITAL | Age: 68
End: 2023-01-31

## 2023-02-28 ENCOUNTER — OFFICE VISIT (OUTPATIENT)
Dept: CARDIOLOGY | Facility: CLINIC | Age: 68
End: 2023-02-28
Payer: MEDICARE

## 2023-02-28 VITALS
OXYGEN SATURATION: 97 % | BODY MASS INDEX: 29.19 KG/M2 | HEART RATE: 67 BPM | SYSTOLIC BLOOD PRESSURE: 156 MMHG | WEIGHT: 186 LBS | HEIGHT: 67 IN | DIASTOLIC BLOOD PRESSURE: 90 MMHG

## 2023-02-28 DIAGNOSIS — I10 ESSENTIAL HYPERTENSION: ICD-10-CM

## 2023-02-28 DIAGNOSIS — N18.4 CKD (CHRONIC KIDNEY DISEASE) STAGE 4, GFR 15-29 ML/MIN: ICD-10-CM

## 2023-02-28 DIAGNOSIS — I25.5 ISCHEMIC CARDIOMYOPATHY: ICD-10-CM

## 2023-02-28 DIAGNOSIS — E66.3 OVERWEIGHT WITH BODY MASS INDEX (BMI) OF 29 TO 29.9 IN ADULT: ICD-10-CM

## 2023-02-28 DIAGNOSIS — I73.9 PAD (PERIPHERAL ARTERY DISEASE): ICD-10-CM

## 2023-02-28 DIAGNOSIS — I25.118 CORONARY ARTERY DISEASE OF NATIVE ARTERY OF NATIVE HEART WITH STABLE ANGINA PECTORIS: Primary | ICD-10-CM

## 2023-02-28 DIAGNOSIS — Z72.0 TOBACCO ABUSE: ICD-10-CM

## 2023-02-28 DIAGNOSIS — I65.21 CAROTID STENOSIS, ASYMPTOMATIC, RIGHT: ICD-10-CM

## 2023-02-28 DIAGNOSIS — E78.5 DYSLIPIDEMIA: ICD-10-CM

## 2023-02-28 PROCEDURE — 99214 OFFICE O/P EST MOD 30 MIN: CPT | Performed by: NURSE PRACTITIONER

## 2023-02-28 PROCEDURE — 93000 ELECTROCARDIOGRAM COMPLETE: CPT | Performed by: NURSE PRACTITIONER

## 2023-02-28 RX ORDER — OXYBUTYNIN CHLORIDE 5 MG/1
5 TABLET ORAL 3 TIMES DAILY
COMMUNITY

## 2023-02-28 RX ORDER — AMLODIPINE BESYLATE 10 MG/1
10 TABLET ORAL
Qty: 30 TABLET | Refills: 11 | Status: SHIPPED | OUTPATIENT
Start: 2023-02-28

## 2023-02-28 NOTE — PROGRESS NOTES
Subjective:     Encounter Date: 02/28/2023      Patient ID: Campbell Casas is a 68 y.o. male with coronary artery disease, ischemic cardiomyopathy, peripheral artery disease, hypertension, dyslipidemia, CKD IV and tobacco abuse.     Chief Complaint: 6 month follow up   Coronary Artery Disease  Presents for follow-up visit. Pertinent negatives include no chest pain, chest pressure, chest tightness, dizziness, leg swelling, palpitations, shortness of breath or weight gain. His past medical history is significant for CHF. The symptoms have been stable. Compliance with diet is variable. Compliance with exercise is variable. Compliance with medications is good.   Hypertension  This is a chronic problem. The current episode started more than 1 year ago. The problem has been waxing and waning since onset. The problem is uncontrolled. Pertinent negatives include no chest pain, malaise/fatigue, orthopnea, palpitations, peripheral edema, PND or shortness of breath. Risk factors for coronary artery disease include dyslipidemia and smoking/tobacco exposure. Current antihypertension treatment includes calcium channel blockers, ACE inhibitors and beta blockers. Hypertensive end-organ damage includes CAD/MI.   Congestive Heart Failure  Presents for follow-up visit. Associated symptoms include fatigue (chronic and unchanged). Pertinent negatives include no chest pain, chest pressure, edema, near-syncope, orthopnea, palpitations, paroxysmal nocturnal dyspnea or shortness of breath. The symptoms have been stable. His past medical history is significant for CAD. Compliance with total regimen is 51-75%. Compliance with diet is 26-50%. Compliance with exercise is 26-50%. Compliance with medications is 51-75%.     Patient presents today for management of coronary artery disease. He reports that he has been doing well. He reports fatigue often. He denies any chest pain. He denies any dyspnea on exertion. He denies any leg swelling,  orthopnea or PND. He denies any heart racing or palpitations. He denies monitoring his blood pressure at home; he reports that it is commonly elevated at dr offices. He denies any dizziness or lightheadedness. He continues to follow up with  Dr Vaz and reports improvement in DK's and claudication has improved. Carotid US 12/13/2022 showed 70-99% stenosis of right ICA and complete occlusion of left ICA. Will follow up in 6 months with Dr Vaz.  He follows with Yaa RUBIN as PCP.     Previous Cardiac Testing and Procedures:  -Echo (8/17/2014) EF 40-45%, abnormal diastolic function, normal RV size and function, normal atria, mild TR and MR  -DSE (8/18/2014) possible old basal MI with akiko-infarct ischemia  -LHC (8/19/2014) 50% mid LAD,  of mid RCA with filling via left to right collaterals, EF 40%  -proBNP (7/7/2019) 793, normal 0-900  -DK (5/18/2022) moderate arterial insufficiency of the right lower extremity at rest, severe arterial insufficiency of the left lower extremity at rest  -CTA of the abdomen (5/31/2022) severe atheromatous changes of the abdominal aorta, partial luminal intramural thrombosis of the distal abdominal aorta with less than 50% stenosis, long segment of noncalcified atheromatous plaque along the proximal to mid right SFA with up to 70% stenosis significant high-grade stenosis of the proximal right SFA  -BMP (6/13/2022) creatinine 2.48, GFR 28, BUN 46, potassium 4.2, sodium 138  -Lipid panel (6/13/2022): total cholesterol 162, HDL 28, LDL 93, triglycerides 240  -Echo (6/21/2022):LVEF 56-60%, moderate LVH, grade I diastolic dysfunction and no significant valvular disease.  -Nuclear stress test (6/21/2022):  low risk for ischemia  -DK (12/13/2022): Mild arterial insufficiency of bilateral lower extremities at rest     The following portions of the patient's history were reviewed and updated as appropriate: allergies, current medications, past family history, past medical  history, past social history, past surgical history and problem list.    No Known Allergies    Current Outpatient Medications:   •  albuterol sulfate  (90 Base) MCG/ACT inhaler, Inhale 2 puffs Every 4 (Four) Hours As Needed for Wheezing or Shortness of Air., Disp: 1 inhaler, Rfl: 0  •  amLODIPine (NORVASC) 10 MG tablet, Take 1 tablet by mouth every night at bedtime., Disp: 30 tablet, Rfl: 11  •  atomoxetine (STRATTERA) 40 MG capsule, Take 1 capsule by mouth Daily., Disp: , Rfl:   •  atorvastatin (LIPITOR) 80 MG tablet, Take 1 tablet by mouth every night at bedtime., Disp: , Rfl:   •  cilostazol (PLETAL) 100 MG tablet, Take 1 tablet by mouth 2 (Two) Times a Day., Disp: , Rfl:   •  clopidogrel (PLAVIX) 75 MG tablet, TAKE 1 TABLET BY MOUTH DAILY., Disp: 30 tablet, Rfl: 5  •  diazePAM (VALIUM) 10 MG tablet, Take 1 tablet by mouth 3 (Three) Times a Day As Needed., Disp: , Rfl:   •  fenofibrate (TRICOR) 54 MG tablet, Take 1 tablet by mouth Daily., Disp: , Rfl:   •  FLUoxetine (PROzac) 10 MG capsule, Take 1 capsule by mouth Daily., Disp: , Rfl:   •  lisinopril (PRINIVIL,ZESTRIL) 40 MG tablet, Take 1 tablet by mouth Daily., Disp: , Rfl:   •  metoprolol succinate XL (TOPROL-XL) 100 MG 24 hr tablet, Take 1 tablet by mouth every night at bedtime., Disp: , Rfl:   •  oxybutynin (DITROPAN) 5 MG tablet, Take 1 tablet by mouth 3 (Three) Times a Day., Disp: , Rfl:   •  SITagliptin (JANUVIA) 50 MG tablet, Take 1 tablet by mouth Daily., Disp: , Rfl:   •  tamsulosin (FLOMAX) 0.4 MG capsule 24 hr capsule, Take 1 capsule by mouth every night at bedtime., Disp: , Rfl:   Past Medical History:   Diagnosis Date   • Anxiety    • CHF (congestive heart failure) (HCC)     Acute   • Depression    • Hypertension      Social History     Socioeconomic History   • Marital status: Single   Tobacco Use   • Smoking status: Every Day     Packs/day: 1.00     Types: Cigarettes     Start date: 1975   • Smokeless tobacco: Never   • Tobacco comments:      Pt states he has cut back to 14 cigarettes per day.   Vaping Use   • Vaping Use: Never used   Substance and Sexual Activity   • Alcohol use: Not Currently   • Drug use: Never   • Sexual activity: Defer       Review of Systems   Constitutional: Positive for fatigue (chronic and unchanged). Negative for malaise/fatigue and weight gain.   HENT: Negative for nosebleeds.    Cardiovascular: Positive for dyspnea on exertion (unchanged). Negative for chest pain, irregular heartbeat, leg swelling, near-syncope, orthopnea, palpitations, paroxysmal nocturnal dyspnea and syncope.   Respiratory: Negative for chest tightness and shortness of breath.    Hematologic/Lymphatic: Bruises/bleeds easily.   Genitourinary: Negative for hematuria.   Neurological: Negative for dizziness and weakness.   All other systems reviewed and are negative.         Objective:     Vitals reviewed.   Constitutional:       General: Not in acute distress.     Appearance: Normal appearance. Well-developed. Obese.   Eyes:      Pupils: Pupils are equal, round, and reactive to light.   HENT:      Head: Normocephalic and atraumatic.      Nose: Nose normal.   Neck:      Vascular: No carotid bruit.   Pulmonary:      Effort: Pulmonary effort is normal. No respiratory distress.      Breath sounds: Normal breath sounds. No wheezing. No rales.   Cardiovascular:      Normal rate. Regular rhythm.      Murmurs: There is no murmur.   Edema:     Peripheral edema absent.   Abdominal:      General: There is no distension.      Palpations: Abdomen is soft.   Musculoskeletal: Normal range of motion.      Cervical back: Normal range of motion and neck supple. Skin:     General: Skin is warm.      Findings: No erythema or rash.   Neurological:      General: No focal deficit present.      Mental Status: Alert and oriented to person, place, and time.   Psychiatric:         Attention and Perception: Attention normal.         Mood and Affect: Mood normal.         Speech:  "Speech normal.         Behavior: Behavior normal.         Thought Content: Thought content normal.         Judgment: Judgment normal.         /90   Pulse 67   Ht 170.2 cm (67\")   Wt 84.4 kg (186 lb)   SpO2 97%   BMI 29.13 kg/m²       ECG 12 Lead    Date/Time: 2/28/2023 8:39 AM  Performed by: Mason Yap APRN  Authorized by: Mason Yap APRN   Comparison: compared with previous ECG from 6/14/2022  Similar to previous ECG  Rhythm: sinus rhythm  Rate: normal  BPM: 66  Other findings: left ventricular hypertrophy            Lab Review:     Nuclear stress 6/21/2022:  Interpretation Summary  · Myocardial perfusion imaging indicates a medium-sized infarct located in the basal to mid inferior wall with no significant ischemia noted.  · Left ventricular ejection fraction is normal. There is akinesis of the basal to mid inferior wall. (Calculated EF = 60%).  · Impressions are consistent with a low risk study.    Results for orders placed in visit on 06/14/22    Adult Transthoracic Echo Complete W/ Cont if Necessary Per Protocol    Interpretation Summary  · Left ventricular ejection fraction appears to be 56 - 60%. Left ventricular systolic function is normal.  · Left ventricular wall thickness is consistent with moderate concentric hypertrophy.  · The following left ventricular wall segments are akinetic: basal inferior.  · Left ventricular diastolic function is consistent with (grade I) impaired relaxation.  · Normal right ventricular cavity size and systolic function noted.  · There is no significant (greater than mild) valvular dysfunction.    Lab Results   Component Value Date    CHOL 162 06/13/2022    CHLPL 327 (H) 08/18/2014    TRIG 240 (H) 06/13/2022    HDL 28 (L) 06/13/2022    LDL 93 06/13/2022     DK 12/13/2022:  IMPRESSION:  Impression:  1. Mild arterial insufficiency of the right lower extremity at rest.  2. Mild arterial insufficiency of the left lower extremity at rest.         I have " personally reviewed labs, echo, ABIs, nuclear stress test and past office notes prior to patients visit  Assessment:          Diagnosis Plan   1. Coronary artery disease of native artery of native heart with stable angina pectoris ([facility])        2. Ischemic cardiomyopathy        3. Essential hypertension        4. Dyslipidemia        5. PAD (peripheral artery disease) ([facility])        6. Carotid stenosis, asymptomatic, right        7. CKD (chronic kidney disease) stage 4, GFR 15-29 ml/min ([facility])        8. Tobacco abuse        9. Overweight with body mass index (BMI) of 29 to 29.9 in adult               Plan:       1. CAD: LHC from 8/2014 demonstrated 50% stenosis of mid LAD, and a  of RCA. Nuclear stress test 6/21/2022 revealed medium-sized infarct located in the basal to mid inferior wall with no significant ischemia noted, low risk study. Continue plavix, atorvastatin and metoprolol    2. Ischemic cardiomyopathy: EF was 40-45% on echo from 8/17 2014.  Echo 6/21/2022 showed LVEF improved to 56-60%. Continue metoprolol and lisinopril.     3. Hypertension: elevated in office today; patient reports that BP has been elevated at other offices as well. Will increase Norvasc. Recommended to monitor routinely and notify office if >140/90.     4. Hyperlipidemia: managed and followed by PCP. Lipid panel demonstrates poor control on 6/13/2022; elevated Triglycerides, low HDL and LDL of 90 (goal <70). Continue atorvastatin and fenofibrate.    5. Peripheral artery disease: Severe disease noted on DK and CTA from 5/2022. S/p left common femoral endarterectomy with bilateral iliac stenting on 7/13/2022. DK 12/13/2022 showed mild arterial insuffiencey bilaterally. Continue to follow with vascular, Dr Vaz    6. Carotid stenosis: Follows with Dr Vaz. Carotid US 12/13/2022 showed 70-99% stenosis of right ICA and complete occlusion of left ICA. Will follow up in 6 months.     7. CKD stage IV: patient is following with  nephrology.     8. Tobacco abuse: Campbell Casas  reports that he has been smoking cigarettes. He started smoking about 48 years ago. He has been smoking an average of 1 pack per day. He has never used smokeless tobacco.. I have educated him on the risk of diseases from using tobacco products such as cancer, COPD and heart disease. Patient has been working on decreasing cigarettes. I spent 3  minutes counseling the patient.    9. Obesity: BMI is >= 25 and <30. (Overweight) The following options were offered after discussion;: exercise counseling/recommendations and nutrition counseling/recommendations    I attest that all portions of this note reviewed and all information has been updated by myself to reflect the patient's current status.      I spent 38 minutes caring for Campbell on this date of service. This time includes time spent by me in the following activities:preparing for the visit, reviewing tests, obtaining and/or reviewing a separately obtained history, performing a medically appropriate examination and/or evaluation , counseling and educating the patient/family/caregiver, ordering medications, tests, or procedures and documenting information in the medical record    I spent 2 minutes on the separately reported service of EKG. This time is not included in the time used to support the E/M service also reported today.    Patient is to return in 6 months or sooner if needed

## 2023-04-05 LAB
25(OH)D3 SERPL-MCNC: 30.8 NG/ML
ALBUMIN SERPL-MCNC: 3.9 G/DL (ref 3.5–5.2)
ALP SERPL-CCNC: 86 U/L (ref 40–130)
ALT SERPL-CCNC: 18 U/L (ref 5–41)
ANION GAP SERPL CALCULATED.3IONS-SCNC: 8 MMOL/L (ref 7–19)
AST SERPL-CCNC: 23 U/L (ref 5–40)
BASOPHILS # BLD: 0.1 K/UL (ref 0–0.2)
BASOPHILS NFR BLD: 0.7 % (ref 0–1)
BILIRUB SERPL-MCNC: <0.2 MG/DL (ref 0.2–1.2)
BILIRUB UR QL STRIP: NEGATIVE
BUN SERPL-MCNC: 12 MG/DL (ref 8–23)
CALCIUM SERPL-MCNC: 9.6 MG/DL (ref 8.8–10.2)
CHLORIDE SERPL-SCNC: 103 MMOL/L (ref 98–111)
CLARITY UR: CLEAR
CO2 SERPL-SCNC: 30 MMOL/L (ref 22–29)
COLOR UR: YELLOW
CREAT SERPL-MCNC: 1.5 MG/DL (ref 0.5–1.2)
CREAT UR-MCNC: 132.7 MG/DL (ref 4.2–622)
EOSINOPHIL # BLD: 0.3 K/UL (ref 0–0.6)
EOSINOPHIL NFR BLD: 3.2 % (ref 0–5)
ERYTHROCYTE [DISTWIDTH] IN BLOOD BY AUTOMATED COUNT: 12.9 % (ref 11.5–14.5)
GLUCOSE SERPL-MCNC: 114 MG/DL (ref 74–109)
GLUCOSE UR STRIP.AUTO-MCNC: NEGATIVE MG/DL
HCT VFR BLD AUTO: 46.4 % (ref 42–52)
HGB BLD-MCNC: 15.3 G/DL (ref 14–18)
HGB UR STRIP.AUTO-MCNC: NEGATIVE MG/L
IMM GRANULOCYTES # BLD: 0.1 K/UL
KETONES UR STRIP.AUTO-MCNC: NEGATIVE MG/DL
LEUKOCYTE ESTERASE UR QL STRIP.AUTO: NEGATIVE
LYMPHOCYTES # BLD: 1.8 K/UL (ref 1.1–4.5)
LYMPHOCYTES NFR BLD: 21.3 % (ref 20–40)
MAGNESIUM SERPL-MCNC: 1.9 MG/DL (ref 1.6–2.4)
MCH RBC QN AUTO: 29.6 PG (ref 27–31)
MCHC RBC AUTO-ENTMCNC: 33 G/DL (ref 33–37)
MCV RBC AUTO: 89.7 FL (ref 80–94)
MONOCYTES # BLD: 0.7 K/UL (ref 0–0.9)
MONOCYTES NFR BLD: 8.7 % (ref 0–10)
NEUTROPHILS # BLD: 5.4 K/UL (ref 1.5–7.5)
NEUTS SEG NFR BLD: 65.5 % (ref 50–65)
NITRITE UR QL STRIP.AUTO: NEGATIVE
PH UR STRIP.AUTO: 6.5 [PH] (ref 5–8)
PHOSPHATE SERPL-MCNC: 2.6 MG/DL (ref 2.5–4.5)
PLATELET # BLD AUTO: 221 K/UL (ref 130–400)
PMV BLD AUTO: 10.3 FL (ref 9.4–12.4)
POTASSIUM SERPL-SCNC: 4.6 MMOL/L (ref 3.5–5)
PROT SERPL-MCNC: 6.6 G/DL (ref 6.6–8.7)
PROT UR STRIP.AUTO-MCNC: NEGATIVE MG/DL
PROT UR-MCNC: 14 MG/DL (ref 15–45)
PTH-INTACT SERPL-MCNC: 26.1 PG/ML (ref 15–65)
RBC # BLD AUTO: 5.17 M/UL (ref 4.7–6.1)
SODIUM SERPL-SCNC: 141 MMOL/L (ref 136–145)
SP GR UR STRIP.AUTO: 1.01 (ref 1–1.03)
URATE SERPL-MCNC: 5.4 MG/DL (ref 3.4–7)
UROBILINOGEN UR STRIP.AUTO-MCNC: 1 E.U./DL
WBC # BLD AUTO: 8.2 K/UL (ref 4.8–10.8)

## 2023-07-21 ENCOUNTER — HOSPITAL ENCOUNTER (INPATIENT)
Age: 68
LOS: 14 days | Discharge: HOME HEALTH | End: 2023-08-04
Attending: PSYCHIATRY & NEUROLOGY | Admitting: PSYCHIATRY & NEUROLOGY
Payer: MEDICARE

## 2023-07-21 DIAGNOSIS — R41.82 ALTERED MENTAL STATUS, UNSPECIFIED ALTERED MENTAL STATUS TYPE: Primary | ICD-10-CM

## 2023-07-21 DIAGNOSIS — I60.9 SUBARACHNOID HEMORRHAGE (HCC): ICD-10-CM

## 2023-07-21 DIAGNOSIS — R41.89 COGNITIVE DEFICITS: ICD-10-CM

## 2023-07-21 DIAGNOSIS — I63.9 ACUTE ISCHEMIC STROKE (HCC): ICD-10-CM

## 2023-07-21 DIAGNOSIS — H53.149 VISUAL DISCOMFORT, UNSPECIFIED LATERALITY: ICD-10-CM

## 2023-07-21 PROBLEM — E78.49 OTHER HYPERLIPIDEMIA: Status: ACTIVE | Noted: 2023-07-21

## 2023-07-21 PROBLEM — S06.9XAA TBI (TRAUMATIC BRAIN INJURY) (HCC): Status: ACTIVE | Noted: 2023-07-21

## 2023-07-21 PROBLEM — I10 ESSENTIAL HYPERTENSION: Status: ACTIVE | Noted: 2023-07-21

## 2023-07-21 PROCEDURE — 6360000002 HC RX W HCPCS: Performed by: PSYCHIATRY & NEUROLOGY

## 2023-07-21 PROCEDURE — 6370000000 HC RX 637 (ALT 250 FOR IP): Performed by: PSYCHIATRY & NEUROLOGY

## 2023-07-21 PROCEDURE — 1180000000 HC REHAB R&B

## 2023-07-21 RX ORDER — LISINOPRIL 20 MG/1
40 TABLET ORAL DAILY
Status: DISCONTINUED | OUTPATIENT
Start: 2023-07-22 | End: 2023-08-04 | Stop reason: HOSPADM

## 2023-07-21 RX ORDER — OXYBUTYNIN CHLORIDE 5 MG/1
5 TABLET, EXTENDED RELEASE ORAL DAILY
Status: ON HOLD | COMMUNITY
End: 2023-08-04 | Stop reason: HOSPADM

## 2023-07-21 RX ORDER — METOPROLOL SUCCINATE 50 MG/1
100 TABLET, EXTENDED RELEASE ORAL DAILY
Status: DISCONTINUED | OUTPATIENT
Start: 2023-07-22 | End: 2023-08-04 | Stop reason: HOSPADM

## 2023-07-21 RX ORDER — CLOPIDOGREL BISULFATE 75 MG/1
75 TABLET ORAL DAILY
Status: ON HOLD | COMMUNITY
End: 2023-08-04 | Stop reason: HOSPADM

## 2023-07-21 RX ORDER — TRAZODONE HYDROCHLORIDE 100 MG/1
150 TABLET ORAL NIGHTLY PRN
Status: ON HOLD | COMMUNITY
End: 2023-08-04 | Stop reason: HOSPADM

## 2023-07-21 RX ORDER — ASPIRIN 81 MG/1
81 TABLET ORAL DAILY
Status: ON HOLD | COMMUNITY
End: 2023-08-04 | Stop reason: HOSPADM

## 2023-07-21 RX ORDER — FLUOXETINE HYDROCHLORIDE 20 MG/1
20 CAPSULE ORAL DAILY
Status: DISCONTINUED | OUTPATIENT
Start: 2023-07-22 | End: 2023-08-04 | Stop reason: HOSPADM

## 2023-07-21 RX ORDER — INSULIN LISPRO 100 [IU]/ML
0-4 INJECTION, SOLUTION INTRAVENOUS; SUBCUTANEOUS NIGHTLY
Status: DISCONTINUED | OUTPATIENT
Start: 2023-07-21 | End: 2023-07-31

## 2023-07-21 RX ORDER — OXYBUTYNIN CHLORIDE 5 MG/1
5 TABLET, EXTENDED RELEASE ORAL DAILY
Status: DISCONTINUED | OUTPATIENT
Start: 2023-07-22 | End: 2023-08-04 | Stop reason: HOSPADM

## 2023-07-21 RX ORDER — FENOFIBRATE 54 MG/1
54 TABLET ORAL DAILY
Status: DISCONTINUED | OUTPATIENT
Start: 2023-07-22 | End: 2023-08-04 | Stop reason: HOSPADM

## 2023-07-21 RX ORDER — ATORVASTATIN CALCIUM 80 MG/1
80 TABLET, FILM COATED ORAL DAILY
Status: ON HOLD | COMMUNITY
End: 2023-08-04 | Stop reason: SDUPTHER

## 2023-07-21 RX ORDER — DIAZEPAM 10 MG/1
10 TABLET ORAL 3 TIMES DAILY PRN
Status: ON HOLD | COMMUNITY
End: 2023-08-04 | Stop reason: HOSPADM

## 2023-07-21 RX ORDER — POLYETHYLENE GLYCOL 3350 17 G/17G
17 POWDER, FOR SOLUTION ORAL DAILY PRN
Status: DISCONTINUED | OUTPATIENT
Start: 2023-07-21 | End: 2023-08-04 | Stop reason: HOSPADM

## 2023-07-21 RX ORDER — ALOGLIPTIN 25 MG/1
25 TABLET, FILM COATED ORAL DAILY
Status: DISCONTINUED | OUTPATIENT
Start: 2023-07-22 | End: 2023-07-25

## 2023-07-21 RX ORDER — DIAZEPAM 5 MG/1
10 TABLET ORAL 3 TIMES DAILY PRN
Status: DISCONTINUED | OUTPATIENT
Start: 2023-07-21 | End: 2023-08-03

## 2023-07-21 RX ORDER — INSULIN LISPRO 100 [IU]/ML
0-4 INJECTION, SOLUTION INTRAVENOUS; SUBCUTANEOUS
Status: DISCONTINUED | OUTPATIENT
Start: 2023-07-22 | End: 2023-07-31

## 2023-07-21 RX ORDER — FENOFIBRATE 54 MG/1
54 TABLET ORAL DAILY
Status: ON HOLD | COMMUNITY
End: 2023-08-04 | Stop reason: HOSPADM

## 2023-07-21 RX ORDER — METOPROLOL SUCCINATE 100 MG/1
100 TABLET, EXTENDED RELEASE ORAL DAILY
Status: ON HOLD | COMMUNITY
End: 2023-08-04 | Stop reason: HOSPADM

## 2023-07-21 RX ORDER — ACETAMINOPHEN 325 MG/1
650 TABLET ORAL EVERY 4 HOURS PRN
Status: DISCONTINUED | OUTPATIENT
Start: 2023-07-21 | End: 2023-08-04 | Stop reason: HOSPADM

## 2023-07-21 RX ORDER — FLUOXETINE HYDROCHLORIDE 20 MG/1
20 CAPSULE ORAL DAILY
Status: ON HOLD | COMMUNITY
End: 2023-08-04 | Stop reason: SDUPTHER

## 2023-07-21 RX ORDER — DEXTROSE MONOHYDRATE 100 MG/ML
INJECTION, SOLUTION INTRAVENOUS CONTINUOUS PRN
Status: DISCONTINUED | OUTPATIENT
Start: 2023-07-21 | End: 2023-07-31

## 2023-07-21 RX ORDER — HALOPERIDOL 5 MG/ML
2 INJECTION INTRAMUSCULAR EVERY 6 HOURS PRN
Status: COMPLETED | OUTPATIENT
Start: 2023-07-21 | End: 2023-07-21

## 2023-07-21 RX ORDER — ATORVASTATIN CALCIUM 80 MG/1
80 TABLET, FILM COATED ORAL DAILY
Status: DISCONTINUED | OUTPATIENT
Start: 2023-07-21 | End: 2023-08-04 | Stop reason: HOSPADM

## 2023-07-21 RX ORDER — LISINOPRIL 40 MG/1
40 TABLET ORAL DAILY
Status: ON HOLD | COMMUNITY
End: 2023-08-04 | Stop reason: SDUPTHER

## 2023-07-21 RX ADMIN — DIAZEPAM 10 MG: 5 TABLET ORAL at 20:59

## 2023-07-21 RX ADMIN — HALOPERIDOL LACTATE 2 MG: 5 INJECTION, SOLUTION INTRAMUSCULAR at 21:19

## 2023-07-22 ENCOUNTER — APPOINTMENT (OUTPATIENT)
Dept: CT IMAGING | Age: 68
End: 2023-07-22
Attending: PSYCHIATRY & NEUROLOGY
Payer: MEDICARE

## 2023-07-22 PROBLEM — I62.00 SUBDURAL HEMORRHAGE (HCC): Status: ACTIVE | Noted: 2023-07-22

## 2023-07-22 PROBLEM — R41.82 ALTERED MENTAL STATE: Status: ACTIVE | Noted: 2023-07-22

## 2023-07-22 PROBLEM — F19.11 HISTORY OF DRUG ABUSE (HCC): Status: ACTIVE | Noted: 2023-07-22

## 2023-07-22 LAB
ALBUMIN SERPL-MCNC: 4.1 G/DL (ref 3.5–5.2)
ALP SERPL-CCNC: 103 U/L (ref 40–130)
ALT SERPL-CCNC: 49 U/L (ref 5–41)
ANION GAP SERPL CALCULATED.3IONS-SCNC: 13 MMOL/L (ref 7–19)
AST SERPL-CCNC: 57 U/L (ref 5–40)
BILIRUB SERPL-MCNC: 0.4 MG/DL (ref 0.2–1.2)
BUN SERPL-MCNC: 20 MG/DL (ref 8–23)
CALCIUM SERPL-MCNC: 9.9 MG/DL (ref 8.8–10.2)
CHLORIDE SERPL-SCNC: 103 MMOL/L (ref 98–111)
CO2 SERPL-SCNC: 25 MMOL/L (ref 22–29)
CREAT SERPL-MCNC: 1 MG/DL (ref 0.5–1.2)
ERYTHROCYTE [DISTWIDTH] IN BLOOD BY AUTOMATED COUNT: 13.1 % (ref 11.5–14.5)
GLUCOSE BLD-MCNC: 100 MG/DL (ref 70–99)
GLUCOSE BLD-MCNC: 104 MG/DL (ref 70–99)
GLUCOSE BLD-MCNC: 128 MG/DL (ref 70–99)
GLUCOSE BLD-MCNC: 148 MG/DL (ref 70–99)
GLUCOSE SERPL-MCNC: 85 MG/DL (ref 74–109)
HCT VFR BLD AUTO: 47.4 % (ref 42–52)
HGB BLD-MCNC: 15.7 G/DL (ref 14–18)
MCH RBC QN AUTO: 29.3 PG (ref 27–31)
MCHC RBC AUTO-ENTMCNC: 33.1 G/DL (ref 33–37)
MCV RBC AUTO: 88.4 FL (ref 80–94)
PERFORMED ON: ABNORMAL
PLATELET # BLD AUTO: 246 K/UL (ref 130–400)
PMV BLD AUTO: 10.3 FL (ref 9.4–12.4)
POTASSIUM SERPL-SCNC: 4.7 MMOL/L (ref 3.5–5)
PREALB SERPL-MCNC: 28 MG/DL (ref 20–40)
PROT SERPL-MCNC: 7.6 G/DL (ref 6.6–8.7)
RBC # BLD AUTO: 5.36 M/UL (ref 4.7–6.1)
SODIUM SERPL-SCNC: 141 MMOL/L (ref 136–145)
TSH SERPL DL<=0.005 MIU/L-ACNC: 2.69 UIU/ML (ref 0.35–5.5)
VIT B12 SERPL-MCNC: 880 PG/ML (ref 211–946)
WBC # BLD AUTO: 8.2 K/UL (ref 4.8–10.8)

## 2023-07-22 PROCEDURE — 94150 VITAL CAPACITY TEST: CPT

## 2023-07-22 PROCEDURE — 6370000000 HC RX 637 (ALT 250 FOR IP): Performed by: PSYCHIATRY & NEUROLOGY

## 2023-07-22 PROCEDURE — 84443 ASSAY THYROID STIM HORMONE: CPT

## 2023-07-22 PROCEDURE — 96116 NUBHVL XM PHYS/QHP 1ST HR: CPT

## 2023-07-22 PROCEDURE — 92522 EVALUATE SPEECH PRODUCTION: CPT

## 2023-07-22 PROCEDURE — 84134 ASSAY OF PREALBUMIN: CPT

## 2023-07-22 PROCEDURE — 36415 COLL VENOUS BLD VENIPUNCTURE: CPT

## 2023-07-22 PROCEDURE — 99223 1ST HOSP IP/OBS HIGH 75: CPT | Performed by: PSYCHIATRY & NEUROLOGY

## 2023-07-22 PROCEDURE — 97165 OT EVAL LOW COMPLEX 30 MIN: CPT

## 2023-07-22 PROCEDURE — 97161 PT EVAL LOW COMPLEX 20 MIN: CPT

## 2023-07-22 PROCEDURE — 85027 COMPLETE CBC AUTOMATED: CPT

## 2023-07-22 PROCEDURE — 82607 VITAMIN B-12: CPT

## 2023-07-22 PROCEDURE — 82962 GLUCOSE BLOOD TEST: CPT

## 2023-07-22 PROCEDURE — 80053 COMPREHEN METABOLIC PANEL: CPT

## 2023-07-22 PROCEDURE — 97116 GAIT TRAINING THERAPY: CPT

## 2023-07-22 PROCEDURE — 97110 THERAPEUTIC EXERCISES: CPT

## 2023-07-22 PROCEDURE — 94760 N-INVAS EAR/PLS OXIMETRY 1: CPT

## 2023-07-22 PROCEDURE — 70450 CT HEAD/BRAIN W/O DYE: CPT

## 2023-07-22 PROCEDURE — 1180000000 HC REHAB R&B

## 2023-07-22 PROCEDURE — 97535 SELF CARE MNGMENT TRAINING: CPT

## 2023-07-22 RX ORDER — QUETIAPINE FUMARATE 25 MG/1
25 TABLET, FILM COATED ORAL 2 TIMES DAILY
Status: DISCONTINUED | OUTPATIENT
Start: 2023-07-22 | End: 2023-08-04 | Stop reason: HOSPADM

## 2023-07-22 RX ORDER — MULTIVITAMIN WITH IRON
1 TABLET ORAL DAILY
Status: DISCONTINUED | OUTPATIENT
Start: 2023-07-22 | End: 2023-08-04 | Stop reason: HOSPADM

## 2023-07-22 RX ORDER — GAUZE BANDAGE 2" X 2"
100 BANDAGE TOPICAL DAILY
Status: DISCONTINUED | OUTPATIENT
Start: 2023-07-22 | End: 2023-08-04 | Stop reason: HOSPADM

## 2023-07-22 RX ADMIN — DIAZEPAM 10 MG: 5 TABLET ORAL at 11:59

## 2023-07-22 RX ADMIN — LISINOPRIL 40 MG: 20 TABLET ORAL at 08:03

## 2023-07-22 RX ADMIN — QUETIAPINE FUMARATE 25 MG: 25 TABLET ORAL at 19:28

## 2023-07-22 RX ADMIN — OXYBUTYNIN CHLORIDE 5 MG: 5 TABLET, EXTENDED RELEASE ORAL at 08:03

## 2023-07-22 RX ADMIN — THERA TABS 1 TABLET: TAB at 09:39

## 2023-07-22 RX ADMIN — ATORVASTATIN CALCIUM 80 MG: 80 TABLET, FILM COATED ORAL at 08:03

## 2023-07-22 RX ADMIN — QUETIAPINE FUMARATE 25 MG: 25 TABLET ORAL at 09:39

## 2023-07-22 RX ADMIN — FENOFIBRATE 54 MG: 54 TABLET, FILM COATED ORAL at 08:03

## 2023-07-22 RX ADMIN — DIAZEPAM 10 MG: 5 TABLET ORAL at 19:28

## 2023-07-22 RX ADMIN — Medication 100 MG: at 09:39

## 2023-07-22 RX ADMIN — ALOGLIPTIN 25 MG: 25 TABLET, FILM COATED ORAL at 08:03

## 2023-07-22 RX ADMIN — FLUOXETINE 20 MG: 20 CAPSULE ORAL at 08:03

## 2023-07-22 RX ADMIN — METOPROLOL SUCCINATE 100 MG: 50 TABLET, EXTENDED RELEASE ORAL at 08:03

## 2023-07-22 NOTE — PROGRESS NOTES
Jame Tineo arrived to room # [phone]. Presented with: TBI/SAH  Mental Status: Patient is disoriented. There were no vitals filed for this visit. Patient safety contract and falls prevention contract reviewed with patient Yes. Oriented Patient and Family to room. Call light within reach. Yes.   Needs, issues or concerns expressed at this time: no.      Electronically signed by Chelsey Heller RN on 7/21/2023 at 10:41 PM

## 2023-07-22 NOTE — PROGRESS NOTES
WFL  Eye Contact: WFL  Monotone: WFL  Topic Maintenance: Mild  Turn Taking: Mild    Cognition:      Orientation  Overall Orientation Status: Impaired  Orientation Level: Oriented to person;Disoriented to place; Disoriented to time;Disoriented to situation  Attention  Attention: Exceptions to [facility]  Alternating Attention: Mild  Divided Attention: Mild  Selective Attention: Mild  Sustained Attention: Mild  Memory  Memory: Exceptions to [facility]  Daily Routines: WFL  Long-term Memory: WFL  People Encountered: Mild  Prospective Memory: Mild  Short-term Memory: Moderate  Working Memory: Moderate  Problem Solving  Problem Solving: Exceptions to [facility]  Simple Functional Tasks: [facility]  Verbal Reasoning Skills: Mild  Initiation: Mild  Sequencing: Mild  Executive Function Skills: Moderate  Managing Finances: Mild  Managing Medications: Mild  Performing Discharge Planning: Mild  Numeric Reasoning  Numeric Reasoning: Exceptions to [facility]   Calculations: Mild  Money Management: Mild  Time: WFL  Abstract Reasoning  Abstract Reasoning: Exceptions to [facility]  Convergent Thinking: Mild  Divergent Thinking: Mild  Safety/Judgment  Safety/Judgment: Exceptions to [facility]  Complex Functional Tasks: Moderate  Novel Situations: Mild  Routine Tasks: Mild  Unable to Self-monitor and Self-correct Consistently: Moderate  Insight: Moderate    Impulsive:  Moderate  Flexibility of Thought: Moderate    Additional Assessments:             Prognosis:  Speech Therapy Prognosis  Prognosis: Good  Individuals consulted  Consulted and agree with results and recommendations: Patient;RN  RN Name: Vidhya Cristobal    Education:  Patient Education Response: Verbalizes understanding          Therapy Time:   Individual Concurrent Group Co-treatment   Time In 1100         Time Out 1150         Minutes 50                 Electronically signed by JAMILA Kee on 7/22/2023 at 3:21 PM

## 2023-07-22 NOTE — PLAN OF CARE
Problem: Discharge Planning  Goal: Discharge to home or other facility with appropriate resources  Outcome: Not Progressing     Problem: Safety - Adult  Goal: Free from fall injury  Outcome: Not Progressing     Problem: Skin/Tissue Integrity  Goal: Absence of new skin breakdown  Description: 1. Monitor for areas of redness and/or skin breakdown  2. Assess vascular access sites hourly  3. Every 4-6 hours minimum:  Change oxygen saturation probe site  4. Every 4-6 hours:  If on nasal continuous positive airway pressure, respiratory therapy assess nares and determine need for appliance change or resting period. Outcome: Not Progressing     Problem: Discharge Planning  Goal: Discharge to home or other facility with appropriate resources  Outcome: Not Progressing     Problem: Safety - Adult  Goal: Free from fall injury  Outcome: Not Progressing     Problem: Skin/Tissue Integrity  Goal: Absence of new skin breakdown  Description: 1. Monitor for areas of redness and/or skin breakdown  2. Assess vascular access sites hourly  3. Every 4-6 hours minimum:  Change oxygen saturation probe site  4. Every 4-6 hours:  If on nasal continuous positive airway pressure, respiratory therapy assess nares and determine need for appliance change or resting period.   Outcome: Not Progressing
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
## 2023-07-22 NOTE — PROGRESS NOTES
Bed alarm sounded. Into room, pt up walking around and cursing at staff. \"Get the f... out of my room. \"  Slammed the door after staff exited his room. Charge nurse notified.
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
## 2023-07-22 NOTE — PROGRESS NOTES
Message sent to Dr Chong Lopez re patient behavior. Pt impulsive and still getting out of bed. Very agitated and yelling at son and staff. Orders rec for haldol 2mg IM x one dose. Pt agreed to medication at this time. Security in room as well as family. Spoke with clinical house supervisor. Sitter called in to sit outside of room.    Electronically signed by Melisa Bernstein RN on 7/22/23 at 1:50 AM CDT

## 2023-07-22 NOTE — PROGRESS NOTES
Bed alarm sounding. Upon entering room, pt standing at bedside arguing with girlfriend that he was leaving and going to smoke. Yelling for her to give him her shoes and refusing to get back in the bed. Security called and talked with patient. Patient agreed to get back in bed and valium given at this time. Patient yelling for everyone to get out of his room and close the door. Bed alarm on and staff and family left room.    Electronically signed by Augustus Pritchard RN on 7/22/23 at 1:42 AM CDT

## 2023-07-22 NOTE — PROGRESS NOTES
Comprehensive Nutrition Assessment    Type and Reason for Visit:  Initial    Nutrition Recommendations/Plan:   Follow for SLP recommendations, po intake, need for snack/ONS     Malnutrition Assessment:  Malnutrition Status:  No malnutrition (07/22/23 1327)    Context:  Acute Illness     Findings of the 6 clinical characteristics of malnutrition:  Energy Intake:  Mild decrease in energy intake (Comment)  Weight Loss:  No significant weight loss     Body Fat Loss:  No significant body fat loss     Muscle Mass Loss:  No significant muscle mass loss    Fluid Accumulation:  No significant fluid accumulation Extremities   Strength:  Not Performed    Nutrition Assessment:    Following patint for new admission to Rehab unit. PO intake is good with intake ranging 50-75%. Weight is stable with only a 6# decreased in 5 months. Aware SLP working with pt. Nutrition Related Findings:      Wound Type: None (rash, abrasion)       Current Nutrition Intake & Therapies:    Average Meal Intake: 51-75%  Average Supplements Intake: None Ordered  ADULT DIET; Easy to Chew; 4 carb choices (60 gm/meal)    Anthropometric Measures:  Height: 5' 9\" (175.3 cm)  Ideal Body Weight (IBW): 160 lbs (73 kg)    Admission Body Weight: 180 lb (81.6 kg)  Current Body Weight: 180 lb (81.6 kg), 112.5 % IBW. Current BMI (kg/m2): 26.6  Usual Body Weight: 186 lb (84.4 kg) (2/2023)  % Weight Change (Calculated): -3.2  BMI Categories: Overweight (BMI 25.0-29. 9)    Estimated Daily Nutrient Needs:  Energy Requirements Based On: Kcal/kg  Weight Used for Energy Requirements: Current  Energy (kcal/day): 7838-6370 kcals (20-25 kcals/kg)  Weight Used for Protein Requirements: Ideal  Protein (g/day): 94-145g  Method Used for Fluid Requirements: 1 ml/kcal  Fluid (ml/day): 8107-4284 ml    Nutrition Diagnosis:   Biting/chewing (masticatory) difficulty related to acute injury/trauma as evidenced by swallow study results    Nutrition Interventions:   Food and/or

## 2023-07-22 NOTE — PROGRESS NOTES
4 Eyes Skin Assessment    Gabriela Gutierrez is being assessed upon: Admission    I agree that Dayo Chacon RN, along with Alireza Vera  have performed a thorough Head to Toe Skin Assessment on the patient. ALL assessment sites listed below have been assessed. Areas assessed by both nurses:     [x]   Head, Face, and Ears   [x]   Shoulders, Back, and Chest  [x]   Arms, Elbows, and Hands   [x]   Coccyx, Sacrum, and Ischium  [x]   Legs, Feet, and Heels    Does the Patient have Skin Breakdown?  No    Jani Prevention initiated: No  Wound Care Orders initiated: No    [facility] nurse consulted for Pressure Injury (Stage 3,4, Unstageable, DTI, NWPT, and Complex wounds) and New or Established Ostomies: No        Primary Nurse eSignature: Shayan Mitchell RN on 7/21/2023 at 10:40 PM      Co-Signer eSignature: Electronically signed by Yifan Hardwick LPN on 3/81/43 at 91:71 PM CDT
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
## 2023-07-23 LAB
GLUCOSE BLD-MCNC: 117 MG/DL (ref 70–99)
GLUCOSE BLD-MCNC: 70 MG/DL (ref 70–99)
GLUCOSE BLD-MCNC: 96 MG/DL (ref 70–99)
PERFORMED ON: ABNORMAL
PERFORMED ON: NORMAL
PERFORMED ON: NORMAL

## 2023-07-23 PROCEDURE — 99233 SBSQ HOSP IP/OBS HIGH 50: CPT | Performed by: PSYCHIATRY & NEUROLOGY

## 2023-07-23 PROCEDURE — 6370000000 HC RX 637 (ALT 250 FOR IP): Performed by: PSYCHIATRY & NEUROLOGY

## 2023-07-23 PROCEDURE — 1180000000 HC REHAB R&B

## 2023-07-23 PROCEDURE — 82962 GLUCOSE BLOOD TEST: CPT

## 2023-07-23 RX ORDER — AMLODIPINE BESYLATE 5 MG/1
5 TABLET ORAL DAILY
Status: DISCONTINUED | OUTPATIENT
Start: 2023-07-23 | End: 2023-08-04 | Stop reason: HOSPADM

## 2023-07-23 RX ADMIN — ALOGLIPTIN 25 MG: 25 TABLET, FILM COATED ORAL at 08:11

## 2023-07-23 RX ADMIN — DIAZEPAM 10 MG: 5 TABLET ORAL at 11:22

## 2023-07-23 RX ADMIN — OXYBUTYNIN CHLORIDE 5 MG: 5 TABLET, EXTENDED RELEASE ORAL at 08:11

## 2023-07-23 RX ADMIN — FENOFIBRATE 54 MG: 54 TABLET, FILM COATED ORAL at 08:11

## 2023-07-23 RX ADMIN — DIAZEPAM 10 MG: 5 TABLET ORAL at 20:46

## 2023-07-23 RX ADMIN — QUETIAPINE FUMARATE 25 MG: 25 TABLET ORAL at 20:40

## 2023-07-23 RX ADMIN — AMLODIPINE BESYLATE 5 MG: 5 TABLET ORAL at 11:21

## 2023-07-23 RX ADMIN — QUETIAPINE FUMARATE 25 MG: 25 TABLET ORAL at 08:11

## 2023-07-23 RX ADMIN — Medication 100 MG: at 08:11

## 2023-07-23 RX ADMIN — THERA TABS 1 TABLET: TAB at 08:11

## 2023-07-23 RX ADMIN — METOPROLOL SUCCINATE 100 MG: 50 TABLET, EXTENDED RELEASE ORAL at 08:11

## 2023-07-23 RX ADMIN — TRAZODONE HYDROCHLORIDE 150 MG: 50 TABLET ORAL at 20:46

## 2023-07-23 RX ADMIN — LISINOPRIL 40 MG: 20 TABLET ORAL at 08:11

## 2023-07-23 RX ADMIN — DIAZEPAM 10 MG: 5 TABLET ORAL at 16:20

## 2023-07-23 RX ADMIN — ATORVASTATIN CALCIUM 80 MG: 80 TABLET, FILM COATED ORAL at 20:39

## 2023-07-23 RX ADMIN — FLUOXETINE 20 MG: 20 CAPSULE ORAL at 08:10
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
## 2023-07-24 ENCOUNTER — TELEPHONE (OUTPATIENT)
Dept: PRIMARY CARE CLINIC | Age: 68
End: 2023-07-24

## 2023-07-24 LAB
ALBUMIN SERPL-MCNC: 3.6 G/DL (ref 3.5–5.2)
ALP SERPL-CCNC: 104 U/L (ref 40–130)
ALT SERPL-CCNC: 41 U/L (ref 5–41)
ANION GAP SERPL CALCULATED.3IONS-SCNC: 8 MMOL/L (ref 7–19)
AST SERPL-CCNC: 36 U/L (ref 5–40)
BASOPHILS # BLD: 0.1 K/UL (ref 0–0.2)
BASOPHILS NFR BLD: 1.1 % (ref 0–1)
BILIRUB SERPL-MCNC: 0.3 MG/DL (ref 0.2–1.2)
BUN SERPL-MCNC: 23 MG/DL (ref 8–23)
CALCIUM SERPL-MCNC: 9.3 MG/DL (ref 8.8–10.2)
CHLORIDE SERPL-SCNC: 102 MMOL/L (ref 98–111)
CO2 SERPL-SCNC: 27 MMOL/L (ref 22–29)
CREAT SERPL-MCNC: 1.3 MG/DL (ref 0.5–1.2)
EOSINOPHIL # BLD: 0.4 K/UL (ref 0–0.6)
EOSINOPHIL NFR BLD: 4 % (ref 0–5)
ERYTHROCYTE [DISTWIDTH] IN BLOOD BY AUTOMATED COUNT: 12.8 % (ref 11.5–14.5)
GLUCOSE BLD-MCNC: 115 MG/DL (ref 70–99)
GLUCOSE BLD-MCNC: 119 MG/DL (ref 70–99)
GLUCOSE BLD-MCNC: 145 MG/DL (ref 70–99)
GLUCOSE BLD-MCNC: 60 MG/DL (ref 70–99)
GLUCOSE BLD-MCNC: 86 MG/DL (ref 70–99)
GLUCOSE SERPL-MCNC: 102 MG/DL (ref 74–109)
HCT VFR BLD AUTO: 43.7 % (ref 42–52)
HGB BLD-MCNC: 14.5 G/DL (ref 14–18)
IMM GRANULOCYTES # BLD: 0.2 K/UL
LYMPHOCYTES # BLD: 3.3 K/UL (ref 1.1–4.5)
LYMPHOCYTES NFR BLD: 29.9 % (ref 20–40)
MCH RBC QN AUTO: 29.5 PG (ref 27–31)
MCHC RBC AUTO-ENTMCNC: 33.2 G/DL (ref 33–37)
MCV RBC AUTO: 89 FL (ref 80–94)
MONOCYTES # BLD: 1.3 K/UL (ref 0–0.9)
MONOCYTES NFR BLD: 11.7 % (ref 0–10)
NEUTROPHILS # BLD: 5.7 K/UL (ref 1.5–7.5)
NEUTS SEG NFR BLD: 51.5 % (ref 50–65)
PERFORMED ON: ABNORMAL
PERFORMED ON: NORMAL
PLATELET # BLD AUTO: 232 K/UL (ref 130–400)
PMV BLD AUTO: 10 FL (ref 9.4–12.4)
POTASSIUM SERPL-SCNC: 4.2 MMOL/L (ref 3.5–5)
PROT SERPL-MCNC: 6.6 G/DL (ref 6.6–8.7)
RBC # BLD AUTO: 4.91 M/UL (ref 4.7–6.1)
SODIUM SERPL-SCNC: 137 MMOL/L (ref 136–145)
WBC # BLD AUTO: 11.1 K/UL (ref 4.8–10.8)

## 2023-07-24 PROCEDURE — 97530 THERAPEUTIC ACTIVITIES: CPT

## 2023-07-24 PROCEDURE — 80053 COMPREHEN METABOLIC PANEL: CPT

## 2023-07-24 PROCEDURE — 99232 SBSQ HOSP IP/OBS MODERATE 35: CPT | Performed by: PSYCHIATRY & NEUROLOGY

## 2023-07-24 PROCEDURE — 97116 GAIT TRAINING THERAPY: CPT

## 2023-07-24 PROCEDURE — 97110 THERAPEUTIC EXERCISES: CPT

## 2023-07-24 PROCEDURE — 6370000000 HC RX 637 (ALT 250 FOR IP): Performed by: PSYCHIATRY & NEUROLOGY

## 2023-07-24 PROCEDURE — 36415 COLL VENOUS BLD VENIPUNCTURE: CPT

## 2023-07-24 PROCEDURE — 1180000000 HC REHAB R&B

## 2023-07-24 PROCEDURE — 92507 TX SP LANG VOICE COMM INDIV: CPT

## 2023-07-24 PROCEDURE — 85025 COMPLETE CBC W/AUTO DIFF WBC: CPT

## 2023-07-24 PROCEDURE — 82962 GLUCOSE BLOOD TEST: CPT

## 2023-07-24 RX ADMIN — QUETIAPINE FUMARATE 25 MG: 25 TABLET ORAL at 20:29

## 2023-07-24 RX ADMIN — QUETIAPINE FUMARATE 25 MG: 25 TABLET ORAL at 08:12

## 2023-07-24 RX ADMIN — TRAZODONE HYDROCHLORIDE 150 MG: 50 TABLET ORAL at 20:31

## 2023-07-24 RX ADMIN — THERA TABS 1 TABLET: TAB at 08:12

## 2023-07-24 RX ADMIN — LISINOPRIL 40 MG: 20 TABLET ORAL at 08:11

## 2023-07-24 RX ADMIN — ALOGLIPTIN 25 MG: 25 TABLET, FILM COATED ORAL at 08:11

## 2023-07-24 RX ADMIN — Medication 100 MG: at 08:12

## 2023-07-24 RX ADMIN — FENOFIBRATE 54 MG: 54 TABLET, FILM COATED ORAL at 08:12

## 2023-07-24 RX ADMIN — ATORVASTATIN CALCIUM 80 MG: 80 TABLET, FILM COATED ORAL at 20:29

## 2023-07-24 RX ADMIN — DIAZEPAM 10 MG: 5 TABLET ORAL at 20:31

## 2023-07-24 RX ADMIN — OXYBUTYNIN CHLORIDE 5 MG: 5 TABLET, EXTENDED RELEASE ORAL at 08:11

## 2023-07-24 RX ADMIN — METOPROLOL SUCCINATE 100 MG: 50 TABLET, EXTENDED RELEASE ORAL at 08:11

## 2023-07-24 RX ADMIN — FLUOXETINE 20 MG: 20 CAPSULE ORAL at 08:11

## 2023-07-24 RX ADMIN — AMLODIPINE BESYLATE 5 MG: 5 TABLET ORAL at 08:11

## 2023-07-24 ASSESSMENT — 9 HOLE PEG TEST
TESTTIME_SECONDS: 53
TESTTIME_SECONDS: 54
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
## 2023-07-24 NOTE — PROGRESS NOTES
Facility/Department: [facility] 8 REHAB UNIT  Daily Treatment Note  NAME: Merline Hemming  : 1955  MRN: [id number]    Date of Eval: 2023  Evaluating Therapist: JAMILA Lugo    Patient Diagnosis(es): has Acute ischemic stroke [facility]); Subarachnoid hemorrhage ([address]); TBI (traumatic brain injury) ([address]); Essential hypertension; Other hyperlipidemia; Cognitive deficits; Subdural hemorrhage ([address]); Altered mental state; and History of drug abuse ([address]) on their problem list.     Pt was seen for therapy in his room with his sitter present during the entire session. Pt was asleep in bed but was easily awakened. Pt answered the hospital room phone and spoke with someone. Pt asked the sitter, \"Am I in room 823? \" When pt hung up the phone, SLP asked him who he was talking to. Pt stated he did not know who it was, but the voice sounded familiar. Pt was oriented to [place] but not to year, month or date. Pt recalled 3 words in reverse order with 90% accuracy and 4 words in reverse order with 30% accuracy. Pt answered \"what\" questions with 100% accuracy. When given 3 category items, pt named additional category item with 75% accuracy with out cuing and 85% accuracy with cuing. Pt required multiple redirections during this task in order to complete. Pt followed written directions to complete task with 100% accuracy. When given check register activity, pt was able to independently answer 4/10 questions. Pt required maximum cuing to answer remaining six questions. Short Term Goals  Goal 1: Pt will complete memory tasks with 90% accuracy. Goal 2: Pt will complete problem solving tasks with 90% accuracy. Goal 3: Pt will complete verbal reasoning tasks with 90% accuracy. Goal 4: Pt will compete judgement/safety tasks with 90% accuracy. Plan:  Continued daily Speech/Language treatment with goals per patient's plan of care.                               JAMILA Lugo

## 2023-07-24 NOTE — PLAN OF CARE
Problem: Safety - Adult  Goal: Free from fall injury  7/24/2023 0918 by Jaden Clements RN  Outcome: Progressing  7/23/2023 2200 by Corinne Ardon RN  Outcome: Progressing   Up with 1 assist

## 2023-07-24 NOTE — PLAN OF CARE
Problem: Discharge Planning  Goal: Discharge to home or other facility with appropriate resources  Outcome: Progressing  Flowsheets (Taken 7/23/2023 2040)  Discharge to home or other facility with appropriate resources: Refer to discharge planning if patient needs post-hospital services based on physician order or complex needs related to functional status, cognitive ability or social support system     Problem: Safety - Adult  Goal: Free from fall injury  Outcome: Progressing     Problem: Skin/Tissue Integrity  Goal: Absence of new skin breakdown  Description: 1. Monitor for areas of redness and/or skin breakdown  2. Assess vascular access sites hourly  3. Every 4-6 hours minimum:  Change oxygen saturation probe site  4. Every 4-6 hours:  If on nasal continuous positive airway pressure, respiratory therapy assess nares and determine need for appliance change or resting period. Outcome: Progressing     Problem: Chronic Conditions and Co-morbidities  Goal: Patient's chronic conditions and co-morbidity symptoms are monitored and maintained or improved  Outcome: Progressing  Flowsheets (Taken 7/23/2023 2040)  Care Plan - Patient's Chronic Conditions and Co-Morbidity Symptoms are Monitored and Maintained or Improved: Monitor and assess patient's chronic conditions and comorbid symptoms for stability, deterioration, or improvement     Problem: Confusion  Goal: Confusion, delirium, dementia, or psychosis is improved or at baseline  Description: INTERVENTIONS:  1. Assess for possible contributors to thought disturbance, including medications, impaired vision or hearing, underlying metabolic abnormalities, dehydration, psychiatric diagnoses, and notify attending LIP  2. [place] high risk fall precautions, as indicated  3. Provide frequent short contacts to provide reality reorientation, refocusing and direction  4. Decrease environmental stimuli, including noise as appropriate  5.  Monitor and intervene to maintain

## 2023-07-24 NOTE — PROGRESS NOTES
Nutrition Assessment     Type and Reason for Visit: Reassess    Nutrition Recommendations/Plan:   Continue POC. Malnutrition Assessment:  Malnutrition Status: No malnutrition    Nutrition Assessment:  Pt remains nutritionally stable with PO intake avg >75% of meals. Glucose is well-controlled; ranging 96-148mg/dL so far. Wt remains stable. No other nutritional needs identified at this time.     Estimated Daily Nutrient Needs:  Energy (kcal):  6615-6249 kcals (20-25 kcals/kg) Weight Used for Energy Requirements: Current     Protein (g):  94-145g Weight Used for Protein Requirements: Ideal        Fluid (ml/day):  6758-0892 ml Method Used for Fluid Requirements: 1 ml/kcal    Nutrition Related Findings:     Wound Type: None (rash, abrasion)    Current Nutrition Therapies:    ADULT DIET; Easy to Chew; 4 carb choices (60 gm/meal)    Anthropometric Measures:  Height: 5' 9\" (175.3 cm)  Current Body Wt: 180 lb (81.6 kg)   BMI: 26.6    Nutrition Diagnosis:   Biting/chewing (masticatory) difficulty related to acute injury/trauma as evidenced by swallow study results    Nutrition Interventions:   Food and/or Nutrient Delivery: Continue Current Diet  Nutrition Education/Counseling: No recommendation at this time  Coordination of Nutrition Care: Continue to monitor while inpatient, Speech Therapy       Goals:  Previous Goal Met: Progressing toward Goal(s)  Goals: Meet at least 75% of estimated needs, PO intake 50% or greater       Nutrition Monitoring and Evaluation:   Behavioral-Environmental Outcomes: None Identified  Food/Nutrient Intake Outcomes: Food and Nutrient Intake  Physical Signs/Symptoms Outcomes: Biochemical Data, Chewing or Swallowing, Fluid Status or Edema, Weight, Skin    Discharge Planning:    No discharge needs at this time     Sheila Landon MS, RD, LD, [facility]  Contact: Dena Nance
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
## 2023-07-25 LAB
GLUCOSE BLD-MCNC: 103 MG/DL (ref 70–99)
GLUCOSE BLD-MCNC: 107 MG/DL (ref 70–99)
GLUCOSE BLD-MCNC: 145 MG/DL (ref 70–99)
GLUCOSE BLD-MCNC: 95 MG/DL (ref 70–99)
PERFORMED ON: ABNORMAL
PERFORMED ON: NORMAL

## 2023-07-25 PROCEDURE — 97530 THERAPEUTIC ACTIVITIES: CPT

## 2023-07-25 PROCEDURE — 92610 EVALUATE SWALLOWING FUNCTION: CPT

## 2023-07-25 PROCEDURE — 97116 GAIT TRAINING THERAPY: CPT

## 2023-07-25 PROCEDURE — 1180000000 HC REHAB R&B

## 2023-07-25 PROCEDURE — 6370000000 HC RX 637 (ALT 250 FOR IP): Performed by: PSYCHIATRY & NEUROLOGY

## 2023-07-25 PROCEDURE — 97535 SELF CARE MNGMENT TRAINING: CPT

## 2023-07-25 PROCEDURE — 82962 GLUCOSE BLOOD TEST: CPT

## 2023-07-25 PROCEDURE — 94760 N-INVAS EAR/PLS OXIMETRY 1: CPT

## 2023-07-25 PROCEDURE — 99233 SBSQ HOSP IP/OBS HIGH 50: CPT | Performed by: PSYCHIATRY & NEUROLOGY

## 2023-07-25 PROCEDURE — 97110 THERAPEUTIC EXERCISES: CPT

## 2023-07-25 PROCEDURE — 97129 THER IVNTJ 1ST 15 MIN: CPT

## 2023-07-25 PROCEDURE — 97130 THER IVNTJ EA ADDL 15 MIN: CPT

## 2023-07-25 RX ORDER — ALOGLIPTIN 6.25 MG/1
12.5 TABLET, FILM COATED ORAL DAILY
Status: DISCONTINUED | OUTPATIENT
Start: 2023-07-25 | End: 2023-08-03

## 2023-07-25 RX ADMIN — DIAZEPAM 10 MG: 5 TABLET ORAL at 04:40

## 2023-07-25 RX ADMIN — FLUOXETINE 20 MG: 20 CAPSULE ORAL at 07:47

## 2023-07-25 RX ADMIN — Medication 100 MG: at 07:47

## 2023-07-25 RX ADMIN — DIAZEPAM 10 MG: 5 TABLET ORAL at 20:44

## 2023-07-25 RX ADMIN — ATORVASTATIN CALCIUM 80 MG: 80 TABLET, FILM COATED ORAL at 20:44

## 2023-07-25 RX ADMIN — THERA TABS 1 TABLET: TAB at 07:46

## 2023-07-25 RX ADMIN — QUETIAPINE FUMARATE 25 MG: 25 TABLET ORAL at 20:44

## 2023-07-25 RX ADMIN — METOPROLOL SUCCINATE 100 MG: 50 TABLET, EXTENDED RELEASE ORAL at 07:47

## 2023-07-25 RX ADMIN — ALOGLIPTIN 12.5 MG: 6.25 TABLET, FILM COATED ORAL at 07:46

## 2023-07-25 RX ADMIN — AMLODIPINE BESYLATE 5 MG: 5 TABLET ORAL at 07:47

## 2023-07-25 RX ADMIN — ACETAMINOPHEN 650 MG: 325 TABLET ORAL at 04:39

## 2023-07-25 RX ADMIN — FENOFIBRATE 54 MG: 54 TABLET, FILM COATED ORAL at 07:47

## 2023-07-25 RX ADMIN — LISINOPRIL 40 MG: 20 TABLET ORAL at 07:46

## 2023-07-25 RX ADMIN — TRAZODONE HYDROCHLORIDE 150 MG: 50 TABLET ORAL at 20:44

## 2023-07-25 RX ADMIN — OXYBUTYNIN CHLORIDE 5 MG: 5 TABLET, EXTENDED RELEASE ORAL at 07:47

## 2023-07-25 RX ADMIN — QUETIAPINE FUMARATE 25 MG: 25 TABLET ORAL at 07:52

## 2023-07-25 ASSESSMENT — PAIN SCALES - GENERAL
PAINLEVEL_OUTOF10: 3
PAINLEVEL_OUTOF10: 8
PAINLEVEL_OUTOF10: 0

## 2023-07-25 ASSESSMENT — PAIN - FUNCTIONAL ASSESSMENT: PAIN_FUNCTIONAL_ASSESSMENT: ACTIVITIES ARE NOT PREVENTED

## 2023-07-25 ASSESSMENT — PAIN DESCRIPTION - DESCRIPTORS: DESCRIPTORS: ACHING

## 2023-07-25 ASSESSMENT — PAIN DESCRIPTION - LOCATION: LOCATION: HIP

## 2023-07-25 ASSESSMENT — PAIN DESCRIPTION - ORIENTATION: ORIENTATION: LEFT

## 2023-07-25 NOTE — PROGRESS NOTES
Recent Labs     07/22/23  0822 07/24/23  0257   BUN 20 23       Recent Labs     07/22/23  0822 07/24/23  0257   CREATININE 1.0 1.3*       Estimated Creatinine Clearance: 56 mL/min (A) (based on SCr of 1.3 mg/dL (H)).       Plan: Changed Gwinda Too from 25mg daily to 12.5mg daily due to decline in renal function    Electronically signed by Nic Avila, [facility] on 7/25/2023 at 1:53 AM
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
## 2023-07-25 NOTE — PLAN OF CARE
Problem: Discharge Planning  Goal: Discharge to home or other facility with appropriate resources  Outcome: Progressing     Problem: Safety - Adult  Goal: Free from fall injury  Outcome: Progressing     Problem: Skin/Tissue Integrity  Goal: Absence of new skin breakdown  Description: 1. Monitor for areas of redness and/or skin breakdown  2. Assess vascular access sites hourly  3. Every 4-6 hours minimum:  Change oxygen saturation probe site  4. Every 4-6 hours:  If on nasal continuous positive airway pressure, respiratory therapy assess nares and determine need for appliance change or resting period. Outcome: Progressing     Problem: Chronic Conditions and Co-morbidities  Goal: Patient's chronic conditions and co-morbidity symptoms are monitored and maintained or improved  Outcome: Progressing     Problem: Confusion  Goal: Confusion, delirium, dementia, or psychosis is improved or at baseline  Description: INTERVENTIONS:  1. Assess for possible contributors to thought disturbance, including medications, impaired vision or hearing, underlying metabolic abnormalities, dehydration, psychiatric diagnoses, and notify attending LIP  2. Doyle high risk fall precautions, as indicated  3. Provide frequent short contacts to provide reality reorientation, refocusing and direction  4. Decrease environmental stimuli, including noise as appropriate  5. Monitor and intervene to maintain adequate nutrition, hydration, elimination, sleep and activity  6. If unable to ensure safety without constant attention obtain sitter and review sitter guidelines with assigned personnel  7.  Initiate Psychosocial CNS and Spiritual Care consult, as indicated  Outcome: Progressing     Problem: Risk for Elopement  Goal: Patient will not exit the unit/facility without proper excort  Outcome: Progressing     Problem: Neurosensory - Adult  Goal: Achieves stable or improved neurological status  Outcome: Progressing  Goal: Absence of seizures  Outcome: Progressing  Goal: Remains free of injury related to seizures activity  Outcome: Progressing  Goal: Achieves maximal functionality and self care  Outcome: Progressing     Problem: Neurosensory - Adult  Goal: Absence of seizures  Outcome: Progressing     Problem: Skin/Tissue Integrity - Adult  Goal: Skin integrity remains intact  Outcome: Progressing  Goal: Incisions, wounds, or drain sites healing without S/S of infection  Outcome: Progressing  Goal: Oral mucous membranes remain intact  Outcome: Progressing
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
## 2023-07-25 NOTE — PLAN OF CARE
Problem: Safety - Adult  Goal: Free from fall injury  7/25/2023 0909 by Mayda Low RN  Outcome: Progressing  7/25/2023 0703 by Regino Beckwith LPN  Outcome: Progressing  7/25/2023 0048 by Regino Beckwith LPN  Outcome: Progressing   Up with 1 assist, remains impulsive
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
## 2023-07-26 LAB
GLUCOSE BLD-MCNC: 100 MG/DL (ref 70–99)
GLUCOSE BLD-MCNC: 130 MG/DL (ref 70–99)
GLUCOSE BLD-MCNC: 136 MG/DL (ref 70–99)
GLUCOSE BLD-MCNC: 93 MG/DL (ref 70–99)
PERFORMED ON: ABNORMAL
PERFORMED ON: NORMAL

## 2023-07-26 PROCEDURE — 97116 GAIT TRAINING THERAPY: CPT

## 2023-07-26 PROCEDURE — 92526 ORAL FUNCTION THERAPY: CPT

## 2023-07-26 PROCEDURE — 1180000000 HC REHAB R&B

## 2023-07-26 PROCEDURE — 97129 THER IVNTJ 1ST 15 MIN: CPT

## 2023-07-26 PROCEDURE — 6370000000 HC RX 637 (ALT 250 FOR IP): Performed by: PSYCHIATRY & NEUROLOGY

## 2023-07-26 PROCEDURE — 97530 THERAPEUTIC ACTIVITIES: CPT

## 2023-07-26 PROCEDURE — 94760 N-INVAS EAR/PLS OXIMETRY 1: CPT

## 2023-07-26 PROCEDURE — 82962 GLUCOSE BLOOD TEST: CPT

## 2023-07-26 PROCEDURE — 97110 THERAPEUTIC EXERCISES: CPT

## 2023-07-26 PROCEDURE — 97130 THER IVNTJ EA ADDL 15 MIN: CPT

## 2023-07-26 PROCEDURE — 99232 SBSQ HOSP IP/OBS MODERATE 35: CPT | Performed by: PSYCHIATRY & NEUROLOGY

## 2023-07-26 RX ADMIN — ALOGLIPTIN 12.5 MG: 6.25 TABLET, FILM COATED ORAL at 07:31

## 2023-07-26 RX ADMIN — FENOFIBRATE 54 MG: 54 TABLET, FILM COATED ORAL at 07:32

## 2023-07-26 RX ADMIN — AMLODIPINE BESYLATE 5 MG: 5 TABLET ORAL at 07:31

## 2023-07-26 RX ADMIN — ATORVASTATIN CALCIUM 80 MG: 80 TABLET, FILM COATED ORAL at 20:15

## 2023-07-26 RX ADMIN — FLUOXETINE 20 MG: 20 CAPSULE ORAL at 07:31

## 2023-07-26 RX ADMIN — QUETIAPINE FUMARATE 25 MG: 25 TABLET ORAL at 20:15

## 2023-07-26 RX ADMIN — METOPROLOL SUCCINATE 100 MG: 50 TABLET, EXTENDED RELEASE ORAL at 07:31

## 2023-07-26 RX ADMIN — LISINOPRIL 40 MG: 20 TABLET ORAL at 07:32

## 2023-07-26 RX ADMIN — TRAZODONE HYDROCHLORIDE 150 MG: 50 TABLET ORAL at 20:15

## 2023-07-26 RX ADMIN — OXYBUTYNIN CHLORIDE 5 MG: 5 TABLET, EXTENDED RELEASE ORAL at 07:32

## 2023-07-26 RX ADMIN — QUETIAPINE FUMARATE 25 MG: 25 TABLET ORAL at 07:32

## 2023-07-26 RX ADMIN — THERA TABS 1 TABLET: TAB at 07:31

## 2023-07-26 RX ADMIN — DIAZEPAM 10 MG: 5 TABLET ORAL at 20:16

## 2023-07-26 RX ADMIN — Medication 100 MG: at 07:31

## 2023-07-26 ASSESSMENT — PAIN SCALES - GENERAL: PAINLEVEL_OUTOF10: 0
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
## 2023-07-26 NOTE — PLAN OF CARE
Problem: Discharge Planning  Goal: Discharge to home or other facility with appropriate resources  Outcome: Progressing     Problem: Safety - Adult  Goal: Free from fall injury  Outcome: Progressing     Problem: Skin/Tissue Integrity  Goal: Absence of new skin breakdown  Description: 1. Monitor for areas of redness and/or skin breakdown  2. Assess vascular access sites hourly  3. Every 4-6 hours minimum:  Change oxygen saturation probe site  4. Every 4-6 hours:  If on nasal continuous positive airway pressure, respiratory therapy assess nares and determine need for appliance change or resting period. Outcome: Progressing     Problem: Chronic Conditions and Co-morbidities  Goal: Patient's chronic conditions and co-morbidity symptoms are monitored and maintained or improved  Outcome: Progressing     Problem: Confusion  Goal: Confusion, delirium, dementia, or psychosis is improved or at baseline  Description: INTERVENTIONS:  1. Assess for possible contributors to thought disturbance, including medications, impaired vision or hearing, underlying metabolic abnormalities, dehydration, psychiatric diagnoses, and notify attending LIP  2. [place] high risk fall precautions, as indicated  3. Provide frequent short contacts to provide reality reorientation, refocusing and direction  4. Decrease environmental stimuli, including noise as appropriate  5. Monitor and intervene to maintain adequate nutrition, hydration, elimination, sleep and activity  6. If unable to ensure safety without constant attention obtain sitter and review sitter guidelines with assigned personnel  7.  Initiate Psychosocial CNS and Spiritual Care consult, as indicated  Outcome: Progressing     Problem: Neurosensory - Adult  Goal: Achieves stable or improved neurological status  Outcome: Progressing  Goal: Absence of seizures  Outcome: Progressing  Goal: Remains free of injury related to seizures activity  Outcome: Progressing  Goal: Achieves

## 2023-07-27 LAB
ALBUMIN SERPL-MCNC: 4 G/DL (ref 3.5–5.2)
ALP SERPL-CCNC: 108 U/L (ref 40–130)
ALT SERPL-CCNC: 26 U/L (ref 5–41)
ANION GAP SERPL CALCULATED.3IONS-SCNC: 15 MMOL/L (ref 7–19)
AST SERPL-CCNC: 25 U/L (ref 5–40)
BASOPHILS # BLD: 0.1 K/UL (ref 0–0.2)
BASOPHILS NFR BLD: 1 % (ref 0–1)
BILIRUB SERPL-MCNC: <0.2 MG/DL (ref 0.2–1.2)
BUN SERPL-MCNC: 26 MG/DL (ref 8–23)
CALCIUM SERPL-MCNC: 9.6 MG/DL (ref 8.8–10.2)
CHLORIDE SERPL-SCNC: 104 MMOL/L (ref 98–111)
CO2 SERPL-SCNC: 24 MMOL/L (ref 22–29)
CREAT SERPL-MCNC: 1.3 MG/DL (ref 0.5–1.2)
EOSINOPHIL # BLD: 0.4 K/UL (ref 0–0.6)
EOSINOPHIL NFR BLD: 3.9 % (ref 0–5)
ERYTHROCYTE [DISTWIDTH] IN BLOOD BY AUTOMATED COUNT: 12.7 % (ref 11.5–14.5)
GLUCOSE BLD-MCNC: 122 MG/DL (ref 70–99)
GLUCOSE BLD-MCNC: 127 MG/DL (ref 70–99)
GLUCOSE BLD-MCNC: 146 MG/DL (ref 70–99)
GLUCOSE BLD-MCNC: 65 MG/DL (ref 70–99)
GLUCOSE BLD-MCNC: 74 MG/DL (ref 70–99)
GLUCOSE SERPL-MCNC: 109 MG/DL (ref 74–109)
HCT VFR BLD AUTO: 40.4 % (ref 42–52)
HGB BLD-MCNC: 13.6 G/DL (ref 14–18)
IMM GRANULOCYTES # BLD: 0.1 K/UL
LYMPHOCYTES # BLD: 2.4 K/UL (ref 1.1–4.5)
LYMPHOCYTES NFR BLD: 26.4 % (ref 20–40)
MCH RBC QN AUTO: 30 PG (ref 27–31)
MCHC RBC AUTO-ENTMCNC: 33.7 G/DL (ref 33–37)
MCV RBC AUTO: 89 FL (ref 80–94)
MONOCYTES # BLD: 1 K/UL (ref 0–0.9)
MONOCYTES NFR BLD: 10.3 % (ref 0–10)
NEUTROPHILS # BLD: 5.2 K/UL (ref 1.5–7.5)
NEUTS SEG NFR BLD: 57 % (ref 50–65)
PERFORMED ON: ABNORMAL
PERFORMED ON: NORMAL
PLATELET # BLD AUTO: 230 K/UL (ref 130–400)
PMV BLD AUTO: 10.3 FL (ref 9.4–12.4)
POTASSIUM SERPL-SCNC: 4.6 MMOL/L (ref 3.5–5)
PROT SERPL-MCNC: 6.2 G/DL (ref 6.6–8.7)
RBC # BLD AUTO: 4.54 M/UL (ref 4.7–6.1)
SODIUM SERPL-SCNC: 143 MMOL/L (ref 136–145)
WBC # BLD AUTO: 9.2 K/UL (ref 4.8–10.8)

## 2023-07-27 PROCEDURE — 85025 COMPLETE CBC W/AUTO DIFF WBC: CPT

## 2023-07-27 PROCEDURE — 97530 THERAPEUTIC ACTIVITIES: CPT

## 2023-07-27 PROCEDURE — 1180000000 HC REHAB R&B

## 2023-07-27 PROCEDURE — 94760 N-INVAS EAR/PLS OXIMETRY 1: CPT

## 2023-07-27 PROCEDURE — 97129 THER IVNTJ 1ST 15 MIN: CPT

## 2023-07-27 PROCEDURE — 6370000000 HC RX 637 (ALT 250 FOR IP): Performed by: PSYCHIATRY & NEUROLOGY

## 2023-07-27 PROCEDURE — 97110 THERAPEUTIC EXERCISES: CPT

## 2023-07-27 PROCEDURE — 97535 SELF CARE MNGMENT TRAINING: CPT

## 2023-07-27 PROCEDURE — 36415 COLL VENOUS BLD VENIPUNCTURE: CPT

## 2023-07-27 PROCEDURE — 97130 THER IVNTJ EA ADDL 15 MIN: CPT

## 2023-07-27 PROCEDURE — 80053 COMPREHEN METABOLIC PANEL: CPT

## 2023-07-27 PROCEDURE — 82962 GLUCOSE BLOOD TEST: CPT

## 2023-07-27 PROCEDURE — 97116 GAIT TRAINING THERAPY: CPT

## 2023-07-27 RX ADMIN — OXYBUTYNIN CHLORIDE 5 MG: 5 TABLET, EXTENDED RELEASE ORAL at 07:45

## 2023-07-27 RX ADMIN — Medication 100 MG: at 07:45

## 2023-07-27 RX ADMIN — FENOFIBRATE 54 MG: 54 TABLET, FILM COATED ORAL at 07:45

## 2023-07-27 RX ADMIN — DIAZEPAM 10 MG: 5 TABLET ORAL at 20:36

## 2023-07-27 RX ADMIN — QUETIAPINE FUMARATE 25 MG: 25 TABLET ORAL at 20:36

## 2023-07-27 RX ADMIN — LISINOPRIL 40 MG: 20 TABLET ORAL at 07:45

## 2023-07-27 RX ADMIN — FLUOXETINE 20 MG: 20 CAPSULE ORAL at 07:45

## 2023-07-27 RX ADMIN — QUETIAPINE FUMARATE 25 MG: 25 TABLET ORAL at 07:45

## 2023-07-27 RX ADMIN — ALOGLIPTIN 12.5 MG: 6.25 TABLET, FILM COATED ORAL at 07:44

## 2023-07-27 RX ADMIN — METOPROLOL SUCCINATE 100 MG: 50 TABLET, EXTENDED RELEASE ORAL at 07:45

## 2023-07-27 RX ADMIN — AMLODIPINE BESYLATE 5 MG: 5 TABLET ORAL at 07:45

## 2023-07-27 RX ADMIN — THERA TABS 1 TABLET: TAB at 07:45

## 2023-07-27 RX ADMIN — ATORVASTATIN CALCIUM 80 MG: 80 TABLET, FILM COATED ORAL at 20:36

## 2023-07-27 NOTE — BH NOTE
Psychiatric consult was placed for evaluation of mental competency. Mental competency is evaluated by court, not by a physician. However, evaluation of patient's capacity of making medical decisions can be performed by any physicians, not necessary by a psychiatrist.    [place] will be discontinued. Please, let us know if we can be helpful for any other reasons.
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
## 2023-07-27 NOTE — PLAN OF CARE
Problem: Discharge Planning  Goal: Discharge to home or other facility with appropriate resources  Outcome: Progressing     Problem: Safety - Adult  Goal: Free from fall injury  Outcome: Progressing     Problem: Skin/Tissue Integrity  Goal: Absence of new skin breakdown  Description: 1. Monitor for areas of redness and/or skin breakdown  2. Assess vascular access sites hourly  3. Every 4-6 hours minimum:  Change oxygen saturation probe site  4. Every 4-6 hours:  If on nasal continuous positive airway pressure, respiratory therapy assess nares and determine need for appliance change or resting period. Outcome: Progressing     Problem: Chronic Conditions and Co-morbidities  Goal: Patient's chronic conditions and co-morbidity symptoms are monitored and maintained or improved  Outcome: Progressing     Problem: Confusion  Goal: Confusion, delirium, dementia, or psychosis is improved or at baseline  Description: INTERVENTIONS:  1. Assess for possible contributors to thought disturbance, including medications, impaired vision or hearing, underlying metabolic abnormalities, dehydration, psychiatric diagnoses, and notify attending LIP  2. [place] high risk fall precautions, as indicated  3. Provide frequent short contacts to provide reality reorientation, refocusing and direction  4. Decrease environmental stimuli, including noise as appropriate  5. Monitor and intervene to maintain adequate nutrition, hydration, elimination, sleep and activity  6. If unable to ensure safety without constant attention obtain sitter and review sitter guidelines with assigned personnel  7.  Initiate Psychosocial CNS and Spiritual Care consult, as indicated  Outcome: Progressing
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
## 2023-07-27 NOTE — PROGRESS NOTES
Physical Therapy     07/27/23 0900   Restrictions/Precautions   Restrictions/Precautions Weight Bearing; Fall Risk;Seizure   Subjective   Subjective Pt laying in bed, agrees to participate in therapy. Subjective   Pain denies   Bed mobility   Rolling to Left Independent   Rolling to Right Independent   Supine to Sit Modified independent   Sit to Supine Modified independent   Scooting Modified independent   Bed Mobility Comments Use of L rail with HOB flat for supine to sit and scooting to EOB. Transfers   Sit to Stand Modified independent   Stand to Sit Contact guard assistance   Comment v. cuing to reach back before sitting at [facility]   Ambulation   Surface Level tile   Device No Device   Assistance Contact guard assistance;Minimal assistance   Quality of Gait weaving; reaching out to steady self frequently on handrail; wide ROZ; slight stagger   Gait Deviations Slow [place]; Increased ROZ; Decreased step length;Decreased step height;Decreased arm swing;Staggers; Deviated path   Distance 125'   Comments Multiple L/R turns   More Ambulation? Yes   Ambulation 2   Surface - 2 level tile   Device 2 No device   Assistance 2 Contact guard assistance;Minimal assistance   Quality of Gait 2 same as above   Gait Deviations Slow [place]; Increased ROZ;Staggers; Deviated path;Decreased step length;Decreased step height   Distance 100'   Comments multiple L/R turns with emphasis on posterior steps and  90 degree turns. Ambulation 3   Surface - 3 level tile   Device 3 Rolling Walker   Assistance 3 Stand by assistance   Quality of Gait 3 increased galo/decreased stagger/weaving with AD   Gait Deviations Decreased step height   Distance 150'   Comments Emphasis on use of AD during posterior steps/90 degree turns with no LOB CGA. Stairs/Curb   Stairs? Yes   Stairs   # Steps  12   Rails Bilateral   Assistance Contact guard assistance   Comment ascended reciprocally/descended with step to for increased stability/safety with no LOB.  1
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
## 2023-07-27 NOTE — PLAN OF CARE
Problem: Discharge Planning  Goal: Discharge to home or other facility with appropriate resources  7/27/2023 1106 by Constanza Suarez RN  Outcome: Progressing  Flowsheets (Taken 7/27/2023 0715)  Discharge to home or other facility with appropriate resources: Refer to discharge planning if patient needs post-hospital services based on physician order or complex needs related to functional status, cognitive ability or social support system  7/26/2023 2253 by Sandip Andrew LPN  Outcome: Progressing     Problem: Safety - Adult  Goal: Free from fall injury  7/27/2023 1106 by Constanza Suarez RN  Outcome: Progressing  7/26/2023 2253 by Sandip Andrew LPN  Outcome: Progressing     Problem: Skin/Tissue Integrity  Goal: Absence of new skin breakdown  Description: 1. Monitor for areas of redness and/or skin breakdown  2. Assess vascular access sites hourly  3. Every 4-6 hours minimum:  Change oxygen saturation probe site  4. Every 4-6 hours:  If on nasal continuous positive airway pressure, respiratory therapy assess nares and determine need for appliance change or resting period. 7/27/2023 1106 by Constanza Suarez RN  Outcome: Progressing  7/26/2023 2253 by Sandip Andrew LPN  Outcome: Progressing     Problem: Chronic Conditions and Co-morbidities  Goal: Patient's chronic conditions and co-morbidity symptoms are monitored and maintained or improved  7/27/2023 1106 by Constanza Suarez RN  Outcome: Progressing  Flowsheets (Taken 7/27/2023 0715)  Care Plan - Patient's Chronic Conditions and Co-Morbidity Symptoms are Monitored and Maintained or Improved: Monitor and assess patient's chronic conditions and comorbid symptoms for stability, deterioration, or improvement  7/26/2023 2253 by Sandip Andrew LPN  Outcome: Progressing     Problem: Confusion  Goal: Confusion, delirium, dementia, or psychosis is improved or at baseline  Description: INTERVENTIONS:  1.  Assess for possible contributors to thought disturbance, including medications, impaired vision or hearing, underlying metabolic abnormalities, dehydration, psychiatric diagnoses, and notify attending LIP  2. [place] high risk fall precautions, as indicated  3. Provide frequent short contacts to provide reality reorientation, refocusing and direction  4. Decrease environmental stimuli, including noise as appropriate  5. Monitor and intervene to maintain adequate nutrition, hydration, elimination, sleep and activity  6. If unable to ensure safety without constant attention obtain sitter and review sitter guidelines with assigned personnel  7.  Initiate Psychosocial CNS and Spiritual Care consult, as indicated  7/27/2023 1106 by Joshua Frias RN  Outcome: Progressing  7/26/2023 2253 by Rina Knowles LPN  Outcome: Progressing     Problem: Risk for Elopement  Goal: Patient will not exit the unit/facility without proper excort  7/27/2023 1106 by Joshua Frias RN  Outcome: Progressing  7/26/2023 2253 by Rnia Knowles LPN  Outcome: Progressing     Problem: Neurosensory - Adult  Goal: Achieves stable or improved neurological status  7/27/2023 1106 by Joshua Frias RN  Outcome: Progressing  Flowsheets (Taken 7/27/2023 0715)  Achieves stable or improved neurological status: Assess for and report changes in neurological status  7/26/2023 2253 by Rina Knowles LPN  Outcome: Progressing  Goal: Absence of seizures  7/27/2023 1106 by Joshua Frias RN  Outcome: Progressing  7/26/2023 2253 by Rina Knowles LPN  Outcome: Progressing  Goal: Remains free of injury related to seizures activity  7/27/2023 1106 by Joshua Frias RN  Outcome: [place] (Taken 7/27/2023 0715)  Remains free of injury related to seizure activity: Instruct patient/family to call for assistance with activity based on assessment  7/26/2023 2253 by Rina Knowles LPN  Outcome: Progressing  Goal: Achieves maximal functionality and self care  7/27/2023 1106 by Joshua Frias RN  Outcome: [place] Post

## 2023-07-28 LAB
GLUCOSE BLD-MCNC: 100 MG/DL (ref 70–99)
GLUCOSE BLD-MCNC: 101 MG/DL (ref 70–99)
GLUCOSE BLD-MCNC: 110 MG/DL (ref 70–99)
GLUCOSE BLD-MCNC: 123 MG/DL (ref 70–99)
PERFORMED ON: ABNORMAL

## 2023-07-28 PROCEDURE — 97116 GAIT TRAINING THERAPY: CPT

## 2023-07-28 PROCEDURE — 97130 THER IVNTJ EA ADDL 15 MIN: CPT

## 2023-07-28 PROCEDURE — 92526 ORAL FUNCTION THERAPY: CPT

## 2023-07-28 PROCEDURE — 97110 THERAPEUTIC EXERCISES: CPT

## 2023-07-28 PROCEDURE — 97129 THER IVNTJ 1ST 15 MIN: CPT

## 2023-07-28 PROCEDURE — 1180000000 HC REHAB R&B

## 2023-07-28 PROCEDURE — 82962 GLUCOSE BLOOD TEST: CPT

## 2023-07-28 PROCEDURE — 97530 THERAPEUTIC ACTIVITIES: CPT

## 2023-07-28 PROCEDURE — 6370000000 HC RX 637 (ALT 250 FOR IP): Performed by: PSYCHIATRY & NEUROLOGY

## 2023-07-28 RX ADMIN — Medication 100 MG: at 08:36

## 2023-07-28 RX ADMIN — METOPROLOL SUCCINATE 100 MG: 50 TABLET, EXTENDED RELEASE ORAL at 08:36

## 2023-07-28 RX ADMIN — LISINOPRIL 40 MG: 20 TABLET ORAL at 08:36

## 2023-07-28 RX ADMIN — OXYBUTYNIN CHLORIDE 5 MG: 5 TABLET, EXTENDED RELEASE ORAL at 08:36

## 2023-07-28 RX ADMIN — THERA TABS 1 TABLET: TAB at 08:36

## 2023-07-28 RX ADMIN — FLUOXETINE 20 MG: 20 CAPSULE ORAL at 08:36

## 2023-07-28 RX ADMIN — ATORVASTATIN CALCIUM 80 MG: 80 TABLET, FILM COATED ORAL at 20:37

## 2023-07-28 RX ADMIN — ALOGLIPTIN 12.5 MG: 6.25 TABLET, FILM COATED ORAL at 08:36

## 2023-07-28 RX ADMIN — DIAZEPAM 10 MG: 5 TABLET ORAL at 08:36

## 2023-07-28 RX ADMIN — FENOFIBRATE 54 MG: 54 TABLET, FILM COATED ORAL at 08:36

## 2023-07-28 RX ADMIN — QUETIAPINE FUMARATE 25 MG: 25 TABLET ORAL at 08:36

## 2023-07-28 RX ADMIN — QUETIAPINE FUMARATE 25 MG: 25 TABLET ORAL at 20:37

## 2023-07-28 RX ADMIN — AMLODIPINE BESYLATE 5 MG: 5 TABLET ORAL at 08:36

## 2023-07-28 NOTE — PROGRESS NOTES
[facility]  INPATIENT SPEECH THERAPY  [facility] 8 REHAB UNIT  TIME   1000  1100  61 MINUTES    [x]Daily Note  []Progress Note    Date: 2023  Patient Name: Elier Blevins        MRN: [id number]    Account #: [de-identified]  : 1955  (79 y.o.)  Gender: male   Primary Provider: Maria Elena Ritter MD  Swallowing Status/Diet:  Liquid Consistency Recommendation: Thin  Diet Solids Recommendation: Easy to Chew    Subjective: Patient alert, cooperative, and upright in recliner. No pain reported at this time. 2x sons present for tx. Objective:     Swallowing reassessed during tx. Patient did wear top and bottom dentures for swallow reassessment. Patient does prefer softer foods at baseline. Consistencies observed include regular solids with thin liquids. Patient did have dentures in place for trials. Oral prep reveals decreased vertical jaw movement with regular solids. Oral transit timing ranges from 1-2 seconds with regular solids and thin liquids. No significant residue observed with regular solids. Laryngeal movement observed to be consistently sluggish and mildly decreased for swallow airway protection. No overt s/s of aspiration/penetration observed with regular solids and thin liquids. Recommend continue easy to chew consistencies with thin liquids at this time. No difficulties reported with tasking medications whole with thin liquids. Decreased problem solving noted as patient reports he will get up out of bed independently. He states he knows he is not supposed to; however, he does get up by himself. Impulsivity noted. Did remind patient of safety rules/regulations while in our rehab facility. Patient verbalized understanding. Decreased problem solving/safety awareness noted. Sons are aware of this. Sons report patient demonstrated poor problem solving/safety awareness prior to recent hospitalization. Son reports patient was falling frequently. Son also reports patient would get lost while driving. from continued speech/language/cognitive therapy services. SLP will continue to follow and treat. Diet Solids Recommendation:  Diet Solids Recommendation: Easy to Chew  Diet Liquid Recommendation:  Liquid Consistency Recommendation: Thin  Compensatory Swallowing Strategies:  Compensatory Swallowing Strategies : Small bites/sips, Eat/Feed slowly, Alternate solids and liquids, Check for pocketing of food on the Left, Remain upright for 30-45 minutes after meals, Upright as possible for all oral intake, Lingual sweep    SHORT TERM GOAL #1:  Goal 1: Pt will complete memory tasks with 90% accuracy. SHORT TERM GOAL #2:  Goal 2: Pt will complete problem solving tasks with 90% accuracy. SHORT TERM GOAL #3:  Goal 3: Pt will complete verbal reasoning tasks with 90% accuracy. SHORT TERM GOAL #4:  Goal 4: Pt will compete judgement/safety tasks with 90% accuracy. Swallowing Short Term Goals  Short-term Goals  Goal 1: The patient will tolerate an easy to chew diet with thin liquids with minimal overt s/s of aspiration. Goal 2: The patient will follow swallowing precautions with minimal cues during meals. Goal 3: The patient will complete the jessi (tongue hold) technique to improve base of tongue retraction and base of tongue to pharyngeal wall approximation with 90% accuracy and minimal verbal cues. Goal 4: The patient will complete the effortful swallow maneuver to improve hyolaryngeal elevation, tongue base retraction, and vocal fold closure with 90% accuracy and minimal verbal cues.     Comprehension: 5 - Patient understands basic needs (hungry/hot/pain)  Expression: 5 - Expresses basic ideas/needs only (hungry/hot/pain)  Social Interaction: 4 - Patient appropriate 75-90%+ of the time  Problem Solvin - Patient solves simple/routine tasks 75-90%+   Memory: 3 - Patient remembers 50%-74% of the time    ASSESSMENT:  Assessment: [x]Progressing towards goals          []Not Progressing towards

## 2023-07-28 NOTE — PLAN OF CARE
Problem: Discharge Planning  Goal: Discharge to home or other facility with appropriate resources  7/27/2023 2132 by Rubi Hercules RN  Outcome: Progressing  Flowsheets (Taken 7/27/2023 2128)  Discharge to home or other facility with appropriate resources: Refer to discharge planning if patient needs post-hospital services based on physician order or complex needs related to functional status, cognitive ability or social support system  7/27/2023 1106 by Joshua Frias RN  Outcome: Progressing  Flowsheets (Taken 7/27/2023 0715)  Discharge to home or other facility with appropriate resources: Refer to discharge planning if patient needs post-hospital services based on physician order or complex needs related to functional status, cognitive ability or social support system     Problem: Safety - Adult  Goal: Free from fall injury  7/27/2023 2132 by Rubi Hercules RN  Outcome: Progressing  Flowsheets (Taken 7/27/2023 2131)  Free From Fall Injury: Instruct family/caregiver on patient safety  7/27/2023 1106 by Joshua Frias RN  Outcome: Progressing     Problem: Skin/Tissue Integrity  Goal: Absence of new skin breakdown  Description: 1. Monitor for areas of redness and/or skin breakdown  2. Assess vascular access sites hourly  3. Every 4-6 hours minimum:  Change oxygen saturation probe site  4. Every 4-6 hours:  If on nasal continuous positive airway pressure, respiratory therapy assess nares and determine need for appliance change or resting period.   7/27/2023 2132 by Rubi Hercules RN  Outcome: Progressing  7/27/2023 1106 by Joshua Frias RN  Outcome: Progressing
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
## 2023-07-28 NOTE — PROGRESS NOTES
Physical Therapy     07/28/23 1100   Restrictions/Precautions   Restrictions/Precautions Weight Bearing; Fall Risk;Seizure   Lower Extremity Weight Bearing Restrictions   Right Lower Extremity Weight Bearing Weight Bearing As Tolerated   Left Lower Extremity Weight Bearing Weight Bearing As Tolerated   Subjective   Subjective pt up in chair with sons present; agreed to tx   Subjective   Pain reported mild pain but did not rate. Transfers   Sit to Stand Stand by assistance   Stand to Sit Stand by assistance   Comment multiple STS to include: chair/ WC <> with and without RW with cuing to reach back prior to sitting for improved safety/control. Ambulation   Surface Level tile   Device No Device   Assistance Contact guard assistance   Quality of Gait weaving; reaching out to steady self frequently on handrail; wide ROZ; slight stagger   Gait Deviations Slow Galo; Increased ROZ; Decreased step length;Decreased step height;Decreased arm swing;Staggers; Deviated path   Distance 150'   Comments Multiple L/R turns   More Ambulation? Yes   Ambulation 2   Surface - 2 level tile   Device 2 Single point cane   Assistance 2 Contact guard assistance   Quality of Gait 2 unsteady, continue deviation/stagger to the L   Gait Deviations Slow Galo; Increased ROZ;Staggers; Deviated path;Decreased step length;Decreased step height   Distance 50'   Comments Trial with SPC due to pt resisistance to use of RW during AMB with pt unable to safely manage/use SPC with continuid instability and minor L LOB requiring Mara x 1 to correct. Ambulation 3   Surface - 3 level tile   Device 3 Rolling Walker   Assistance 3 Stand by assistance;Contact guard assistance   Quality of Gait 3 increased galo/decreased stagger/weaving with AD   Distance 76'   Comments Emphasis on L turns due to slight instability/ L side neglect noted. Stairs/Curb   Stairs?  Yes   Stairs   # Steps  12   Rails Bilateral   Assistance Contact guard assistance   Comment
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
## 2023-07-28 NOTE — PROGRESS NOTES
[facility]  INPATIENT SPEECH THERAPY  [facility] 8 [facility] UNIT  TIME   2189 [id number]  75 MINUTES    [x]Daily Note  []Progress Note    Date: 2023  Patient Name: Merline Hemming        MRN: [id number]    Account #: [de-identified]  : 1955  (79 y.o.)  Gender: male   Primary Provider: Kaye Sampson MD  Swallowing Status/Diet:  Liquid Consistency Recommendation: Thin  Diet Solids Recommendation: Easy to Chew    Subjective: Patient alert, cooperative, and upright in bed. No pain reported at this time. Objective:     Impulsivity noted as patient attempts to get out of bed to use the restroom independently. SLP did assist him with this task. Recall task completed. Patient is able to recall 3x items that he had for noon meal.     Sequencing/problem solving task completed. Patient is provided 4 steps of an ADL and is required to sequence the steps in the order in which they occur. Task completed with 80% accuracy. Minimal semantic cues required to complete this task. Problem solving task completed. Patient is provided a scenario. He is required to identify the problem and provide 2 solutions to the problem. Poor flexibility of thought observed with this task. Patient was able to identify each problem in a timely manner. Reasoning task completed. Patient is required to use clues to solve a puzzle. Delayed processing noted throughout this task; however, patient was able to solve the puzzle independently. Patient reports he forgot where he is going after his d/c from rehab unit.  did inform patient of the tentative plan this AM. Did remind patient of this. Patient verbalizes understanding. SLP will continue to follow and treat. Diet Solids Recommendation:  Diet Solids Recommendation: Easy to Chew  Diet Liquid Recommendation:  Liquid Consistency Recommendation:  Thin  Compensatory Swallowing Strategies:  Compensatory Swallowing Strategies : Small bites/sips, Eat/Feed slowly, Alternate solids and liquids, Check for pocketing of food on the Left, Remain upright for 30-45 minutes after meals, Upright as possible for all oral intake, Lingual sweep    SHORT TERM GOAL #1:  Goal 1: Pt will complete memory tasks with 90% accuracy. SHORT TERM GOAL #2:  Goal 2: Pt will complete problem solving tasks with 90% accuracy. SHORT TERM GOAL #3:  Goal 3: Pt will complete verbal reasoning tasks with 90% accuracy. SHORT TERM GOAL #4:  Goal 4: Pt will compete judgement/safety tasks with 90% accuracy. Swallowing Short Term Goals  Short-term Goals  Goal 1: The patient will tolerate an easy to chew diet with thin liquids with minimal overt s/s of aspiration. Goal 2: The patient will follow swallowing precautions with minimal cues during meals. Goal 3: The patient will complete the jessi (tongue hold) technique to improve base of tongue retraction and base of tongue to pharyngeal wall approximation with 90% accuracy and minimal verbal cues. Goal 4: The patient will complete the effortful swallow maneuver to improve hyolaryngeal elevation, tongue base retraction, and vocal fold closure with 90% accuracy and minimal verbal cues.     Comprehension: 5 - Patient understands basic needs (hungry/hot/pain)  Expression: 5 - Expresses basic ideas/needs only (hungry/hot/pain)  Social Interaction: 4 - Patient appropriate 75-90%+ of the time  Problem Solvin - Patient solves simple/routine tasks 75-90%+   Memory: 3 - Patient remembers 50%-74% of the time    ASSESSMENT:  Assessment: [x]Progressing towards goals          []Not Progressing towards goals    Patient Tolerance of Treatment:   [x]Tolerated well []Tolerated fair []Required rest breaks []Fatigued    Education:  Learner:  [x]Patient          []Significant Other          []Other  Education provided regarding:  [x]Goals and POC   []Diet and swallowing precautions    []Home Exercise Program  []Progress and/or discharge information  Method of Education:  [x]Discussion

## 2023-07-29 LAB
GLUCOSE BLD-MCNC: 100 MG/DL (ref 70–99)
GLUCOSE BLD-MCNC: 107 MG/DL (ref 70–99)
GLUCOSE BLD-MCNC: 118 MG/DL (ref 70–99)
GLUCOSE BLD-MCNC: 140 MG/DL (ref 70–99)
PERFORMED ON: ABNORMAL

## 2023-07-29 PROCEDURE — 82962 GLUCOSE BLOOD TEST: CPT

## 2023-07-29 PROCEDURE — 1180000000 HC REHAB R&B

## 2023-07-29 PROCEDURE — 6370000000 HC RX 637 (ALT 250 FOR IP): Performed by: PSYCHIATRY & NEUROLOGY

## 2023-07-29 RX ADMIN — QUETIAPINE FUMARATE 25 MG: 25 TABLET ORAL at 08:23

## 2023-07-29 RX ADMIN — ATORVASTATIN CALCIUM 80 MG: 80 TABLET, FILM COATED ORAL at 20:55

## 2023-07-29 RX ADMIN — LISINOPRIL 40 MG: 20 TABLET ORAL at 08:23

## 2023-07-29 RX ADMIN — ALOGLIPTIN 12.5 MG: 6.25 TABLET, FILM COATED ORAL at 08:23

## 2023-07-29 RX ADMIN — Medication 100 MG: at 08:23

## 2023-07-29 RX ADMIN — FLUOXETINE 20 MG: 20 CAPSULE ORAL at 08:23

## 2023-07-29 RX ADMIN — OXYBUTYNIN CHLORIDE 5 MG: 5 TABLET, EXTENDED RELEASE ORAL at 08:24

## 2023-07-29 RX ADMIN — AMLODIPINE BESYLATE 5 MG: 5 TABLET ORAL at 08:24

## 2023-07-29 RX ADMIN — QUETIAPINE FUMARATE 25 MG: 25 TABLET ORAL at 20:55

## 2023-07-29 RX ADMIN — THERA TABS 1 TABLET: TAB at 08:24

## 2023-07-29 RX ADMIN — FENOFIBRATE 54 MG: 54 TABLET, FILM COATED ORAL at 08:24

## 2023-07-29 RX ADMIN — DIAZEPAM 10 MG: 5 TABLET ORAL at 08:24

## 2023-07-29 RX ADMIN — METOPROLOL SUCCINATE 100 MG: 50 TABLET, EXTENDED RELEASE ORAL at 08:23

## 2023-07-29 NOTE — PLAN OF CARE
Problem: Discharge Planning  Goal: Discharge to home or other facility with appropriate resources  Outcome: Progressing     Problem: Safety - Adult  Goal: Free from fall injury  Outcome: Progressing     Problem: Skin/Tissue Integrity  Goal: Absence of new skin breakdown  Description: 1. Monitor for areas of redness and/or skin breakdown  2. Assess vascular access sites hourly  3. Every 4-6 hours minimum:  Change oxygen saturation probe site  4. Every 4-6 hours:  If on nasal continuous positive airway pressure, respiratory therapy assess nares and determine need for appliance change or resting period. Outcome: Progressing     Problem: Chronic Conditions and Co-morbidities  Goal: Patient's chronic conditions and co-morbidity symptoms are monitored and maintained or improved  Outcome: Progressing     Problem: Confusion  Goal: Confusion, delirium, dementia, or psychosis is improved or at baseline  Description: INTERVENTIONS:  1. Assess for possible contributors to thought disturbance, including medications, impaired vision or hearing, underlying metabolic abnormalities, dehydration, psychiatric diagnoses, and notify attending LIP  2. [place] high risk fall precautions, as indicated  3. Provide frequent short contacts to provide reality reorientation, refocusing and direction  4. Decrease environmental stimuli, including noise as appropriate  5. Monitor and intervene to maintain adequate nutrition, hydration, elimination, sleep and activity  6. If unable to ensure safety without constant attention obtain sitter and review sitter guidelines with assigned personnel  7.  Initiate Psychosocial CNS and Spiritual Care consult, as indicated  Outcome: Progressing     Problem: Risk for Elopement  Goal: Patient will not exit the unit/facility without proper excort  Outcome: Progressing     Problem: Neurosensory - Adult  Goal: Achieves stable or improved neurological status  Outcome: Progressing  Goal: Absence of seizures  Outcome: Progressing  Goal: Remains free of injury related to seizures activity  Outcome: Progressing  Goal: Achieves maximal functionality and self care  Outcome: Progressing     Problem: Skin/Tissue Integrity - Adult  Goal: Skin integrity remains intact  Outcome: Progressing  Goal: Incisions, wounds, or drain sites healing without S/S of infection  Outcome: Progressing  Goal: Oral mucous membranes remain intact  Outcome: Progressing     Problem: Pain  Goal: Verbalizes/displays adequate comfort level or baseline comfort level  Outcome: Progressing     Problem: Musculoskeletal - Adult  Goal: Return mobility to safest level of function  Outcome: Progressing  Goal: Return ADL status to a safe level of function  Outcome: Progressing     Problem: Genitourinary - Adult  Goal: Absence of urinary retention  Outcome: Progressing  Goal: Urinary catheter remains patent  Outcome: Progressing       Electronically signed by Edwin Lynch.  Jaron Fried on 7/29/2023 at 5:38 PM

## 2023-07-30 LAB
GLUCOSE BLD-MCNC: 97 MG/DL (ref 70–99)
PERFORMED ON: NORMAL

## 2023-07-30 PROCEDURE — 82962 GLUCOSE BLOOD TEST: CPT

## 2023-07-30 PROCEDURE — 1180000000 HC REHAB R&B

## 2023-07-30 PROCEDURE — 6370000000 HC RX 637 (ALT 250 FOR IP): Performed by: PSYCHIATRY & NEUROLOGY

## 2023-07-30 RX ADMIN — Medication 100 MG: at 08:05

## 2023-07-30 RX ADMIN — QUETIAPINE FUMARATE 25 MG: 25 TABLET ORAL at 08:04

## 2023-07-30 RX ADMIN — QUETIAPINE FUMARATE 25 MG: 25 TABLET ORAL at 20:19

## 2023-07-30 RX ADMIN — METOPROLOL SUCCINATE 100 MG: 50 TABLET, EXTENDED RELEASE ORAL at 08:05

## 2023-07-30 RX ADMIN — ALOGLIPTIN 12.5 MG: 6.25 TABLET, FILM COATED ORAL at 08:04

## 2023-07-30 RX ADMIN — DIAZEPAM 10 MG: 5 TABLET ORAL at 08:05

## 2023-07-30 RX ADMIN — FLUOXETINE 20 MG: 20 CAPSULE ORAL at 08:05

## 2023-07-30 RX ADMIN — THERA TABS 1 TABLET: TAB at 08:04

## 2023-07-30 RX ADMIN — FENOFIBRATE 54 MG: 54 TABLET, FILM COATED ORAL at 08:04

## 2023-07-30 RX ADMIN — AMLODIPINE BESYLATE 5 MG: 5 TABLET ORAL at 08:04

## 2023-07-30 RX ADMIN — LISINOPRIL 40 MG: 20 TABLET ORAL at 08:04

## 2023-07-30 RX ADMIN — ATORVASTATIN CALCIUM 80 MG: 80 TABLET, FILM COATED ORAL at 20:19

## 2023-07-30 RX ADMIN — OXYBUTYNIN CHLORIDE 5 MG: 5 TABLET, EXTENDED RELEASE ORAL at 08:05

## 2023-07-30 NOTE — PLAN OF CARE
Problem: Discharge Planning  Goal: Discharge to home or other facility with appropriate resources  Outcome: Progressing     Problem: Safety - Adult  Goal: Free from fall injury  Outcome: Progressing     Problem: Skin/Tissue Integrity  Goal: Absence of new skin breakdown  Description: 1. Monitor for areas of redness and/or skin breakdown  2. Assess vascular access sites hourly  3. Every 4-6 hours minimum:  Change oxygen saturation probe site  4. Every 4-6 hours:  If on nasal continuous positive airway pressure, respiratory therapy assess nares and determine need for appliance change or resting period. Outcome: Progressing     Problem: Chronic Conditions and Co-morbidities  Goal: Patient's chronic conditions and co-morbidity symptoms are monitored and maintained or improved  Outcome: Progressing     Problem: Confusion  Goal: Confusion, delirium, dementia, or psychosis is improved or at baseline  Description: INTERVENTIONS:  1. Assess for possible contributors to thought disturbance, including medications, impaired vision or hearing, underlying metabolic abnormalities, dehydration, psychiatric diagnoses, and notify attending LIP  2. [place] high risk fall precautions, as indicated  3. Provide frequent short contacts to provide reality reorientation, refocusing and direction  4. Decrease environmental stimuli, including noise as appropriate  5. Monitor and intervene to maintain adequate nutrition, hydration, elimination, sleep and activity  6. If unable to ensure safety without constant attention obtain sitter and review sitter guidelines with assigned personnel  7.  Initiate Psychosocial CNS and Spiritual Care consult, as indicated  Outcome: Progressing     Problem: Risk for Elopement  Goal: Patient will not exit the unit/facility without proper excort  Outcome: Progressing     Problem: Neurosensory - Adult  Goal: Achieves stable or improved neurological status  Outcome: Progressing  Goal: Absence of

## 2023-07-31 LAB
ALBUMIN SERPL-MCNC: 3.7 G/DL (ref 3.5–5.2)
ALP SERPL-CCNC: 106 U/L (ref 40–130)
ALT SERPL-CCNC: 27 U/L (ref 5–41)
ANION GAP SERPL CALCULATED.3IONS-SCNC: 12 MMOL/L (ref 7–19)
AST SERPL-CCNC: 33 U/L (ref 5–40)
BASOPHILS # BLD: 0.1 K/UL (ref 0–0.2)
BASOPHILS NFR BLD: 1.2 % (ref 0–1)
BILIRUB SERPL-MCNC: 0.3 MG/DL (ref 0.2–1.2)
BUN SERPL-MCNC: 25 MG/DL (ref 8–23)
CALCIUM SERPL-MCNC: 9.3 MG/DL (ref 8.8–10.2)
CHLORIDE SERPL-SCNC: 103 MMOL/L (ref 98–111)
CO2 SERPL-SCNC: 25 MMOL/L (ref 22–29)
CREAT SERPL-MCNC: 1.3 MG/DL (ref 0.5–1.2)
EOSINOPHIL # BLD: 0.3 K/UL (ref 0–0.6)
EOSINOPHIL NFR BLD: 3.2 % (ref 0–5)
ERYTHROCYTE [DISTWIDTH] IN BLOOD BY AUTOMATED COUNT: 12.6 % (ref 11.5–14.5)
GLUCOSE BLD-MCNC: 100 MG/DL (ref 70–99)
GLUCOSE SERPL-MCNC: 87 MG/DL (ref 74–109)
HCT VFR BLD AUTO: 43.3 % (ref 42–52)
HGB BLD-MCNC: 14.5 G/DL (ref 14–18)
IMM GRANULOCYTES # BLD: 0.1 K/UL
LYMPHOCYTES # BLD: 2.7 K/UL (ref 1.1–4.5)
LYMPHOCYTES NFR BLD: 28.5 % (ref 20–40)
MCH RBC QN AUTO: 29.8 PG (ref 27–31)
MCHC RBC AUTO-ENTMCNC: 33.5 G/DL (ref 33–37)
MCV RBC AUTO: 89.1 FL (ref 80–94)
MONOCYTES # BLD: 1 K/UL (ref 0–0.9)
MONOCYTES NFR BLD: 10.4 % (ref 0–10)
NEUTROPHILS # BLD: 5.2 K/UL (ref 1.5–7.5)
NEUTS SEG NFR BLD: 55.2 % (ref 50–65)
PERFORMED ON: ABNORMAL
PLATELET # BLD AUTO: 271 K/UL (ref 130–400)
PMV BLD AUTO: 10.2 FL (ref 9.4–12.4)
POTASSIUM SERPL-SCNC: 4.7 MMOL/L (ref 3.5–5)
PROT SERPL-MCNC: 6.7 G/DL (ref 6.6–8.7)
RBC # BLD AUTO: 4.86 M/UL (ref 4.7–6.1)
SODIUM SERPL-SCNC: 140 MMOL/L (ref 136–145)
WBC # BLD AUTO: 9.5 K/UL (ref 4.8–10.8)

## 2023-07-31 PROCEDURE — 36415 COLL VENOUS BLD VENIPUNCTURE: CPT

## 2023-07-31 PROCEDURE — 82962 GLUCOSE BLOOD TEST: CPT

## 2023-07-31 PROCEDURE — 85025 COMPLETE CBC W/AUTO DIFF WBC: CPT

## 2023-07-31 PROCEDURE — 1180000000 HC REHAB R&B

## 2023-07-31 PROCEDURE — 99232 SBSQ HOSP IP/OBS MODERATE 35: CPT | Performed by: PSYCHIATRY & NEUROLOGY

## 2023-07-31 PROCEDURE — 97129 THER IVNTJ 1ST 15 MIN: CPT

## 2023-07-31 PROCEDURE — 6370000000 HC RX 637 (ALT 250 FOR IP): Performed by: PSYCHIATRY & NEUROLOGY

## 2023-07-31 PROCEDURE — 97130 THER IVNTJ EA ADDL 15 MIN: CPT

## 2023-07-31 PROCEDURE — 97116 GAIT TRAINING THERAPY: CPT

## 2023-07-31 PROCEDURE — 94760 N-INVAS EAR/PLS OXIMETRY 1: CPT

## 2023-07-31 PROCEDURE — 97110 THERAPEUTIC EXERCISES: CPT

## 2023-07-31 PROCEDURE — 97530 THERAPEUTIC ACTIVITIES: CPT

## 2023-07-31 PROCEDURE — 80053 COMPREHEN METABOLIC PANEL: CPT

## 2023-07-31 RX ADMIN — AMLODIPINE BESYLATE 5 MG: 5 TABLET ORAL at 08:40

## 2023-07-31 RX ADMIN — Medication 100 MG: at 08:40

## 2023-07-31 RX ADMIN — FENOFIBRATE 54 MG: 54 TABLET, FILM COATED ORAL at 08:40

## 2023-07-31 RX ADMIN — ATORVASTATIN CALCIUM 80 MG: 80 TABLET, FILM COATED ORAL at 20:38

## 2023-07-31 RX ADMIN — DIAZEPAM 10 MG: 5 TABLET ORAL at 20:38

## 2023-07-31 RX ADMIN — LISINOPRIL 40 MG: 20 TABLET ORAL at 08:40

## 2023-07-31 RX ADMIN — ALOGLIPTIN 12.5 MG: 6.25 TABLET, FILM COATED ORAL at 08:40

## 2023-07-31 RX ADMIN — METOPROLOL SUCCINATE 100 MG: 50 TABLET, EXTENDED RELEASE ORAL at 08:40

## 2023-07-31 RX ADMIN — QUETIAPINE FUMARATE 25 MG: 25 TABLET ORAL at 20:38

## 2023-07-31 RX ADMIN — QUETIAPINE FUMARATE 25 MG: 25 TABLET ORAL at 08:40

## 2023-07-31 RX ADMIN — OXYBUTYNIN CHLORIDE 5 MG: 5 TABLET, EXTENDED RELEASE ORAL at 08:40

## 2023-07-31 RX ADMIN — THERA TABS 1 TABLET: TAB at 08:40

## 2023-07-31 RX ADMIN — FLUOXETINE 20 MG: 20 CAPSULE ORAL at 08:40
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
## 2023-07-31 NOTE — PROGRESS NOTES
Nutrition Assessment     Type and Reason for Visit: Reassess    Nutrition Recommendations/Plan:   Continue POC. Malnutrition Assessment:  Malnutrition Status: No malnutrition    Nutrition Assessment:  PO intake remains >75% of meals. Current wt indicates 11lb loss over 6 months. BG remains well-controlled at <150mg/dL. No nutritional needs identified at this time.     Estimated Daily Nutrient Needs:  Energy (kcal):  8119-1486 kcals (20-25 kcals/kg) Weight Used for Energy Requirements: Current     Protein (g):  94-145g Weight Used for Protein Requirements: Ideal        Fluid (ml/day):  6614-2572 ml Method Used for Fluid Requirements: 1 ml/kcal    Nutrition Related Findings:     Wound Type: None (rash, abrasion)    Current Nutrition Therapies:    ADULT DIET; Easy to Chew; 4 carb choices (60 gm/meal)    Anthropometric Measures:  Height: 5' 9\" (175.3 cm)  Current Body Wt: 175 lb 4.8 oz (79.5 kg)   BMI: 25.9    Nutrition Diagnosis:   Biting/chewing (masticatory) difficulty related to acute injury/trauma as evidenced by swallow study results    Nutrition Interventions:   Food and/or Nutrient Delivery: Continue Current Diet  Nutrition Education/Counseling: No recommendation at this time  Coordination of Nutrition Care: Continue to monitor while inpatient, Speech Therapy       Goals:  Previous Goal Met: Goal(s) Achieved  Goals: Meet at least 75% of estimated needs, PO intake 50% or greater       Nutrition Monitoring and Evaluation:   Behavioral-Environmental Outcomes: None Identified  Food/Nutrient Intake Outcomes: Food and Nutrient Intake  Physical Signs/Symptoms Outcomes: Biochemical Data, Chewing or Swallowing, Fluid Status or Edema, Weight, Skin    Discharge Planning:    No discharge needs at this time     Orville Wagoner, MS, RD, LD, [facility]  Contact: [phone]

## 2023-07-31 NOTE — PROGRESS NOTES
Physical Therapy  Name: Anjelica Garg  MRN:  [id number]  Date of service:  7/31/2023 07/31/23 1000   Restrictions/Precautions   Restrictions/Precautions Fall Risk   Lower Extremity Weight Bearing Restrictions   Right Lower Extremity Weight Bearing Weight Bearing As Tolerated   Left Lower Extremity Weight Bearing Weight Bearing As Tolerated   General Comment   Comments napping in bed   Subjective   Subjective Pt agreeable and quick to rise. No complaints other than desire to go home. Subjective   Subjective denies   Ambulation   Surface Level tile   Device Rolling Walker   Assistance Stand by assistance   Quality of Gait deivation of path, steady pace   Gait Deviations Decreased step height;Decreased step length;Deviated path   Distance 150'   Ambulation 2   Surface - 2 level tile   Device 2 Rolling Walker   Assistance 2 Stand by assistance   Quality of Gait 2 deviation of path, steady pace   Gait Deviations Deviated path;Decreased step length;Decreased step height   Distance 500'   Comments cues for obstacle management in hallway (ie rug); RW seems to correct issues with path management   Ambulation 3   Surface - 3 level tile   Device 3 Rolling Walker   Assistance 3 Stand by assistance   Quality of Gait 3 as above   Gait Deviations Decreased step length;Decreased step height;Deviated path   Distance 500'   Comments cone retrieval x 11 radomized placement L and R with overhead to ankle ht variance (good positioning of self to target prior to reaching)   Stairs/Curb   Stairs?  Yes   Stairs   # Steps  14   Rails Right ascending  (one handed rail use for greater challenge, pt alternated rail used)   Assistance Contact guard assistance;Stand by assistance   Comment pt used reciprocal pattern, tendency to not use entire tread for wbing (distal foot hanging off edge which could lead to LOB)   PT Exercises   Exercise Treatment STS x 10 in quick succession without use of UE's   Static Standing Balance Exercises balloon
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
## 2023-08-01 LAB
GLUCOSE BLD-MCNC: 117 MG/DL (ref 70–99)
PERFORMED ON: ABNORMAL

## 2023-08-01 PROCEDURE — 6370000000 HC RX 637 (ALT 250 FOR IP): Performed by: PSYCHIATRY & NEUROLOGY

## 2023-08-01 PROCEDURE — 97130 THER IVNTJ EA ADDL 15 MIN: CPT

## 2023-08-01 PROCEDURE — 97110 THERAPEUTIC EXERCISES: CPT

## 2023-08-01 PROCEDURE — 94760 N-INVAS EAR/PLS OXIMETRY 1: CPT

## 2023-08-01 PROCEDURE — 82962 GLUCOSE BLOOD TEST: CPT

## 2023-08-01 PROCEDURE — 97129 THER IVNTJ 1ST 15 MIN: CPT

## 2023-08-01 PROCEDURE — 1180000000 HC REHAB R&B

## 2023-08-01 PROCEDURE — 97530 THERAPEUTIC ACTIVITIES: CPT

## 2023-08-01 PROCEDURE — 99233 SBSQ HOSP IP/OBS HIGH 50: CPT | Performed by: PSYCHIATRY & NEUROLOGY

## 2023-08-01 RX ADMIN — Medication 100 MG: at 07:32

## 2023-08-01 RX ADMIN — THERA TABS 1 TABLET: TAB at 07:33

## 2023-08-01 RX ADMIN — ALOGLIPTIN 12.5 MG: 6.25 TABLET, FILM COATED ORAL at 07:32

## 2023-08-01 RX ADMIN — AMLODIPINE BESYLATE 5 MG: 5 TABLET ORAL at 07:33

## 2023-08-01 RX ADMIN — OXYBUTYNIN CHLORIDE 5 MG: 5 TABLET, EXTENDED RELEASE ORAL at 07:32

## 2023-08-01 RX ADMIN — FLUOXETINE 20 MG: 20 CAPSULE ORAL at 07:33

## 2023-08-01 RX ADMIN — LISINOPRIL 40 MG: 20 TABLET ORAL at 07:33

## 2023-08-01 RX ADMIN — TRAZODONE HYDROCHLORIDE 150 MG: 50 TABLET ORAL at 20:23

## 2023-08-01 RX ADMIN — DIAZEPAM 10 MG: 5 TABLET ORAL at 20:26

## 2023-08-01 RX ADMIN — METOPROLOL SUCCINATE 100 MG: 50 TABLET, EXTENDED RELEASE ORAL at 07:33

## 2023-08-01 RX ADMIN — QUETIAPINE FUMARATE 25 MG: 25 TABLET ORAL at 07:32

## 2023-08-01 RX ADMIN — QUETIAPINE FUMARATE 25 MG: 25 TABLET ORAL at 20:23

## 2023-08-01 RX ADMIN — FENOFIBRATE 54 MG: 54 TABLET, FILM COATED ORAL at 07:33

## 2023-08-01 RX ADMIN — ATORVASTATIN CALCIUM 80 MG: 80 TABLET, FILM COATED ORAL at 20:23
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
## 2023-08-01 NOTE — PROGRESS NOTES
Physical Therapy  Name: Melisa Perdomo  MRN:  [id number]  Date of service:  8/1/2023 08/01/23 1430   Restrictions/Precautions   Restrictions/Precautions Fall Risk   Lower Extremity Weight Bearing Restrictions   Right Lower Extremity Weight Bearing Weight Bearing As Tolerated   Left Lower Extremity Weight Bearing Weight Bearing As Tolerated   General Comment   Comments just finishing OT   Subjective   Subjective Pt agreeable and without complaint. Lizbethjohanny Cerda to go home. Subjective   Subjective denies   Transfers   Sit to Stand Independent  (wide shira)   Stand to Sit Independent   Bed to Chair Supervision; Independent   Ambulation   Surface Level tile   Device No Device   Assistance Stand by assistance   Quality of Gait no deviation of path, reciprocal pattern with good stability   Distance 300' x 2   Ambulation 2   Surface - 2 outdoors;uneven;ramp  (varied surfaces sidewalks, pavers, grass)   Device 2 No device   Assistance 2 Stand by assistance   Quality of Gait 2 little to no deviation of path, occ too close proximity to edge of curb with cues to get more to center of walk to avoid drop off   Gait Deviations Slow Mahsa   Distance 700' and 300'   Ambulation 3   Surface - 3 level tile   Device 3 No device  (ball toss while moving randomized directions and sides without lob or dropping ball, occ deviation of path seeking ball but not uncontrolled)   Assistance 3 Stand by assistance   Distance 700'   Stairs   # Steps  6   Stairs Height 8\"  (outdoors)   Rails Right ascending   Assistance Contact guard assistance;Stand by assistance   Comment rciprocal pattern up and down with good confidence and pace   PT Exercises   Exercise Treatment STS x 10 in quick succession without use of UE's (seated edge of low mat table, narrowed shira)   Motor Control/Coordination x 10 L/R lead 4\" step ups w/o UE support (cg@) and cues to use entirity of step tread to improve stability   Exercise Equipment amb in room in/out of BR (carrying denture
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
## 2023-08-01 NOTE — PLAN OF CARE
Problem: Discharge Planning  Goal: Discharge to home or other facility with appropriate resources  7/31/2023 2240 by Rickye Brito LPN  Outcome: Progressing  7/31/2023 0954 by Haresh Nunes RN  Outcome: Progressing     Problem: Safety - Adult  Goal: Free from fall injury  7/31/2023 2240 by Rickey Brito LPN  Outcome: Progressing  7/31/2023 0954 by Haresh Nunes RN  Outcome: Progressing     Problem: Skin/Tissue Integrity  Goal: Absence of new skin breakdown  Description: 1. Monitor for areas of redness and/or skin breakdown  2. Assess vascular access sites hourly  3. Every 4-6 hours minimum:  Change oxygen saturation probe site  4. Every 4-6 hours:  If on nasal continuous positive airway pressure, respiratory therapy assess nares and determine need for appliance change or resting period. 7/31/2023 2240 by Rickey Brito LPN  Outcome: Progressing  7/31/2023 0954 by Haresh Nunes RN  Outcome: Progressing     Problem: Chronic Conditions and Co-morbidities  Goal: Patient's chronic conditions and co-morbidity symptoms are monitored and maintained or improved  7/31/2023 2240 by Rickey Brito LPN  Outcome: Progressing  7/31/2023 0954 by Haresh Nunes RN  Outcome: Progressing     Problem: Confusion  Goal: Confusion, delirium, dementia, or psychosis is improved or at baseline  Description: INTERVENTIONS:  1. Assess for possible contributors to thought disturbance, including medications, impaired vision or hearing, underlying metabolic abnormalities, dehydration, psychiatric diagnoses, and notify attending LIP  2. [place] high risk fall precautions, as indicated  3. Provide frequent short contacts to provide reality reorientation, refocusing and direction  4. Decrease environmental stimuli, including noise as appropriate  5. Monitor and intervene to maintain adequate nutrition, hydration, elimination, sleep and activity  6.  If unable to ensure safety without constant attention obtain sitter and review sitter guidelines with assigned personnel  7.  Initiate Psychosocial CNS and Spiritual Care consult, as indicated  7/31/2023 2240 by Mychal Pearson LPN  Outcome: Progressing  7/31/2023 0954 by Michelle Sanchez RN  Outcome: Progressing     Problem: Risk for Elopement  Goal: Patient will not exit the unit/facility without proper excort  7/31/2023 2240 by Mychal Pearson LPN  Outcome: Progressing  7/31/2023 0954 by Michelle Sanchez RN  Outcome: Progressing     Problem: Skin/Tissue Integrity - Adult  Goal: Skin integrity remains intact  7/31/2023 2240 by Mychal Pearson LPN  Outcome: Progressing  7/31/2023 0954 by Michelle Sanchez RN  Outcome: Progressing  Goal: Incisions, wounds, or drain sites healing without S/S of infection  7/31/2023 2240 by Mychal Pearson LPN  Outcome: Progressing  7/31/2023 0954 by Michelle Sanchez RN  Outcome: Progressing  Goal: Oral mucous membranes remain intact  7/31/2023 2240 by Mychal Pearson LPN  Outcome: Progressing  7/31/2023 0954 by Michelle Sanchez RN  Outcome: Progressing     Problem: Pain  Goal: Verbalizes/displays adequate comfort level or baseline comfort level  7/31/2023 2240 by Mychal Pearson LPN  Outcome: Progressing  7/31/2023 0954 by Michelle Sanchez RN  Outcome: Progressing     Problem: Pain  Goal: Verbalizes/displays adequate comfort level or baseline comfort level  7/31/2023 2240 by Mychal Pearson LPN  Outcome: Progressing  7/31/2023 0954 by Michelle Sanchez RN  Outcome: Progressing
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
## 2023-08-02 PROCEDURE — 97530 THERAPEUTIC ACTIVITIES: CPT

## 2023-08-02 PROCEDURE — 6370000000 HC RX 637 (ALT 250 FOR IP): Performed by: PSYCHIATRY & NEUROLOGY

## 2023-08-02 PROCEDURE — 1180000000 HC REHAB R&B

## 2023-08-02 PROCEDURE — 97116 GAIT TRAINING THERAPY: CPT

## 2023-08-02 PROCEDURE — 94760 N-INVAS EAR/PLS OXIMETRY 1: CPT

## 2023-08-02 PROCEDURE — 97129 THER IVNTJ 1ST 15 MIN: CPT

## 2023-08-02 PROCEDURE — 97110 THERAPEUTIC EXERCISES: CPT

## 2023-08-02 PROCEDURE — 97130 THER IVNTJ EA ADDL 15 MIN: CPT

## 2023-08-02 PROCEDURE — 97535 SELF CARE MNGMENT TRAINING: CPT

## 2023-08-02 PROCEDURE — 99232 SBSQ HOSP IP/OBS MODERATE 35: CPT | Performed by: PSYCHIATRY & NEUROLOGY

## 2023-08-02 RX ADMIN — LISINOPRIL 40 MG: 20 TABLET ORAL at 08:15

## 2023-08-02 RX ADMIN — QUETIAPINE FUMARATE 25 MG: 25 TABLET ORAL at 21:07

## 2023-08-02 RX ADMIN — DIAZEPAM 10 MG: 5 TABLET ORAL at 08:15

## 2023-08-02 RX ADMIN — METOPROLOL SUCCINATE 100 MG: 50 TABLET, EXTENDED RELEASE ORAL at 08:15

## 2023-08-02 RX ADMIN — THERA TABS 1 TABLET: TAB at 08:15

## 2023-08-02 RX ADMIN — AMLODIPINE BESYLATE 5 MG: 5 TABLET ORAL at 08:15

## 2023-08-02 RX ADMIN — FLUOXETINE 20 MG: 20 CAPSULE ORAL at 08:15

## 2023-08-02 RX ADMIN — FENOFIBRATE 54 MG: 54 TABLET, FILM COATED ORAL at 08:15

## 2023-08-02 RX ADMIN — DIAZEPAM 10 MG: 5 TABLET ORAL at 17:00

## 2023-08-02 RX ADMIN — ALOGLIPTIN 12.5 MG: 6.25 TABLET, FILM COATED ORAL at 08:15

## 2023-08-02 RX ADMIN — Medication 100 MG: at 08:15

## 2023-08-02 RX ADMIN — QUETIAPINE FUMARATE 25 MG: 25 TABLET ORAL at 08:15

## 2023-08-02 RX ADMIN — ATORVASTATIN CALCIUM 80 MG: 80 TABLET, FILM COATED ORAL at 21:07

## 2023-08-02 RX ADMIN — OXYBUTYNIN CHLORIDE 5 MG: 5 TABLET, EXTENDED RELEASE ORAL at 08:15
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
## 2023-08-02 NOTE — PROGRESS NOTES
[facility]  INPATIENT SPEECH THERAPY  [facility] 8 [facility] UNIT  TIME   5988  8468  08 MINUTES    [x]Daily Note  []Progress Note    Date: 2023  Patient Name: Nancy Gilliland        MRN: [id number]    Account #: [de-identified]  : 1955  (79 y.o.)  Gender: male   Primary Provider: Salomón Zabala MD  Swallowing Status/Diet:  Liquid Consistency Recommendation: Thin  Diet Solids Recommendation: Easy to Chew    Subjective: Patient alert, cooperative, and upright in bed. No pain reported at this time. Objective:     No difficulties reported with swallowing solids, liquids, and medications at this time. Swallowing reassessed during tx. Consistencies observed include regular solids with thin liquids. Patient did not have dentures in place for trials. Oral prep reveals decreased vertical jaw movement with regular solids. Oral transit timing ranges from 1-2 seconds with regular solids and thin liquids. No significant residue observed with regular solids. Laryngeal movement observed to be consistently sluggish and mildly decreased for swallow airway protection. No overt s/s of aspiration/penetration observed with regular solids and thin liquids. Recommend continue easy to chew consistencies with thin liquids at this time, per patient preference. Visual scanning/attention and concerntation task completed. Patient was initially able to complete this task with approximately 70% accuracy. Patient was provided 1x verbal cue to review this task. Patient was able to find all mistakes during review time to complete this task with 90% accuracy. Poor attention skills noted with this task. Patient did required redirection to remain on task on multiple occasions. Patient does now recall that he is going to another facility upon d/c; however, he does not recall the name or location of the facility. Did remind patient of this. Patient verbalizes understanding.  Poor carryover noted, as patient was informed of this plan on swallowing precautions with minimal cues during meals. Goal 3: The patient will complete the jessi (tongue hold) technique to improve base of tongue retraction and base of tongue to pharyngeal wall approximation with 90% accuracy and minimal verbal cues. Goal 4: The patient will complete the effortful swallow maneuver to improve hyolaryngeal elevation, tongue base retraction, and vocal fold closure with 90% accuracy and minimal verbal cues. Comprehension: 5 - Patient understands basic needs (hungry/hot/pain)  Expression: 5 - Expresses basic ideas/needs only (hungry/hot/pain)  Social Interaction: 4 - Patient appropriate 75-90%+ of the time  Problem Solvin - Patient solves simple/routine tasks 75-90%+   Memory: 3 - Patient remembers 50%-74% of the time    ASSESSMENT:  Assessment: [x]Progressing towards goals          []Not Progressing towards goals    Patient Tolerance of Treatment:   [x]Tolerated well []Tolerated fair []Required rest breaks []Fatigued    Education:  Learner:  [x]Patient          []Significant Other          []Other  Education provided regarding:  [x]Goals and POC   []Diet and swallowing precautions    []Home Exercise Program  []Progress and/or discharge information  Method of Education:  [x]Discussion          []Demonstration          []Handout          []Other  Evaluation of Education:   [x]Verbalized understanding   []Demonstrates without assistance  []Demonstrates with assistance  []Needs further instruction     []No evidence of learning                  []No family present      Plan: [x]Continue with current plan of care    []Modify current plan of care as follows:    []Discharge patient    Discharge Location:    Services/Supervision Recommended:      []Patient continues to require treatment by a licensed therapist to address functional deficits as outlined in the established plan of care.         Electronically signed by JAMILA Pineda on 2023 at 2:02 PM
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
## 2023-08-02 NOTE — PROGRESS NOTES
[address] [facility]  Test for Patient Ary in the Areas of Transfers/Ambulation    Ambulation/Transfers   Independent ambulation in room: yes    -Device Type:  N/A  Independent transfers from wheelchair to surface in room:  Yes     Bathroom Transfers  Independent transfers in patient bathroom:  Yes         Inpatient Rehabilitation Nursing and Therapies feel as though the patient qualifies for an acute rehabilitation test for independence in the areas of transfers and ambulation prior to discharging from Inpatient Rehabilitation Unit at [facility].  This test for independence in the areas of transfers and ambulation must be agreed upon by the patient's physician, nurse, and therapists. Nurse Approval:  Electronically signed by Isa Jiménez.  [address] Shikha Martinez RN on 8/2/2023 at 2:57 PM      Physical Therapist Approval:  Electronically signed by Vinita Tello PTA on 8/2/2023 at 4:18 PM     Occupational Therapist Approval: Electronically signed by EULOGIO Frazier on 8/2/23 at 2:18 PM CDT

## 2023-08-03 LAB
ALBUMIN SERPL-MCNC: 3.7 G/DL (ref 3.5–5.2)
ALP SERPL-CCNC: 93 U/L (ref 40–130)
ALT SERPL-CCNC: 24 U/L (ref 5–41)
ANION GAP SERPL CALCULATED.3IONS-SCNC: 11 MMOL/L (ref 7–19)
AST SERPL-CCNC: 23 U/L (ref 5–40)
BASOPHILS # BLD: 0.1 K/UL (ref 0–0.2)
BASOPHILS NFR BLD: 1 % (ref 0–1)
BILIRUB SERPL-MCNC: 0.3 MG/DL (ref 0.2–1.2)
BUN SERPL-MCNC: 23 MG/DL (ref 8–23)
CALCIUM SERPL-MCNC: 8.9 MG/DL (ref 8.8–10.2)
CHLORIDE SERPL-SCNC: 103 MMOL/L (ref 98–111)
CO2 SERPL-SCNC: 26 MMOL/L (ref 22–29)
CREAT SERPL-MCNC: 1.2 MG/DL (ref 0.5–1.2)
EOSINOPHIL # BLD: 0.3 K/UL (ref 0–0.6)
EOSINOPHIL NFR BLD: 3.7 % (ref 0–5)
ERYTHROCYTE [DISTWIDTH] IN BLOOD BY AUTOMATED COUNT: 12.9 % (ref 11.5–14.5)
GLUCOSE SERPL-MCNC: 85 MG/DL (ref 74–109)
HCT VFR BLD AUTO: 38.2 % (ref 42–52)
HGB BLD-MCNC: 12.9 G/DL (ref 14–18)
IMM GRANULOCYTES # BLD: 0.1 K/UL
LYMPHOCYTES # BLD: 2.6 K/UL (ref 1.1–4.5)
LYMPHOCYTES NFR BLD: 31.2 % (ref 20–40)
MCH RBC QN AUTO: 29.5 PG (ref 27–31)
MCHC RBC AUTO-ENTMCNC: 33.8 G/DL (ref 33–37)
MCV RBC AUTO: 87.4 FL (ref 80–94)
MONOCYTES # BLD: 0.8 K/UL (ref 0–0.9)
MONOCYTES NFR BLD: 10 % (ref 0–10)
NEUTROPHILS # BLD: 4.4 K/UL (ref 1.5–7.5)
NEUTS SEG NFR BLD: 53.1 % (ref 50–65)
PLATELET # BLD AUTO: 237 K/UL (ref 130–400)
PMV BLD AUTO: 10.1 FL (ref 9.4–12.4)
POTASSIUM SERPL-SCNC: 4.2 MMOL/L (ref 3.5–5)
PROT SERPL-MCNC: 6.3 G/DL (ref 6.6–8.7)
RBC # BLD AUTO: 4.37 M/UL (ref 4.7–6.1)
SODIUM SERPL-SCNC: 140 MMOL/L (ref 136–145)
WBC # BLD AUTO: 8.3 K/UL (ref 4.8–10.8)

## 2023-08-03 PROCEDURE — 1180000000 HC REHAB R&B

## 2023-08-03 PROCEDURE — 6370000000 HC RX 637 (ALT 250 FOR IP): Performed by: PSYCHIATRY & NEUROLOGY

## 2023-08-03 PROCEDURE — 97530 THERAPEUTIC ACTIVITIES: CPT

## 2023-08-03 PROCEDURE — 80053 COMPREHEN METABOLIC PANEL: CPT

## 2023-08-03 PROCEDURE — 97129 THER IVNTJ 1ST 15 MIN: CPT

## 2023-08-03 PROCEDURE — 94760 N-INVAS EAR/PLS OXIMETRY 1: CPT

## 2023-08-03 PROCEDURE — 97110 THERAPEUTIC EXERCISES: CPT

## 2023-08-03 PROCEDURE — 85025 COMPLETE CBC W/AUTO DIFF WBC: CPT

## 2023-08-03 PROCEDURE — 97116 GAIT TRAINING THERAPY: CPT

## 2023-08-03 PROCEDURE — 36415 COLL VENOUS BLD VENIPUNCTURE: CPT

## 2023-08-03 PROCEDURE — 99232 SBSQ HOSP IP/OBS MODERATE 35: CPT | Performed by: PSYCHIATRY & NEUROLOGY

## 2023-08-03 PROCEDURE — 97130 THER IVNTJ EA ADDL 15 MIN: CPT

## 2023-08-03 RX ORDER — DIAZEPAM 5 MG/1
10 TABLET ORAL 3 TIMES DAILY
Status: DISCONTINUED | OUTPATIENT
Start: 2023-08-03 | End: 2023-08-04 | Stop reason: HOSPADM

## 2023-08-03 RX ORDER — ALOGLIPTIN 25 MG/1
25 TABLET, FILM COATED ORAL DAILY
Status: DISCONTINUED | OUTPATIENT
Start: 2023-08-03 | End: 2023-08-04 | Stop reason: HOSPADM

## 2023-08-03 RX ADMIN — ATORVASTATIN CALCIUM 80 MG: 80 TABLET, FILM COATED ORAL at 20:25

## 2023-08-03 RX ADMIN — METOPROLOL SUCCINATE 100 MG: 50 TABLET, EXTENDED RELEASE ORAL at 08:03

## 2023-08-03 RX ADMIN — THERA TABS 1 TABLET: TAB at 08:04

## 2023-08-03 RX ADMIN — OXYBUTYNIN CHLORIDE 5 MG: 5 TABLET, EXTENDED RELEASE ORAL at 08:03

## 2023-08-03 RX ADMIN — ALOGLIPTIN 25 MG: 25 TABLET, FILM COATED ORAL at 08:06

## 2023-08-03 RX ADMIN — FLUOXETINE 20 MG: 20 CAPSULE ORAL at 08:04

## 2023-08-03 RX ADMIN — LISINOPRIL 40 MG: 20 TABLET ORAL at 08:03

## 2023-08-03 RX ADMIN — QUETIAPINE FUMARATE 25 MG: 25 TABLET ORAL at 20:25

## 2023-08-03 RX ADMIN — DIAZEPAM 10 MG: 5 TABLET ORAL at 20:25

## 2023-08-03 RX ADMIN — DIAZEPAM 10 MG: 5 TABLET ORAL at 08:04

## 2023-08-03 RX ADMIN — AMLODIPINE BESYLATE 5 MG: 5 TABLET ORAL at 08:04

## 2023-08-03 RX ADMIN — QUETIAPINE FUMARATE 25 MG: 25 TABLET ORAL at 08:04

## 2023-08-03 RX ADMIN — FENOFIBRATE 54 MG: 54 TABLET, FILM COATED ORAL at 08:04

## 2023-08-03 RX ADMIN — Medication 100 MG: at 08:04

## 2023-08-03 RX ADMIN — DIAZEPAM 10 MG: 5 TABLET ORAL at 14:02

## 2023-08-03 NOTE — PLAN OF CARE
Problem: Discharge Planning  Goal: Discharge to home or other facility with appropriate resources  Outcome: Progressing     Problem: Safety - Adult  Goal: Free from fall injury  Outcome: Progressing     Problem: Skin/Tissue Integrity  Goal: Absence of new skin breakdown  Outcome: Progressing     Problem: Chronic Conditions and Co-morbidities  Goal: Patient's chronic conditions and co-morbidity symptoms are monitored and maintained or improved  Outcome: Progressing  Flowsheets (Taken 8/2/2023 2106)  Care Plan - Patient's Chronic Conditions and Co-Morbidity Symptoms are Monitored and Maintained or Improved: Monitor and assess patient's chronic conditions and comorbid symptoms for stability, deterioration, or improvement     Problem: Confusion  Goal: Confusion, delirium, dementia, or psychosis is improved or at baseline  Outcome: Progressing     Problem: Risk for Elopement  Goal: Patient will not exit the unit/facility without proper excort  Outcome: Progressing     Problem: Neurosensory - Adult  Goal: Achieves stable or improved neurological status  Outcome: Progressing  Flowsheets (Taken 8/2/2023 2106)  Achieves stable or improved neurological status: Assess for and report changes in neurological status  Goal: Absence of seizures  Outcome: Progressing  Goal: Remains free of injury related to seizures activity  Outcome: Progressing  Goal: Achieves maximal functionality and self care  Outcome: Progressing  Flowsheets (Taken 8/2/2023 2106)  Achieves maximal functionality and self care: Monitor swallowing and airway patency with patient fatigue and changes in neurological status     Problem: Skin/Tissue Integrity - Adult  Goal: Skin integrity remains intact  Outcome: Progressing  Goal: Incisions, wounds, or drain sites healing without S/S of infection  Outcome: Progressing  Goal: Oral mucous membranes remain intact  Outcome: Progressing     Problem: Pain  Goal: Verbalizes/displays adequate comfort level or baseline
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
## 2023-08-03 NOTE — PROGRESS NOTES
Recent Labs     08/03/23  0430   BUN 23       Recent Labs     08/03/23  0430   CREATININE 1.2       Estimated Creatinine Clearance: 61 mL/min (based on SCr of 1.2 mg/dL). Plan: Change Nesina back to 25 mg daily due to CrCl >= 60 mL/min.     Electronically signed by Huan Forrest, [address] [place] [address] on 8/3/2023 at 5:53 AM

## 2023-08-03 NOTE — PROGRESS NOTES
[facility]  INPATIENT SPEECH THERAPY  [facility] 8 REHAB UNIT  TIME   1000  1100    61 MINUTES     [x]Daily Note  []Progress Note     Date: 8/3/2023  Patient Name: Sofya Stevenson        MRN:   [id number]    Account #: [de-identified]  : 1955  (79 y.o.)  Gender: male   Primary Provider: Margarita York MD  Swallowing Status/Diet:  Liquid Consistency Recommendation: Thin  Diet Solids Recommendation: Easy to Chew     Subjective:    Patient sleeping, but alerted to SLP. Patient reported he had received his medications late last night. Patient asked regarding discharge today/tomorrow. Patient expressed he is ready to discharge and reported he would get more exercise at home than here. Objective:   Patient tasked to produce antonyms from the words given. This was completed with 80% accuracy independently. This increased to 90% with multiple attempts for one trial, and to 100% with verbal cues/prompts including phonetic cue in one trial. Delay noted in 3 trials. Schedule management task completed in which patient had to determine all the available options to schedule appointments given multiple factors. Patient was provided with blank paper and a pen. Patient encouraged/directed to use paper. Patient provided with mod-max verbal/visual cues/prompts in completion of this task. When asked what could make something like this easier in real life, patient responded to have his sons/family help him. Informed patient about keeping an updated calendar/planner that could make this task easier in real life. Attention/reading comprehension task completed with use of fictional medicine label. Patient answered questions about this task with 100% accuracy independently. Delay noted 1x. Attention/reading comprehension task completed with use of fictional receipt. Patient answered questions about this task 2/3 trials accurately independently.  Note patient had difficulty in identifying the store the receipt came from (store name

## 2023-08-03 NOTE — PROGRESS NOTES
Physical Therapy  Name: Miko Monroy  MRN:  [id number]  Date of service:  8/3/2023       08/03/23 1430   Restrictions/Precautions   Restrictions/Precautions Up Ad Ana   Lower Extremity Weight Bearing Restrictions   Right Lower Extremity Weight Bearing Weight Bearing As Tolerated   Left Lower Extremity Weight Bearing Weight Bearing As Tolerated   General Comment   Comments napping upon arrival   Subjective   Subjective Pt agreeable. Only complains of feeling cold.    Subjective   Subjective denies   Bed mobility   Rolling to Left Independent   Rolling to Right Independent   Supine to Sit Independent   Sit to Supine Independent   Transfers   Sit to Stand Independent   Stand to Sit Independent   Ambulation   Surface Level tile   Device No Device   Assistance Independent;Supervision   Quality of Gait reciprocal, occasional self corrected slight deviation   Distance 150'   Ambulation 2   Surface - 2 level tile   Device 2 No device   Assistance 2 Stand by assistance   Gait Deviations Slow Mahsa   Distance 600'   Comments collecting rubber strips high and low and L and R placed randomly; pt able to retrieve all without LOB while carrying plastic bin   Ambulation 3   Surface - 3 level tile   Device 3 No device   Assistance 3 Stand by assistance   Distance 700'   Comments amb throughout unit with direction changes and quick turns upon command, pt without lob sidestepping, back stepping, or turning though does reduce speed   Stairs   # Steps  6   Stairs Height 6\"   Rails Right ascending   Assistance Stand by assistance   Comment rciprocal pattern up and down with good confidence and pace   Wheelchair Activities   Propulsion Yes   Propulsion 1   Propulsion Manual   Level Level Tile   Method RUE;LUE;RLE;LLE  (combination LE's and UE's)   Level of Assistance Independent   Distance 150'   PT Exercises   Dynamic Standing Balance Exercises sidestpping 12' x 2 L and R without UE support   Standing Open/Closed Kinetic Chain Exercises

## 2023-08-03 NOTE — PLAN OF CARE
Problem: Safety - Adult  Goal: Free from fall injury  8/3/2023 0937 by Genia Johnson RN  Outcome: Progressing  8/3/2023 0219 by Geovanny Doss RN  Outcome: Progressing   Up ad janet in room

## 2023-08-04 VITALS
HEIGHT: 69 IN | SYSTOLIC BLOOD PRESSURE: 108 MMHG | OXYGEN SATURATION: 96 % | HEART RATE: 53 BPM | WEIGHT: 175.3 LBS | RESPIRATION RATE: 14 BRPM | DIASTOLIC BLOOD PRESSURE: 74 MMHG | BODY MASS INDEX: 25.96 KG/M2 | TEMPERATURE: 97.2 F

## 2023-08-04 PROCEDURE — 99239 HOSP IP/OBS DSCHRG MGMT >30: CPT | Performed by: PSYCHIATRY & NEUROLOGY

## 2023-08-04 PROCEDURE — 97129 THER IVNTJ 1ST 15 MIN: CPT

## 2023-08-04 PROCEDURE — 92526 ORAL FUNCTION THERAPY: CPT

## 2023-08-04 PROCEDURE — 97130 THER IVNTJ EA ADDL 15 MIN: CPT

## 2023-08-04 PROCEDURE — 94760 N-INVAS EAR/PLS OXIMETRY 1: CPT

## 2023-08-04 PROCEDURE — 6370000000 HC RX 637 (ALT 250 FOR IP): Performed by: PSYCHIATRY & NEUROLOGY

## 2023-08-04 RX ORDER — ATORVASTATIN CALCIUM 80 MG/1
80 TABLET, FILM COATED ORAL DAILY
Qty: 30 TABLET | Refills: 0 | Status: SHIPPED | OUTPATIENT
Start: 2023-08-04

## 2023-08-04 RX ORDER — FENOFIBRATE 54 MG/1
54 TABLET ORAL DAILY
Qty: 30 TABLET | Refills: 0 | Status: SHIPPED | OUTPATIENT
Start: 2023-08-04

## 2023-08-04 RX ORDER — QUETIAPINE FUMARATE 25 MG/1
25 TABLET, FILM COATED ORAL 2 TIMES DAILY
Qty: 60 TABLET | Refills: 0 | Status: SHIPPED | OUTPATIENT
Start: 2023-08-04

## 2023-08-04 RX ORDER — LISINOPRIL 40 MG/1
40 TABLET ORAL DAILY
Qty: 30 TABLET | Refills: 0 | Status: SHIPPED | OUTPATIENT
Start: 2023-08-04

## 2023-08-04 RX ORDER — TRAZODONE HYDROCHLORIDE 150 MG/1
150 TABLET ORAL NIGHTLY PRN
Qty: 30 TABLET | Refills: 0 | Status: SHIPPED | OUTPATIENT
Start: 2023-08-04

## 2023-08-04 RX ORDER — DIAZEPAM 10 MG/1
10 TABLET ORAL 3 TIMES DAILY
Qty: 90 TABLET | Refills: 0 | Status: SHIPPED | OUTPATIENT
Start: 2023-08-04 | End: 2023-09-03

## 2023-08-04 RX ORDER — MULTIVITAMIN WITH IRON
1 TABLET ORAL DAILY
Qty: 30 TABLET | Refills: 0 | Status: SHIPPED | OUTPATIENT
Start: 2023-08-04

## 2023-08-04 RX ORDER — ALOGLIPTIN 25 MG/1
25 TABLET, FILM COATED ORAL DAILY
Qty: 30 TABLET | Refills: 0 | Status: SHIPPED | OUTPATIENT
Start: 2023-08-04

## 2023-08-04 RX ORDER — METOPROLOL SUCCINATE 100 MG/1
100 TABLET, EXTENDED RELEASE ORAL DAILY
Qty: 30 TABLET | Refills: 0 | Status: SHIPPED | OUTPATIENT
Start: 2023-08-04

## 2023-08-04 RX ORDER — AMLODIPINE BESYLATE 5 MG/1
5 TABLET ORAL DAILY
Qty: 30 TABLET | Refills: 0 | Status: SHIPPED | OUTPATIENT
Start: 2023-08-04

## 2023-08-04 RX ORDER — OXYBUTYNIN CHLORIDE 5 MG/1
5 TABLET, EXTENDED RELEASE ORAL DAILY
Qty: 30 TABLET | Refills: 0 | Status: SHIPPED | OUTPATIENT
Start: 2023-08-04

## 2023-08-04 RX ORDER — FLUOXETINE HYDROCHLORIDE 20 MG/1
20 CAPSULE ORAL DAILY
Qty: 30 CAPSULE | Refills: 0 | Status: SHIPPED | OUTPATIENT
Start: 2023-08-04

## 2023-08-04 RX ORDER — THIAMINE MONONITRATE (VIT B1) 100 MG
100 TABLET ORAL DAILY
Qty: 30 TABLET | Refills: 0 | Status: SHIPPED | OUTPATIENT
Start: 2023-08-04

## 2023-08-04 RX ADMIN — DIAZEPAM 10 MG: 5 TABLET ORAL at 07:31

## 2023-08-04 RX ADMIN — QUETIAPINE FUMARATE 25 MG: 25 TABLET ORAL at 07:31

## 2023-08-04 RX ADMIN — FENOFIBRATE 54 MG: 54 TABLET, FILM COATED ORAL at 07:31

## 2023-08-04 RX ADMIN — THERA TABS 1 TABLET: TAB at 07:32

## 2023-08-04 RX ADMIN — Medication 100 MG: at 07:32

## 2023-08-04 RX ADMIN — METOPROLOL SUCCINATE 100 MG: 50 TABLET, EXTENDED RELEASE ORAL at 07:31

## 2023-08-04 RX ADMIN — LISINOPRIL 40 MG: 20 TABLET ORAL at 07:32

## 2023-08-04 RX ADMIN — AMLODIPINE BESYLATE 5 MG: 5 TABLET ORAL at 07:31

## 2023-08-04 RX ADMIN — ALOGLIPTIN 25 MG: 25 TABLET, FILM COATED ORAL at 07:32

## 2023-08-04 RX ADMIN — FLUOXETINE 20 MG: 20 CAPSULE ORAL at 07:32

## 2023-08-04 RX ADMIN — OXYBUTYNIN CHLORIDE 5 MG: 5 TABLET, EXTENDED RELEASE ORAL at 07:32
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
## 2023-08-04 NOTE — DISCHARGE SUMMARY
Neurology Discharge Summary     Patient Identification:  Estevan Ha is a 79 y.o. male. :  1955  Admit Date:  2023  Discharge date : 2023   Attending Provider: Jacobo Apgar, MD     Account Number: [de-identified]                                   Admission Diagnoses:   Acute ischemic stroke [facility]) [I63.9]    Discharge Diagnoses:  Principal Problem:    Acute ischemic stroke [facility])  Active Problems:    Subarachnoid hemorrhage ([address])    TBI (traumatic brain injury) ([address])    Essential hypertension    Other hyperlipidemia    Cognitive deficits    Subdural hemorrhage ([address])    Altered mental state    History of drug abuse ([facility])  Resolved Problems:    * No resolved hospital problems. *      Discharge Medications:    Current Discharge Medication List             Details   alogliptin (NESINA) 25 MG TABS tablet Take 1 tablet by mouth daily  Qty: 30 tablet, Refills: 0      QUEtiapine (SEROQUEL) 25 MG tablet Take 1 tablet by mouth 2 times daily  Qty: 60 tablet, Refills: 0      amLODIPine (NORVASC) 5 MG tablet Take 1 tablet by mouth daily  Qty: 30 tablet, Refills: 0      Multiple Vitamin (MULTIVITAMIN) TABS tablet Take 1 tablet by mouth daily  Qty: 30 tablet, Refills: 0      thiamine mononitrate (THIAMINE) 100 MG tablet Take 1 tablet by mouth daily  Qty: 30 tablet, Refills: 0                Details   diazePAM (VALIUM) 10 MG tablet Take 1 tablet by mouth 3 times daily for 30 days. Max Daily Amount: 30 mg  Qty: 90 tablet, Refills: 0    Associated Diagnoses: Altered mental status, unspecified altered mental status type; Cognitive deficits; Acute ischemic stroke ([address]);  Subarachnoid hemorrhage (HCC)      FLUoxetine (PROZAC) 20 MG capsule Take 1 capsule by mouth daily  Qty: 30 capsule, Refills: 0      traZODone (DESYREL) 150 MG tablet Take 1 tablet by mouth nightly as needed for Sleep  Qty: 30 tablet, Refills: 0      atorvastatin (LIPITOR) 80 MG tablet Take 1 tablet by mouth daily  Qty: 30 tablet, Refills: 0

## 2023-08-04 NOTE — PROGRESS NOTES
[facility]  INPATIENT SPEECH THERAPY  [facility] 8 [facility] UNIT  TIME   4439  1662  75 MINUTES    [x]Daily Note  []Progress Note    Date: 2023  Patient Name: Mulu Strickland        MRN: [id number]    Account #: [de-identified]  : 1955  (79 y.o.)  Gender: male   Primary Provider: Eric Lucia MD  Swallowing Status/Diet:  Liquid Consistency Recommendation: Thin  Diet Solids Recommendation: Easy to Chew    Subjective: Patient alert, cooperative, and upright in bed. No pain reported at this time. Objective:     Patient reports he is d/c today. He states he will be d/c home with home health services. Patient reports he will have his sons manage his medications upon d/c. He states he has a pill organizer. No difficulties reported with swallowing solids, liquids, and medications at this time. Swallowing reassessed during tx. Consistencies observed include regular solids with thin liquids. Patient did not have dentures in place for trials. Oral prep reveals decreased vertical jaw movement with regular solids. Oral transit timing ranges from 1-2 seconds with regular solids and thin liquids. No significant residue observed with regular solids. Laryngeal movement observed to be consistently sluggish and mildly decreased for swallow airway protection. No overt s/s of aspiration/penetration observed with regular solids and thin liquids. Recommend continue easy to chew consistencies with thin liquids at this time, per patient preference. Patient would be OK for regular diet upgrade. Poor safety awareness noted, as patient is unaware of cognitive/physical impairments. Patient is also impulsive. Problem solving/safety awareness/functional language task completed. Patient is provided a fictional medication label. Patient is required to read and interpret the label. Patient reports he was independently managing his medications prior to recent hospitalization. Task completed with 70% accuracy.  Moderate semantic
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
## 2023-08-04 NOTE — PLAN OF CARE
Problem: Discharge Planning  Goal: Discharge to home or other facility with appropriate resources  8/4/2023 0923 by Reed Lennox, RN  Outcome: Adequate for Discharge  8/3/2023 2138 by Rooney Gosselin, RN  Outcome: Progressing  Flowsheets (Taken 8/3/2023 2020)  Discharge to home or other facility with appropriate resources: Refer to discharge planning if patient needs post-hospital services based on physician order or complex needs related to functional status, cognitive ability or social support system     Problem: Safety - Adult  Goal: Free from fall injury  8/4/2023 0923 by Reed Lennox, RN  Outcome: Adequate for Discharge  8/3/2023 2138 by Rooney Gosselin, RN  Outcome: Progressing     Problem: Skin/Tissue Integrity  Goal: Absence of new skin breakdown  Description: 1. Monitor for areas of redness and/or skin breakdown  2. Assess vascular access sites hourly  3. Every 4-6 hours minimum:  Change oxygen saturation probe site  4. Every 4-6 hours:  If on nasal continuous positive airway pressure, respiratory therapy assess nares and determine need for appliance change or resting period. Outcome: Adequate for Discharge     Problem: Chronic Conditions and Co-morbidities  Goal: Patient's chronic conditions and co-morbidity symptoms are monitored and maintained or improved  Outcome: Adequate for Discharge  Flowsheets (Taken 8/3/2023 2020 by Rooney Gosselin, RN)  Care Plan - Patient's Chronic Conditions and Co-Morbidity Symptoms are Monitored and Maintained or Improved: Monitor and assess patient's chronic conditions and comorbid symptoms for stability, deterioration, or improvement     Problem: Confusion  Goal: Confusion, delirium, dementia, or psychosis is improved or at baseline  Description: INTERVENTIONS:  1. Assess for possible contributors to thought disturbance, including medications, impaired vision or hearing, underlying metabolic abnormalities, dehydration, psychiatric diagnoses, and notify attending LIP  2. [place] high risk fall precautions, as indicated  3. Provide frequent short contacts to provide reality reorientation, refocusing and direction  4. Decrease environmental stimuli, including noise as appropriate  5. Monitor and intervene to maintain adequate nutrition, hydration, elimination, sleep and activity  6. If unable to ensure safety without constant attention obtain sitter and review sitter guidelines with assigned personnel  7.  Initiate Psychosocial CNS and Spiritual Care consult, as indicated  Outcome: Adequate for Discharge     Problem: Risk for Elopement  Goal: Patient will not exit the unit/facility without proper excort  Outcome: Adequate for Discharge     Problem: Neurosensory - Adult  Goal: Achieves stable or improved neurological status  Outcome: Adequate for Discharge  Flowsheets (Taken 8/3/2023 2020 by Riley Levy RN)  Achieves stable or improved neurological status: Assess for and report changes in neurological status  Goal: Absence of seizures  Outcome: Adequate for Discharge  Goal: Remains free of injury related to seizures activity  Outcome: Adequate for Discharge  Goal: Achieves maximal functionality and self care  Outcome: Adequate for Discharge     Problem: Skin/Tissue Integrity - Adult  Goal: Skin integrity remains intact  Outcome: Adequate for Discharge  Flowsheets (Taken 8/3/2023 2020 by Riley Levy, RN)  Skin Integrity Remains Intact: Monitor for areas of redness and/or skin breakdown  Goal: Incisions, wounds, or drain sites healing without S/S of infection  Outcome: Adequate for Discharge  Flowsheets (Taken 8/3/2023 2020 by Riley Levy, RN)  Incisions, Wounds, or Drain Sites Healing Without Sign and Symptoms of Infection: TWICE DAILY: Assess and document skin integrity  Goal: Oral mucous membranes remain intact  Outcome: Adequate for Discharge     Problem: Pain  Goal: Verbalizes/displays adequate comfort level or baseline comfort level  Outcome: Adequate for Discharge     Problem:

## 2023-08-04 NOTE — PLAN OF CARE
Problem: Discharge Planning  Goal: Discharge to home or other facility with appropriate resources  8/3/2023 2138 by Lena Nguyễn RN  Outcome: Progressing  Flowsheets (Taken 8/3/2023 2020)  Discharge to home or other facility with appropriate resources: Refer to discharge planning if patient needs post-hospital services based on physician order or complex needs related to functional status, cognitive ability or social support system  8/3/2023 0937 by Alexander Guillen RN  Outcome: Progressing  Flowsheets (Taken 8/3/2023 4200)  Discharge to home or other facility with appropriate resources: Refer to discharge planning if patient needs post-hospital services based on physician order or complex needs related to functional status, cognitive ability or social support system     Problem: Safety - Adult  Goal: Free from fall injury  8/3/2023 2138 by Lena Nguyễn RN  Outcome: Progressing  8/3/2023 0937 by Alexander Guillen RN  Outcome: Progressing     Problem: Skin/Tissue Integrity  Goal: Absence of new skin breakdown  Description: 1. Monitor for areas of redness and/or skin breakdown  2. Assess vascular access sites hourly  3. Every 4-6 hours minimum:  Change oxygen saturation probe site  4. Every 4-6 hours:  If on nasal continuous positive airway pressure, respiratory therapy assess nares and determine need for appliance change or resting period.   8/3/2023 6659 by Alexander Guillen RN  Outcome: Progressing     Problem: Chronic Conditions and Co-morbidities  Goal: Patient's chronic conditions and co-morbidity symptoms are monitored and maintained or improved  Recent Flowsheet Documentation  Taken 8/3/2023 2020 by Lena Nguyễn [facility] - Patient's Chronic Conditions and Co-Morbidity Symptoms are Monitored and Maintained or Improved: Monitor and assess patient's chronic conditions and comorbid symptoms for stability, deterioration, or improvement  8/3/2023 0937 by Alexander Guillen RN  Outcome: Progressing  Flowsheets (Taken 8/3/2023 0810)  Care Plan - Patient's Chronic Conditions and Co-Morbidity Symptoms are Monitored and Maintained or Improved: Monitor and assess patient's chronic conditions and comorbid symptoms for stability, deterioration, or improvement     Problem: Confusion  Goal: Confusion, delirium, dementia, or psychosis is improved or at baseline  Description: INTERVENTIONS:  1. Assess for possible contributors to thought disturbance, including medications, impaired vision or hearing, underlying metabolic abnormalities, dehydration, psychiatric diagnoses, and notify attending LIP  2. [place] high risk fall precautions, as indicated  3. Provide frequent short contacts to provide reality reorientation, refocusing and direction  4. Decrease environmental stimuli, including noise as appropriate  5. Monitor and intervene to maintain adequate nutrition, hydration, elimination, sleep and activity  6. If unable to ensure safety without constant attention obtain sitter and review sitter guidelines with assigned personnel  7.  Initiate Psychosocial CNS and Spiritual Care consult, as indicated  8/3/2023 5612 by Reed Lennox, RN  Outcome: Progressing     Problem: Risk for Elopement  Goal: Patient will not exit the unit/facility without proper excort  8/3/2023 0937 by Reed Lennox, RN  Outcome: Progressing     Problem: Neurosensory - Adult  Goal: Achieves stable or improved neurological status  Recent Flowsheet Documentation  Taken 8/3/2023 2020 by Rooney Gosselin, RN  Achieves stable or improved neurological status: Assess for and report changes in neurological status  8/3/2023 0937 by Reed Lennox, RN  Outcome: Progressing  Goal: Absence of seizures  8/3/2023 0937 by Reed Lennox, RN  Outcome: Progressing  Goal: Remains free of injury related to seizures activity  8/3/2023 0937 by Reed Lennox, RN  Outcome: Progressing  Goal: Achieves maximal functionality and self care  8/3/2023

## 2023-08-07 NOTE — DISCHARGE SUMMARY
this morning showed a new small wedge-shaped area of low attenuation in the R cerebellum concerning for a new infarct and unchanged other findings. Comparison of this image to the patient's last CT showed that his prior CT on 7/14 PM was already showing signs of vascular infarct. The infarct in the R cerebellum is within PICA territory and could be secondary to atherosclerotic embolus vs dissection. With the patient's history of trauma dissection as etiology is favored over atherosclerotic emboli. 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 Pt had altered mental status/delirium and was placed in restraints. On 7/20 restraints were removed. His mental status had improved and iwas able to participate with therapy and follow commands. He is medically stable on room air. He was ready to begin rehab with plan of returning home after rehab dc w/support from his family. The night of admission to inpatient rehab became agitated. Security was called. Haldol was given with improvement. The patient was admitted to inpatient rehab with improvement. Family has people to watch him at home and plan to take him home with home health. SPEECH THERAPY        The CLQT was completed on 7/26/23. He exhibited severe deficits in attention, executive function   and visuospatial skills. Moderate deficits were noted in memory. Mild language deficits were noted. His overall score on the CLQT was moderate. No difficulties reported with swallowing solids, liquids, and medications at this time. Recommend continue easy to chew consistencies with thin liquids at this time, per patient preference. Patient would be OK for regular diet upgrade. Poor safety awareness noted, as patient is unaware of cognitive/physical impairments.  Patient is also impulsive    Do recommend patient receive total assistance with managing medications and finances; however, do know patient is not compliant with

## 2023-08-09 ENCOUNTER — TELEPHONE (OUTPATIENT)
Dept: VASCULAR SURGERY | Facility: CLINIC | Age: 68
End: 2023-08-09
Payer: MEDICARE

## 2023-08-10 ENCOUNTER — HOSPITAL ENCOUNTER (OUTPATIENT)
Dept: ULTRASOUND IMAGING | Facility: HOSPITAL | Age: 68
Discharge: HOME | End: 2023-08-10
Payer: MEDICARE

## 2023-08-10 ENCOUNTER — OFFICE VISIT (OUTPATIENT)
Dept: VASCULAR SURGERY | Facility: CLINIC | Age: 68
End: 2023-08-10
Payer: MEDICARE

## 2023-08-10 VITALS
OXYGEN SATURATION: 97 % | DIASTOLIC BLOOD PRESSURE: 90 MMHG | WEIGHT: 179 LBS | HEIGHT: 67 IN | BODY MASS INDEX: 28.09 KG/M2 | SYSTOLIC BLOOD PRESSURE: 162 MMHG | HEART RATE: 71 BPM

## 2023-08-10 DIAGNOSIS — I73.9 PAD (PERIPHERAL ARTERY DISEASE): ICD-10-CM

## 2023-08-10 DIAGNOSIS — I74.09 AORTOILIAC OCCLUSIVE DISEASE: Primary | ICD-10-CM

## 2023-08-10 DIAGNOSIS — I65.22 CAROTID OCCLUSION, LEFT: ICD-10-CM

## 2023-08-10 DIAGNOSIS — I65.21 STENOSIS OF RIGHT CAROTID ARTERY: ICD-10-CM

## 2023-08-10 DIAGNOSIS — E78.2 MIXED HYPERLIPIDEMIA: ICD-10-CM

## 2023-08-10 DIAGNOSIS — I10 ESSENTIAL HYPERTENSION: ICD-10-CM

## 2023-08-10 DIAGNOSIS — Z72.0 TOBACCO ABUSE: ICD-10-CM

## 2023-08-10 PROCEDURE — 99214 OFFICE O/P EST MOD 30 MIN: CPT | Performed by: NURSE PRACTITIONER

## 2023-08-10 PROCEDURE — 1160F RVW MEDS BY RX/DR IN RCRD: CPT | Performed by: NURSE PRACTITIONER

## 2023-08-10 PROCEDURE — 3080F DIAST BP >= 90 MM HG: CPT | Performed by: NURSE PRACTITIONER

## 2023-08-10 PROCEDURE — 1159F MED LIST DOCD IN RCRD: CPT | Performed by: NURSE PRACTITIONER

## 2023-08-10 PROCEDURE — 93880 EXTRACRANIAL BILAT STUDY: CPT

## 2023-08-10 PROCEDURE — 93923 UPR/LXTR ART STDY 3+ LVLS: CPT

## 2023-08-10 PROCEDURE — 3077F SYST BP >= 140 MM HG: CPT | Performed by: NURSE PRACTITIONER

## 2023-08-10 NOTE — LETTER
"August 20, 2023     CARYN Chaves  [address] [address]    Patient: Campbell Casas   YOB: 1955   Date of Visit: 8/10/2023     Dear CARYN Chaves:       Thank you for referring Campbell Casas to me for evaluation. Below are the relevant portions of my assessment and plan of care.    If you have questions, please do not hesitate to call me. I look forward to following Campbell along with you.         Sincerely,        CARYN Boyd        CC: No Recipients    Reyna Lopez APRN  08/20/23 1729  Sign when Signing Visit  08/10/2023     Yaa Fox APRN  [address] [address]        Campbell Casas  1955    Chief Complaint   Patient presents with    Follow-up     6 month follow up w/ DK/Carotid. Last seen 1/3/23. Patient states for the past 3 months they would be driving or walking and they would \"blackout\" and wouldn't be able to remember what's going on.        Dear Yaa Fox APRN:    HPI     I had the pleasure of seeing you patient in the office today for follow up.  As you recall, the patient is a 68 y.o. male who we are currently following for routine health maintenance.  He was having complaints of pain to his legs for the past couple of years.  He really describes bilateral hip and buttock pain and reports they lock up on him.  He did undergo an aortoiliac angiogram with intravascular lithotripsy of the right common femoral and proximal small superficial femoral arteries with lithotripsy of the right and left external iliac arteries.  He also underwent a left common femoral endarterectomy on 7/13/2022.  He states he was having episodes of when he was driving or walking that he would \"blackout \".  He does state a lot of times this is with positional changes.  He is maintained on Plavix, Pletal, Lipitor, and Tricor.  He is a current daily smoker.  He did have noninvasive testing performed, which I did review in office.    Review of Systems " "  Constitutional: Negative.    HENT: Negative.     Eyes: Negative.    Respiratory: Negative.     Cardiovascular: Negative.         Efra hip/buttock pain   Gastrointestinal: Negative.    Endocrine: Negative.    Genitourinary: Negative.    Musculoskeletal: Negative.    Skin: Negative.    Allergic/Immunologic: Negative.    Neurological:  Positive for syncope and light-headedness.   Hematological: Negative.    Psychiatric/Behavioral: Negative.     All other systems reviewed and are negative.     /90   Pulse 71   Ht 170.2 cm (67\")   Wt 81.2 kg (179 lb)   SpO2 97%   BMI 28.04 kg/mý   Physical Exam  Vitals and nursing note reviewed.   Constitutional:       Appearance: Normal appearance. He is well-developed and overweight.   HENT:      Head: Normocephalic and atraumatic.   Eyes:      General: No scleral icterus.     Pupils: Pupils are equal, round, and reactive to light.   Neck:      Thyroid: No thyromegaly.      Vascular: No carotid bruit or JVD.   Cardiovascular:      Rate and Rhythm: Normal rate and regular rhythm.      Pulses:           Carotid pulses are 2+ on the right side and 2+ on the left side.       Femoral pulses are 2+ on the right side and 2+ on the left side.       Popliteal pulses are 2+ on the right side and 2+ on the left side.        Dorsalis pedis pulses are detected w/ Doppler on the right side and detected w/ Doppler on the left side.        Posterior tibial pulses are detected w/ Doppler on the right side and detected w/ Doppler on the left side.      Heart sounds: Normal heart sounds.   Pulmonary:      Effort: Pulmonary effort is normal.      Breath sounds: Normal breath sounds.   Abdominal:      General: Bowel sounds are normal. There is no distension or abdominal bruit.      Palpations: Abdomen is soft. There is no mass.      Tenderness: There is no abdominal tenderness.   Musculoskeletal:         General: Normal range of motion.      Cervical back: Neck supple.   Lymphadenopathy:      " Cervical: No cervical adenopathy.   Skin:     General: Skin is warm and dry.   Neurological:      General: No focal deficit present.      Mental Status: He is alert and oriented to person, place, and time.      Cranial Nerves: No cranial nerve deficit.      Sensory: No sensory deficit.   Psychiatric:         Mood and Affect: Mood normal.         Behavior: Behavior normal. Behavior is cooperative.         Thought Content: Thought content normal.         Judgment: Judgment normal.      DIAGNOSTIC DATA:   istory: PAD     Comments: Bilateral lower extremity arterial with multi-level pulse  volume recordings and segmental pressures were performed at rest and  stress.     The right ankle/brachial index is 1.01. The waveforms are triphasic  without dampening.These findings are consistent with no significant  arterial insufficiency of the right lower extremity at rest.     The left ankle/brachial index is 0.81. The waveforms are biphasic  without dampening. These findings are consistent with mild arterial  insufficiency of the left lower extremity at rest.     IMPRESSION:  Impression:  1. No significant arterial insufficiency of the right lower extremity at  rest.  2. Mild arterial insufficiency of the left lower extremity at rest.        This report was finalized on 08/10/2023 14:20 by Dr. Esequiel Vaz MD.      Narrative & Impression   History: Carotid occlusive disease     IMPRESSION:  Impression:  1. There is 50-69% stenosis of the right internal carotid artery.  2. There is known occlusion of the left internal carotid artery.  3. Antegrade flow is demonstrated in bilateral vertebral arteries.     Comments: Bilateral carotid vertebral arterial duplex scan was  performed.     Grayscale imaging shows intimal thickening and calcified elements at the  carotid bifurcation. The right internal carotid artery peak systolic  velocity is 369.5 cm/sec. The end-diastolic velocity is 89.2 cm/sec. The  right ICA/CCA ratio is  approximately 4.5 . These findings correlate with  50-69% stenosis of the right internal carotid artery.     There is a known occlusion of the left internal carotid artery.     Antegrade flow is demonstrated in bilateral vertebral arteries.  There is greater than 50% stenosis in bilateral external carotid  arteries.  This report was finalized on 08/10/2023 14:32 by Dr. Esequiel Vaz MD.     Patient Active Problem List   Diagnosis    Tobacco abuse    Essential hypertension    Other hyperlipidemia    PAD (peripheral artery disease)    Aortoiliac occlusive disease    Preop testing    CKD (chronic kidney disease) stage 4, GFR 15-29 ml/min    Coronary artery disease of native artery of native heart with stable angina pectoris    Ischemic cardiomyopathy    Overweight with body mass index (BMI) of 29 to 29.9 in adult    Carotid occlusion, left    Stenosis of right carotid artery         ICD-10-CM ICD-9-CM   1. Aortoiliac occlusive disease  I74.09 444.09   2. PAD (peripheral artery disease)  I73.9 443.9   3. Stenosis of right carotid artery  I65.21 433.10   4. Carotid occlusion, left  I65.22 433.10   5. Tobacco abuse  Z72.0 305.1   6. Essential hypertension  I10 401.9   7. Mixed hyperlipidemia  E78.2 272.2           PLAN: After thoroughly evaluating Campbell Casas, I believe the best course of action is to remain conservative from vascular surgery standpoint.  I did review his testing which shows 50 to 69% right carotid stenosis with a known left carotid occlusion and no significant arterial insufficiency of the right lower extremity with mild to the left.  His syncope sounds as if its positional however blood pressure fine in office today.  He is following with cardiology.  We will see him back in 6 months with repeat noninvasive testing including a carotid duplex and ABIs.  I did discuss vascular risk factors as they pertain to the progression of vascular disease including controlling his hypertension and  hyperlipidemia.  His blood pressure stable on his current medication.  He should continue on his Plavix 75 mg daily, Lipitor 80 mg daily and Tricor 54 mg daily in addition to his other medications.  Unfortunately he is a current daily smoker but reports he has cut back to 14 cigarettes/day.  This was all discussed in full with complete understanding.  Thank you for allowing me to participate in the care of your patient.  Please do not hesitate to call with any questions or concerns.  We will keep you aware of any further encounters with Campbell Casas.      Sincerely Yours,      CARYN Boyd

## 2023-08-28 ENCOUNTER — OFFICE VISIT (OUTPATIENT)
Dept: CARDIOLOGY | Facility: CLINIC | Age: 68
End: 2023-08-28
Payer: MEDICARE

## 2023-08-28 VITALS
BODY MASS INDEX: 27.62 KG/M2 | HEART RATE: 75 BPM | HEIGHT: 67 IN | RESPIRATION RATE: 18 BRPM | WEIGHT: 176 LBS | SYSTOLIC BLOOD PRESSURE: 150 MMHG | DIASTOLIC BLOOD PRESSURE: 90 MMHG | OXYGEN SATURATION: 97 %

## 2023-08-28 DIAGNOSIS — Z72.0 TOBACCO ABUSE: ICD-10-CM

## 2023-08-28 DIAGNOSIS — I60.9 SUBARACHNOID HEMORRHAGE: ICD-10-CM

## 2023-08-28 DIAGNOSIS — I73.9 PAD (PERIPHERAL ARTERY DISEASE): ICD-10-CM

## 2023-08-28 DIAGNOSIS — E78.5 DYSLIPIDEMIA: ICD-10-CM

## 2023-08-28 DIAGNOSIS — I25.5 ISCHEMIC CARDIOMYOPATHY: ICD-10-CM

## 2023-08-28 DIAGNOSIS — I10 ESSENTIAL HYPERTENSION: ICD-10-CM

## 2023-08-28 DIAGNOSIS — I65.21 CAROTID STENOSIS, ASYMPTOMATIC, RIGHT: ICD-10-CM

## 2023-08-28 DIAGNOSIS — I25.118 CORONARY ARTERY DISEASE OF NATIVE ARTERY OF NATIVE HEART WITH STABLE ANGINA PECTORIS: Primary | ICD-10-CM

## 2023-08-28 DIAGNOSIS — N18.4 CKD (CHRONIC KIDNEY DISEASE) STAGE 4, GFR 15-29 ML/MIN: ICD-10-CM

## 2023-08-28 PROCEDURE — 3080F DIAST BP >= 90 MM HG: CPT | Performed by: NURSE PRACTITIONER

## 2023-08-28 PROCEDURE — 3077F SYST BP >= 140 MM HG: CPT | Performed by: NURSE PRACTITIONER

## 2023-08-28 PROCEDURE — 99215 OFFICE O/P EST HI 40 MIN: CPT | Performed by: NURSE PRACTITIONER

## 2023-08-28 PROCEDURE — 1159F MED LIST DOCD IN RCRD: CPT | Performed by: NURSE PRACTITIONER

## 2023-08-28 PROCEDURE — 1160F RVW MEDS BY RX/DR IN RCRD: CPT | Performed by: NURSE PRACTITIONER

## 2023-08-28 RX ORDER — LISINOPRIL 40 MG/1
40 TABLET ORAL DAILY
Qty: 90 TABLET | Refills: 3 | Status: SHIPPED | OUTPATIENT
Start: 2023-08-28

## 2023-08-28 RX ORDER — METOPROLOL SUCCINATE 100 MG/1
100 TABLET, EXTENDED RELEASE ORAL
Qty: 90 TABLET | Refills: 3 | Status: SHIPPED | OUTPATIENT
Start: 2023-08-28

## 2023-08-28 RX ORDER — QUETIAPINE FUMARATE 25 MG/1
1 TABLET, FILM COATED ORAL 2 TIMES DAILY
COMMUNITY
Start: 2023-08-04

## 2023-08-28 RX ORDER — AMLODIPINE BESYLATE 10 MG/1
10 TABLET ORAL
Qty: 30 TABLET | Refills: 11 | Status: SHIPPED | OUTPATIENT
Start: 2023-08-28

## 2023-08-28 RX ORDER — ALOGLIPTIN 25 MG/1
1 TABLET, FILM COATED ORAL DAILY
COMMUNITY
Start: 2023-08-04 | End: 2023-08-28 | Stop reason: ALTCHOICE

## 2023-08-28 RX ORDER — ATORVASTATIN CALCIUM 80 MG/1
80 TABLET, FILM COATED ORAL
Qty: 90 TABLET | Refills: 3 | Status: SHIPPED | OUTPATIENT
Start: 2023-08-28
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
## 2023-09-05 DIAGNOSIS — R41.89 COGNITIVE DEFICITS: ICD-10-CM

## 2023-09-05 DIAGNOSIS — I60.9 SUBARACHNOID HEMORRHAGE (HCC): ICD-10-CM

## 2023-09-05 DIAGNOSIS — I63.9 ACUTE ISCHEMIC STROKE (HCC): ICD-10-CM

## 2023-09-05 DIAGNOSIS — R41.82 ALTERED MENTAL STATUS, UNSPECIFIED ALTERED MENTAL STATUS TYPE: ICD-10-CM

## 2023-09-05 RX ORDER — TRAZODONE HYDROCHLORIDE 150 MG/1
150 TABLET ORAL NIGHTLY PRN
Qty: 30 TABLET | Refills: 0 | Status: SHIPPED | OUTPATIENT
Start: 2023-09-05

## 2023-09-05 RX ORDER — DIAZEPAM 10 MG/1
TABLET ORAL
Qty: 90 TABLET | Refills: 0 | Status: SHIPPED | OUTPATIENT
Start: 2023-09-05 | End: 2023-10-05

## 2023-09-05 RX ORDER — QUETIAPINE FUMARATE 25 MG/1
25 TABLET, FILM COATED ORAL 2 TIMES DAILY
Qty: 60 TABLET | Refills: 0 | Status: SHIPPED | OUTPATIENT
Start: 2023-09-05

## 2023-09-05 RX ORDER — DM/PE/ACETAMINOPHEN/CHLORPHENR 10-5-325-2
TABLET, SEQUENTIAL ORAL
Qty: 30 TABLET | Refills: 0 | Status: SHIPPED | OUTPATIENT
Start: 2023-09-05

## 2023-09-05 RX ORDER — OXYBUTYNIN CHLORIDE 5 MG/1
5 TABLET, EXTENDED RELEASE ORAL DAILY
Qty: 30 TABLET | Refills: 0 | Status: SHIPPED | OUTPATIENT
Start: 2023-09-05

## 2023-09-05 NOTE — TELEPHONE ENCOUNTER
Requested Prescriptions     Pending Prescriptions Disp Refills    oxybutynin (DITROPAN-XL) 5 MG extended release tablet [Pharmacy Med Name: OXYBUTYNIN CHLORIDE ER 5 MG 5 Tablet] 30 tablet 0     Sig: TAKE 1 TABLET BY MOUTH DAILY    GNP VITAMIN B-1 100 MG tablet [Pharmacy Med Name: GNP VITAMIN B-1 100 MG TABS 100 Tablet] 30 tablet 0     Sig: TAKE 1 TABLET BY MOUTH DAILY    QUEtiapine (SEROQUEL) 25 MG tablet [Pharmacy Med Name: QUETIAPINE 25 MG T 25 Tablet] 60 tablet 0     Sig: TAKE 1 TABLET BY MOUTH 2 TIMES DAILY    diazePAM (VALIUM) 10 MG tablet [Pharmacy Med Name: DIAZEPAM 10 MG TABS 10 Tablet] 90 tablet 0     Sig: TAKE 1 TABLET BY MOUTH 3 TIMES DAILY FOR 30 DAYS.     traZODone (DESYREL) 150 MG tablet [Pharmacy Med Name: TRAZODONE  MG TABS 150 Tablet] 30 tablet 0     Sig: TAKE 1 TABLET BY MOUTH NIGHTLY AS NEEDED FOR SLEEP       Last Office Visit:  Visit date not found  Next Office Visit:  10/5/2023  Last Medication Refill:  ALL 8/4/23  Helio up to date:  9/5/23    *RX updated to reflect   9/8/23  fill date*      [facility] pt

## 2023-09-22 ENCOUNTER — TRANSCRIBE ORDERS (OUTPATIENT)
Dept: ADMINISTRATIVE | Facility: HOSPITAL | Age: 68
End: 2023-09-22
Payer: MEDICARE

## 2023-09-22 DIAGNOSIS — S06.6XAA SUBARACHNOID HEMATOMA, WITH UNKNOWN LOSS OF CONSCIOUSNESS STATUS, INITIAL ENCOUNTER: Primary | ICD-10-CM

## 2023-10-05 ENCOUNTER — OFFICE VISIT (OUTPATIENT)
Dept: NEUROLOGY | Age: 68
End: 2023-10-05
Payer: MEDICARE

## 2023-10-05 VITALS
BODY MASS INDEX: 25.92 KG/M2 | HEART RATE: 71 BPM | DIASTOLIC BLOOD PRESSURE: 72 MMHG | SYSTOLIC BLOOD PRESSURE: 126 MMHG | HEIGHT: 69 IN | WEIGHT: 175 LBS

## 2023-10-05 DIAGNOSIS — I63.9 ACUTE ISCHEMIC STROKE (HCC): ICD-10-CM

## 2023-10-05 DIAGNOSIS — I60.9 SUBARACHNOID HEMORRHAGE (HCC): Primary | ICD-10-CM

## 2023-10-05 DIAGNOSIS — R41.89 COGNITIVE DEFICITS: ICD-10-CM

## 2023-10-05 DIAGNOSIS — Z09 HOSPITAL DISCHARGE FOLLOW-UP: ICD-10-CM

## 2023-10-05 DIAGNOSIS — R41.82 ALTERED MENTAL STATUS, UNSPECIFIED ALTERED MENTAL STATUS TYPE: ICD-10-CM

## 2023-10-05 DIAGNOSIS — F19.11 HISTORY OF DRUG ABUSE (HCC): ICD-10-CM

## 2023-10-05 DIAGNOSIS — S06.9X9S TRAUMATIC BRAIN INJURY WITH LOSS OF CONSCIOUSNESS, SEQUELA (HCC): ICD-10-CM

## 2023-10-05 PROCEDURE — 1111F DSCHRG MED/CURRENT MED MERGE: CPT | Performed by: PSYCHIATRY & NEUROLOGY

## 2023-10-05 PROCEDURE — 3017F COLORECTAL CA SCREEN DOC REV: CPT | Performed by: PSYCHIATRY & NEUROLOGY

## 2023-10-05 PROCEDURE — 99214 OFFICE O/P EST MOD 30 MIN: CPT | Performed by: PSYCHIATRY & NEUROLOGY

## 2023-10-05 PROCEDURE — G8484 FLU IMMUNIZE NO ADMIN: HCPCS | Performed by: PSYCHIATRY & NEUROLOGY

## 2023-10-05 PROCEDURE — 3074F SYST BP LT 130 MM HG: CPT | Performed by: PSYCHIATRY & NEUROLOGY

## 2023-10-05 PROCEDURE — 3078F DIAST BP <80 MM HG: CPT | Performed by: PSYCHIATRY & NEUROLOGY

## 2023-10-05 PROCEDURE — G8427 DOCREV CUR MEDS BY ELIG CLIN: HCPCS | Performed by: PSYCHIATRY & NEUROLOGY

## 2023-10-05 PROCEDURE — G8419 CALC BMI OUT NRM PARAM NOF/U: HCPCS | Performed by: PSYCHIATRY & NEUROLOGY

## 2023-10-05 PROCEDURE — 1123F ACP DISCUSS/DSCN MKR DOCD: CPT | Performed by: PSYCHIATRY & NEUROLOGY

## 2023-10-05 PROCEDURE — 4004F PT TOBACCO SCREEN RCVD TLK: CPT | Performed by: PSYCHIATRY & NEUROLOGY

## 2023-10-05 NOTE — PROGRESS NOTES
Review of Systems    Constitutional - No fever or chills. No diaphoresis or significant fatigue. HENT -  No tinnitus or significant hearing loss. Eyes - no sudden vision change or eye pain  Respiratory - no significant shortness of breath or cough  Cardiovascular - no chest pain No palpitations or significant leg swelling  Gastrointestinal - no abdominal swelling or pain. Genitourinary - No difficulty urinating, dysuria  Musculoskeletal - no back pain or myalgia. Skin - no color change or rash  Neurologic - No seizures. No lateralizing weakness. Hematologic - no easy bruising or excessive bleeding. Psychiatric - no severe anxiety or nervousness. All other review of systems are negative.
the defined types were placed in this encounter. Return if symptoms worsen or fail to improve. EMR Dragon/transcription disclaimer:Significant part of this  encounter note is electronic transcription/translation of spoken language to printed text. The electronic translation of spoken language may be erroneous, or at times, nonsensical words or phrases may be inadvertently transcribed.  Although I have reviewed the note for such errors, some may still exist.

## 2023-10-13 LAB
25(OH)D3 SERPL-MCNC: 38.1 NG/ML
ALBUMIN SERPL-MCNC: 3.9 G/DL (ref 3.5–5.2)
ALP SERPL-CCNC: 135 U/L (ref 40–130)
ALT SERPL-CCNC: 19 U/L (ref 5–41)
ANION GAP SERPL CALCULATED.3IONS-SCNC: 10 MMOL/L (ref 7–19)
AST SERPL-CCNC: 24 U/L (ref 5–40)
BASOPHILS # BLD: 0.1 K/UL (ref 0–0.2)
BASOPHILS NFR BLD: 0.6 % (ref 0–1)
BILIRUB SERPL-MCNC: 0.3 MG/DL (ref 0.2–1.2)
BUN SERPL-MCNC: 18 MG/DL (ref 8–23)
CALCIUM SERPL-MCNC: 9 MG/DL (ref 8.8–10.2)
CHLORIDE SERPL-SCNC: 105 MMOL/L (ref 98–111)
CO2 SERPL-SCNC: 25 MMOL/L (ref 22–29)
CREAT SERPL-MCNC: 1.3 MG/DL (ref 0.5–1.2)
EOSINOPHIL # BLD: 0.4 K/UL (ref 0–0.6)
EOSINOPHIL NFR BLD: 4 % (ref 0–5)
ERYTHROCYTE [DISTWIDTH] IN BLOOD BY AUTOMATED COUNT: 12.7 % (ref 11.5–14.5)
GLUCOSE SERPL-MCNC: 233 MG/DL (ref 74–109)
HCT VFR BLD AUTO: 38.9 % (ref 42–52)
HGB BLD-MCNC: 13.1 G/DL (ref 14–18)
IMM GRANULOCYTES # BLD: 0.1 K/UL
LYMPHOCYTES # BLD: 2 K/UL (ref 1.1–4.5)
LYMPHOCYTES NFR BLD: 21 % (ref 20–40)
MAGNESIUM SERPL-MCNC: 1.7 MG/DL (ref 1.6–2.4)
MCH RBC QN AUTO: 30.4 PG (ref 27–31)
MCHC RBC AUTO-ENTMCNC: 33.7 G/DL (ref 33–37)
MCV RBC AUTO: 90.3 FL (ref 80–94)
MONOCYTES # BLD: 0.6 K/UL (ref 0–0.9)
MONOCYTES NFR BLD: 6.8 % (ref 0–10)
NEUTROPHILS # BLD: 6.2 K/UL (ref 1.5–7.5)
NEUTS SEG NFR BLD: 66.6 % (ref 50–65)
PHOSPHATE SERPL-MCNC: 2.3 MG/DL (ref 2.5–4.5)
PLATELET # BLD AUTO: 220 K/UL (ref 130–400)
PMV BLD AUTO: 10.4 FL (ref 9.4–12.4)
POTASSIUM SERPL-SCNC: 4.1 MMOL/L (ref 3.5–5)
PROT SERPL-MCNC: 6.8 G/DL (ref 6.6–8.7)
PTH-INTACT SERPL-MCNC: 41.6 PG/ML (ref 15–65)
RBC # BLD AUTO: 4.31 M/UL (ref 4.7–6.1)
SODIUM SERPL-SCNC: 140 MMOL/L (ref 136–145)
URATE SERPL-MCNC: 6.7 MG/DL (ref 3.4–7)
WBC # BLD AUTO: 9.4 K/UL (ref 4.8–10.8)

## 2023-10-18 ENCOUNTER — HOSPITAL ENCOUNTER (OUTPATIENT)
Dept: CT IMAGING | Age: 68
Discharge: HOME | End: 2023-10-18
Payer: MEDICARE

## 2023-10-18 DIAGNOSIS — I60.9 SUBARACHNOID HEMORRHAGE (HCC): ICD-10-CM

## 2023-10-18 PROCEDURE — 70450 CT HEAD/BRAIN W/O DYE: CPT

## 2023-10-23 ENCOUNTER — TELEPHONE (OUTPATIENT)
Dept: NEUROLOGY | Age: 68
End: 2023-10-23

## 2023-10-23 NOTE — TELEPHONE ENCOUNTER
MD Triston Dunbar MA  Cc: Umer Michael  Can tell patient that CT head shows resolution of intracranial hemorrhage-ok to start baby aspirin for stroke prevention

## 2023-11-13 ENCOUNTER — TELEPHONE (OUTPATIENT)
Dept: NEUROLOGY | Age: 68
End: 2023-11-13

## 2023-11-13 NOTE — TELEPHONE ENCOUNTER
Received a referral from [facility], called patient to see if he needed to schedule a follow up with Dr. Karyna Dale since he has already been established. Patient declined an appointment, patient is aware to call the office if he changes his mind.

## 2023-11-14 NOTE — PROGRESS NOTES
PEDIATRIC NEUROLOGY     Patient Name: Shady Estes   MRN: [id number]    Date of Birth / Age / Sex: 2004 / 18 year old / female   Encounter Date: 11/14/23      I am seeing Shady Estes today in consultation for *** at the request of No ref. provider found.    Age:      18 year old  Handedness:      Chief Complaint:  No chief complaint on file.         History of Present Illness:   Shady Estes is a 18 year old *** who presents today as a new patient with *** for evaluation of ***.     17 yo F with no sig PMH presents with dizziness and leg weakness when walking x 1 week. Dizziness feels like \"my head is spinning\", does not feel like the room is spinning around her. When she walks, her legs feel weak. She also feels like she is swaying from side to side/is unsteady. She has not had any syncope or presyncope. Denies lightheadedness. Legs feel weak when she walks but does not feel weak when she is sitting/laying down. Cannot identify any exacerbating or relieving factors for dizziness.   No fever/URI symptoms/vomiting/diarrhea. Tolerating oral intake at baseline.  No chest pain/shortness of breath/palpitations   No weight loss/gain.  Does feel like she sweats a lot/is hot when other people are not as hot.   Went to PMD who suspected benign vertigo. Check hgb and it was normal. Saw dermatologist who did not think symptoms related to accutane which she has been taking since march.  No recent travel.  Denies history of sexual activity. Denies toxic habits.   LMP 2 weeks ago.        All lab work reviewed and returned within normal limits.  MRI was reviewed by radiology and read as normal.  On my exam, patient with 2+ symmetric reflexes, downward Babinski's, no finger-to-nose ataxia no focal weakness appreciated.  Call placed to on-call neurology who agreed with plan to discharge patient home with outpatient follow-up.  Recommended referring him to a vestibular therapist for further management of her  [facility]  INPATIENT SPEECH THERAPY  [facility] UNIT  TIME   [phone]  02 MINUTES    [x]Daily Note  []Progress Note    Date: 2023  Patient Name: Maria M Lazcano        MRN: [id number]    Account #: [de-identified]  : 1955  (79 y.o.)  Gender: male   Primary Provider: Laurie Rolon MD  Swallowing Status/Diet:  Liquid Consistency Recommendation: Thin  Diet Solids Recommendation: Easy to Chew    Subjective: Patient alert, cooperative, and upright in bed. No pain reported at this time. Objective:     Patient reports he is anxious to go home. He states he is needing to go home to take care of his girlfriend's 3year old granddaughter. Poor safety awareness noted, as patient reports he would be able to independently complete this task. Will notify . Patient reports he forgot where he is going after his d/c from rehab unit. Did remind patient of this. Patient verbalizes understanding. Poor carryover noted, as patient was informed of this plan on 23 and 23. No difficulties reported with swallowing solids, liquids, and medications at this time. Patient prefers to remain on easy to chew consistencies with thin liquids. Anomia noted at the conversational level. Patient does not independently use circumlocution strategy. Did assist patient with this task. Patient is unable to recall his girlfriends number or his brothers number at this time. Patient reports he does have contacts on his cell phone that he uses to look up numbers; however, his cell phone is not here. Recall task completed. Patient is provided a visual scene. Given 3 minute distracted time delay, patient is required to recall specific details from this scene. Task completed with 69% accuracy. Moderate semantic cues provided during this task. Problem solving task completed. Patient is provided a problem that could occur while completing ADLs. Patient is required to elicit a logical solution.  Task dizziness symptoms.  Mother pulled a side, separate from patient, and discussed that thus far the work-up is completely normal.  Asked if there are any stressors, anything going on at school or at home.  Mother replied that there were to usual teenage stressors of krystyna year, social media but nothing new or acute to suggest that patient's symptoms could be psychosomatic in nature.  I encouraged her to continue with referrals to the vestibular therapist as noted above and follow-up with outpatient neurology.  Recommending aggressive hydration, rest for the weekend and gradual return to routine activities.  Return precautions discussed. Patient discharged home in improved condition.    Development:   Gross motor: ***  Fine motor: ***  Language:***  Social:***  School name: *** Grade:  ***    Review of Laboratory Data, Images and Studies, and Medical Records:  Laboratory data, images and diagnostic studies and medical records were reviewed and relevant information is discussed in the assessment and plan.    Family History:         Consanguinity    None     Developmental delay   None     Learning disabilities   None     Migraine headache   None     Epilepsy    None     Neuromuscular disease   None     Movement disorders   None     Early stroke (under age 40 years) None  No family history on file.  ***      Social History:  Members of household:  ***         Review of Systems   With all of the above there is no blurry vision, no diplopia, no focal numbness or weakness, no positional quality, no nighttime awakening with emesis, no headache, no photophobia, no phonophobia, no loss of vision, no tinnitus, no dysarthria/slurred speech, no ataxia, no spots/scotomas, no periods of nonresponsiveness, no incontinence, no tremors, no nausea, no vomiting, no dizziness, no vertiginous symptoms, no nystagmus, no worsening of symptoms with activity, no neurocutaneous stigmata.      Birth History  {[id number]}   ***     Past  Medical History:   Negative for traumatic brain injury, concussion, CNS infection, episodes of unexplained alteration in consciousness or loss of consciousness  No past medical history on file.       Past Surgical History: No past surgical history on file.         Medications:   No current outpatient medications on file.     No current facility-administered medications for this visit.          Physical Examination:  Vital signs:    Visit Vitals  [facility] 10/30/2023 (Exact Date)     Head circumference  *** cm  HC percentile No head circumference on file for this encounter.    General appearance:   Alert, well-looking  Dysmorphisms, Asymmetries:  No dysmorphic features and no asymmetries  Skin, Spine, Skull:   No hypo- or hyperpigmented macules       Normal spine and skull  Ears, Nose, Mouth, Throat:  No nasal discharge  Neurologic:  Behavior, Mental status:  Interactive and cooperative       Oriented to time, place and person       Normal attention span and concentration       Normal language and fund of knowledge ***  Cranial nerves: II:  BELKIS. Normal vision, visual fields and optic disks     III, IV, VI: Normal EOM, no ptosis     V:  Normal facial sensation     VII:  No facial weakness     VIII:  Normal hearing     IX, X:  Normal swallowing and phonation     XI:  Normal shoulder shrug     XII:  Tongue protrudes in midline  Motor:  Normal muscle bulk, strength and tone in arms and legs    No tremor or dystonia in arms or legs    Normal gait  Coordination: Normal finger to nose in arms and heel to shin in legs  Reflexes:   Right Left   Biceps:  2+ 2+   Triceps:  2+ 2+   Brachioradialis: 2+ 2+   Patella:   2+ 2+   Achilles tendon: 2+ 2+   Plantars:  Flexor Flexor  Sensory: Normal light touch, temperature, vibration and position sense      Assessment:     Shady Estes is a 18 year old who presents today for ***. ***     Plan    Return:        I discussed my impression and recommendations above with the family, who  verbalized understanding. No barrier to learning was identified.    Thank you for involving me in the care of your patient.    On 11/14/23 I spent *** minutes on this encounter, including time spent in review of records, direct communication with the patient and family, coordination of care and documentation.      Jose Dooley DO  Pediatric Neurology and Epilepsy  [facility]

## 2024-02-13 ENCOUNTER — TELEPHONE (OUTPATIENT)
Dept: VASCULAR SURGERY | Facility: CLINIC | Age: 69
End: 2024-02-13
Payer: MEDICARE

## 2024-02-14 ENCOUNTER — APPOINTMENT (OUTPATIENT)
Dept: ULTRASOUND IMAGING | Facility: HOSPITAL | Age: 69
End: 2024-02-14
Payer: MEDICARE

## 2024-02-14 ENCOUNTER — OFFICE VISIT (OUTPATIENT)
Dept: VASCULAR SURGERY | Facility: CLINIC | Age: 69
End: 2024-02-14
Payer: MEDICARE

## 2024-02-14 ENCOUNTER — HOSPITAL ENCOUNTER (OUTPATIENT)
Dept: ULTRASOUND IMAGING | Facility: HOSPITAL | Age: 69
Discharge: HOME | End: 2024-02-14
Payer: MEDICARE

## 2024-02-14 VITALS
HEART RATE: 80 BPM | WEIGHT: 195 LBS | SYSTOLIC BLOOD PRESSURE: 160 MMHG | BODY MASS INDEX: 30.61 KG/M2 | DIASTOLIC BLOOD PRESSURE: 82 MMHG | HEIGHT: 67 IN

## 2024-02-14 DIAGNOSIS — I73.9 PAD (PERIPHERAL ARTERY DISEASE): ICD-10-CM

## 2024-02-14 DIAGNOSIS — I74.09 AORTOILIAC OCCLUSIVE DISEASE: ICD-10-CM

## 2024-02-14 DIAGNOSIS — I65.22 CAROTID OCCLUSION, LEFT: ICD-10-CM

## 2024-02-14 DIAGNOSIS — E78.2 MIXED HYPERLIPIDEMIA: ICD-10-CM

## 2024-02-14 DIAGNOSIS — I65.21 STENOSIS OF RIGHT CAROTID ARTERY: ICD-10-CM

## 2024-02-14 DIAGNOSIS — I65.21 STENOSIS OF RIGHT CAROTID ARTERY: Primary | ICD-10-CM

## 2024-02-14 DIAGNOSIS — Z72.0 TOBACCO ABUSE: ICD-10-CM

## 2024-02-14 DIAGNOSIS — I10 ESSENTIAL HYPERTENSION: ICD-10-CM

## 2024-02-14 PROCEDURE — 3077F SYST BP >= 140 MM HG: CPT | Performed by: NURSE PRACTITIONER

## 2024-02-14 PROCEDURE — 93880 EXTRACRANIAL BILAT STUDY: CPT

## 2024-02-14 PROCEDURE — 1159F MED LIST DOCD IN RCRD: CPT | Performed by: NURSE PRACTITIONER

## 2024-02-14 PROCEDURE — 3079F DIAST BP 80-89 MM HG: CPT | Performed by: NURSE PRACTITIONER

## 2024-02-14 PROCEDURE — 93923 UPR/LXTR ART STDY 3+ LVLS: CPT

## 2024-02-14 PROCEDURE — 99214 OFFICE O/P EST MOD 30 MIN: CPT | Performed by: NURSE PRACTITIONER

## 2024-02-14 PROCEDURE — 1160F RVW MEDS BY RX/DR IN RCRD: CPT | Performed by: NURSE PRACTITIONER

## 2024-02-14 RX ORDER — CLOPIDOGREL BISULFATE 75 MG/1
1 TABLET ORAL DAILY
COMMUNITY

## 2024-02-14 NOTE — PROGRESS NOTES
"2/14/2024     Yaa Fox, APRN  [address] [address]        Campbell Casas  1955    Chief Complaint   Patient presents with    Follow-up     6 month follow up w/ testing. Last seen 8/10/23. Patient states that he is having many more dizzy spells than before, also pain in legs while walking. Patient left meds at home and was unsure of what all he takes.        Dear Yaa Fox, APRN:    HPI     I had the pleasure of seeing you patient in the office today for follow up.  As you recall, the patient is a 69 y.o. male who we are currently following for routine health maintenance.  He was having complaints of pain to his legs for the past couple of years.  He really describes bilateral hip and buttock pain and reports they lock up on him.  He did undergo an aortoiliac angiogram with intravascular lithotripsy of the right common femoral and proximal small superficial femoral arteries with lithotripsy of the right and left external iliac arteries.  He also underwent a left common femoral endarterectomy on 7/13/2022.  He has having more dizzy spells than he was having before.  He complains of pain in his legs while walking.  He is maintained on Plavix, Tricor, and Lipitor.  Unfortunately continues to smoke but reports he is down to 14 cigarettes/day.  He did have noninvasive testing performed today, which I did review in office.      Review of Systems   Constitutional: Negative.    HENT: Negative.     Eyes: Negative.    Respiratory: Negative.     Cardiovascular: Negative.         Efra hip/buttock pain   Gastrointestinal: Negative.    Endocrine: Negative.    Genitourinary: Negative.    Musculoskeletal: Negative.    Skin: Negative.    Allergic/Immunologic: Negative.    Neurological:  Positive for syncope and light-headedness.   Hematological: Negative.    Psychiatric/Behavioral: Negative.     All other systems reviewed and are negative.       /82   Pulse 80   Ht 170.2 cm (67\")   Wt 88.5 kg (195 " lb)   BMI 30.54 kg/m²   Physical Exam  Vitals and nursing note reviewed.   Constitutional:       Appearance: Normal appearance. He is well-developed and overweight.   HENT:      Head: Normocephalic and atraumatic.   Eyes:      General: No scleral icterus.     Pupils: Pupils are equal, round, and reactive to light.   Neck:      Thyroid: No thyromegaly.      Vascular: No carotid bruit or JVD.   Cardiovascular:      Rate and Rhythm: Normal rate and regular rhythm.      Pulses:           Carotid pulses are 2+ on the right side and 2+ on the left side.       Femoral pulses are 2+ on the right side and 2+ on the left side.       Popliteal pulses are 2+ on the right side and 2+ on the left side.        Dorsalis pedis pulses are detected w/ Doppler on the right side and detected w/ Doppler on the left side.        Posterior tibial pulses are detected w/ Doppler on the right side and detected w/ Doppler on the left side.      Heart sounds: Normal heart sounds.   Pulmonary:      Effort: Pulmonary effort is normal.      Breath sounds: Normal breath sounds.   Abdominal:      General: Bowel sounds are normal. There is no distension or abdominal bruit.      Palpations: Abdomen is soft. There is no mass.      Tenderness: There is no abdominal tenderness.   Musculoskeletal:         General: Normal range of motion.      Cervical back: Neck supple.   Lymphadenopathy:      Cervical: No cervical adenopathy.   Skin:     General: Skin is warm and dry.   Neurological:      General: No focal deficit present.      Mental Status: He is alert and oriented to person, place, and time.      Cranial Nerves: No cranial nerve deficit.      Sensory: No sensory deficit.   Psychiatric:         Mood and Affect: Mood normal.         Behavior: Behavior normal. Behavior is cooperative.         Thought Content: Thought content normal.         Judgment: Judgment normal.      DIAGNOSTIC DATA:  Noninvasive testing including a carotid duplex shows 70 to 99%  right carotid stenosis and a known left carotid occlusion.  ABIs show mild arterial insufficiency to the right and moderate arterial insufficiency to the left lower extremity.    Patient Active Problem List   Diagnosis    Tobacco abuse    Essential hypertension    Other hyperlipidemia    PAD (peripheral artery disease)    Aortoiliac occlusive disease    Preop testing    CKD (chronic kidney disease) stage 4, GFR 15-29 ml/min    Coronary artery disease of native artery of native heart with stable angina pectoris    Ischemic cardiomyopathy    Overweight with body mass index (BMI) of 29 to 29.9 in adult    Carotid occlusion, left    Stenosis of right carotid artery         ICD-10-CM ICD-9-CM   1. Stenosis of right carotid artery  I65.21 433.10   2. Carotid occlusion, left  I65.22 433.10   3. Tobacco abuse  Z72.0 305.1   4. Essential hypertension  I10 401.9   5. Mixed hyperlipidemia  E78.2 272.2   6. PAD (peripheral artery disease)  I73.9 443.9   7. Aortoiliac occlusive disease  I74.09 444.09             PLAN: After thoroughly evaluating Campbell Casas, I believe the best course of action is to proceed with a CTA of the neck for further evaluation.  I did review his testing which shows. 70-99% stenosis of the right carotid carotid and known left carotid occlusion. We will see him back in 2 weeks to review his testing.   I did discuss vascular risk factors as they pertain to the progression of vascular disease including controlling his hypertension and hyperlipidemia.  His blood pressure is elevated at 160/82.  He should continue on his Plavix 75 mg daily, Lipitor 80 mg daily and Tricor 54 mg daily in addition to his other medications.  Unfortunately he is a current daily smoker but reports he has cut back to 14 cigarettes/day.  This was all discussed in full with complete understanding.  Thank you for allowing me to participate in the care of your patient.  Please do not hesitate to call with any questions or concerns.   We will keep you aware of any further encounters with Campbell Casas.      Sincerely Yours,      CARYN Boyd

## 2024-02-26 DIAGNOSIS — I65.21 STENOSIS OF RIGHT CAROTID ARTERY: Primary | ICD-10-CM

## 2024-03-04 ENCOUNTER — TELEPHONE (OUTPATIENT)
Dept: VASCULAR SURGERY | Facility: CLINIC | Age: 69
End: 2024-03-04
Payer: MEDICARE

## 2024-03-11 ENCOUNTER — TELEPHONE (OUTPATIENT)
Dept: VASCULAR SURGERY | Facility: CLINIC | Age: 69
End: 2024-03-11
Payer: MEDICARE

## 2024-03-12 ENCOUNTER — OFFICE VISIT (OUTPATIENT)
Dept: VASCULAR SURGERY | Facility: CLINIC | Age: 69
End: 2024-03-12
Payer: MEDICARE

## 2024-03-12 ENCOUNTER — HOSPITAL ENCOUNTER (OUTPATIENT)
Dept: CT IMAGING | Facility: HOSPITAL | Age: 69
Discharge: HOME | End: 2024-03-12
Admitting: NURSE PRACTITIONER
Payer: MEDICARE

## 2024-03-12 VITALS
HEIGHT: 67 IN | WEIGHT: 192 LBS | DIASTOLIC BLOOD PRESSURE: 78 MMHG | HEART RATE: 88 BPM | OXYGEN SATURATION: 94 % | SYSTOLIC BLOOD PRESSURE: 142 MMHG | BODY MASS INDEX: 30.13 KG/M2

## 2024-03-12 DIAGNOSIS — Z72.0 TOBACCO ABUSE: ICD-10-CM

## 2024-03-12 DIAGNOSIS — I10 ESSENTIAL HYPERTENSION: ICD-10-CM

## 2024-03-12 DIAGNOSIS — I65.21 STENOSIS OF RIGHT CAROTID ARTERY: ICD-10-CM

## 2024-03-12 DIAGNOSIS — I65.22 CAROTID OCCLUSION, LEFT: ICD-10-CM

## 2024-03-12 DIAGNOSIS — E78.49 OTHER HYPERLIPIDEMIA: ICD-10-CM

## 2024-03-12 DIAGNOSIS — I65.21 STENOSIS OF RIGHT CAROTID ARTERY: Primary | ICD-10-CM

## 2024-03-12 DIAGNOSIS — I74.09 AORTOILIAC OCCLUSIVE DISEASE: ICD-10-CM

## 2024-03-12 PROBLEM — Z01.818 PREOP TESTING: Status: RESOLVED | Noted: 2022-06-06 | Resolved: 2024-03-12

## 2024-03-12 LAB — CREAT BLDA-MCNC: 1.6 MG/DL (ref 0.6–1.3)

## 2024-03-12 PROCEDURE — 3078F DIAST BP <80 MM HG: CPT | Performed by: NURSE PRACTITIONER

## 2024-03-12 PROCEDURE — 82565 ASSAY OF CREATININE: CPT

## 2024-03-12 PROCEDURE — 25510000001 IOPAMIDOL PER 1 ML: Performed by: NURSE PRACTITIONER

## 2024-03-12 PROCEDURE — 1160F RVW MEDS BY RX/DR IN RCRD: CPT | Performed by: NURSE PRACTITIONER

## 2024-03-12 PROCEDURE — 99214 OFFICE O/P EST MOD 30 MIN: CPT | Performed by: NURSE PRACTITIONER

## 2024-03-12 PROCEDURE — 1159F MED LIST DOCD IN RCRD: CPT | Performed by: NURSE PRACTITIONER

## 2024-03-12 PROCEDURE — 3077F SYST BP >= 140 MM HG: CPT | Performed by: NURSE PRACTITIONER

## 2024-03-12 PROCEDURE — 70498 CT ANGIOGRAPHY NECK: CPT

## 2024-03-12 RX ORDER — ASPIRIN 81 MG/1
81 TABLET ORAL DAILY
Start: 2024-03-12

## 2024-03-12 RX ADMIN — IOPAMIDOL 100 ML: 755 INJECTION, SOLUTION INTRAVENOUS at 13:48

## 2024-03-12 NOTE — LETTER
March 12, 2024     CARYN Chaves  [address] [address]    Patient: Campbell Casas   YOB: 1955   Date of Visit: 3/12/2024     Dear CARYN Chaves:       Thank you for referring Campbell Casas to me for evaluation. Below are the relevant portions of my assessment and plan of care.    If you have questions, please do not hesitate to call me. I look forward to following Campbell along with you.         Sincerely,        Esequiel Vaz, DO        CC: No Recipients    Reyna Lopez, CARYN  03/12/24 1556  Sign when Signing Visit  3/12/2024     Yaa Fox APRN  [address] [address]        Campbell Casas  1955    Chief Complaint   Patient presents with   • Follow-up     2 week follow up w/ testing. Last seen 2/14/24. Patient states that dizziness is getting worse, can barely walk at times.       Dear Yaa Fox APRN:    HPI     I had the pleasure of seeing you patient in the office today for follow up.  As you recall, the patient is a 69 y.o. male who we are currently following for routine health maintenance.  He was having complaints of pain to his legs for the past couple of years.  He really describes bilateral hip and buttock pain and reports they lock up on him.  He did undergo an aortoiliac angiogram with intravascular lithotripsy of the right common femoral and proximal small superficial femoral arteries with lithotripsy of the right and left external iliac arteries.  He also underwent a left common femoral endarterectomy on 7/13/2022.  He has having more dizzy spells than he was having before.  He reports he can barely walk at times.  Unfortunately continues to smoke but reports he is down to 14 cigarettes/day.  He did have noninvasive testing performed today, which I did review in office.    Review of Systems   Constitutional: Negative.    HENT: Negative.     Eyes: Negative.    Respiratory: Negative.     Cardiovascular: Negative.         Efra  "hip/buttock pain   Gastrointestinal: Negative.    Endocrine: Negative.    Genitourinary: Negative.    Musculoskeletal: Negative.    Skin: Negative.    Allergic/Immunologic: Negative.    Neurological:  Positive for dizziness, syncope and light-headedness.   Hematological: Negative.    Psychiatric/Behavioral: Negative.     All other systems reviewed and are negative.       /78   Pulse 88   Ht 170.2 cm (67\")   Wt 87.1 kg (192 lb)   SpO2 94%   BMI 30.07 kg/m²   Physical Exam  Vitals and nursing note reviewed.   Constitutional:       Appearance: Normal appearance. He is well-developed and overweight.   HENT:      Head: Normocephalic and atraumatic.   Eyes:      General: No scleral icterus.     Pupils: Pupils are equal, round, and reactive to light.   Neck:      Thyroid: No thyromegaly.      Vascular: No carotid bruit or JVD.   Cardiovascular:      Rate and Rhythm: Normal rate and regular rhythm.      Pulses:           Carotid pulses are 2+ on the right side and 2+ on the left side.       Femoral pulses are 2+ on the right side and 2+ on the left side.       Popliteal pulses are 2+ on the right side and 2+ on the left side.        Dorsalis pedis pulses are detected w/ Doppler on the right side and detected w/ Doppler on the left side.        Posterior tibial pulses are detected w/ Doppler on the right side and detected w/ Doppler on the left side.      Heart sounds: Normal heart sounds.   Pulmonary:      Effort: Pulmonary effort is normal.      Breath sounds: Normal breath sounds.   Abdominal:      General: Bowel sounds are normal. There is no distension or abdominal bruit.      Palpations: Abdomen is soft. There is no mass.      Tenderness: There is no abdominal tenderness.   Musculoskeletal:         General: Normal range of motion.      Cervical back: Neck supple.   Lymphadenopathy:      Cervical: No cervical adenopathy.   Skin:     General: Skin is warm and dry.   Neurological:      General: No focal deficit " present.      Mental Status: He is alert and oriented to person, place, and time.      Cranial Nerves: No cranial nerve deficit.      Sensory: No sensory deficit.   Psychiatric:         Mood and Affect: Mood normal.         Behavior: Behavior normal. Behavior is cooperative.         Thought Content: Thought content normal.         Judgment: Judgment normal.        DIAGNOSTIC DATA:      CT Angiogram Neck    Result Date: 3/12/2024  Narrative: EXAMINATION: CT ANGIOGRAM NECK- 3/12/2024 1:58 PM  HISTORY: Carotid artery stenosis; I65.21-Occlusion and stenosis of right carotid artery. Known occlusion of the left internal carotid artery.  TOTAL DOSE: 312.55 mGy.cm (Automatic exposure control technique was implemented in an effort to keep the radiation dose as low as possible without compromising image quality)  REPORT: Spiral CT of the neck was performed after administration of intravenous contrast using CTA protocol, which includes reconstructed coronal, sagittal and 3D images.  COMPARISON: Ultrasound of the carotid arteries 2/14/2024.  Grading of carotid stenosis performed with NASCET criteria.  Review of the lung windows demonstrates normal aeration of the visualized upper lungs. The visualized thoracic aortic arch is normal in caliber, there is small volume of calcified plaque within the proximal arch. No dissection is identified. Motion artifact limits evaluation of the origins of the great vessels. However, there is noncalcified plaque and/or thrombus at the origin of the innominate artery, which contributes to approximately 50 to 60% stenosis of the vessel. This is seen on image 105 of series 5. There is focal stenosis at the origin of the right common carotid artery over a short segment, with approximately 65% narrowing this is seen on image 98 series 5. And image 32 series 7. There is moderate calcified and noncalcified plaque at the right carotid bulb and origin of the right ICA, the smallest patent diameter of  the proximal right ICA measures 2.4 mm image 52 series 5, this corresponds with 50% stenosis. No right carotid dissection is identified. The right external carotid artery is patent. There is a small volume of calcified plaque within the distal ICA at the cavernous segment.  On the left, the common carotid artery appears patent, normal in caliber. The left internal carotid artery is occluded approximately 1 cm distal to its origin and the vessel is briefly reconstituted at its cavernous segment but there is extensive atherosclerosis of the cavernous segment with high-grade narrowing. The terminus of the left ICA is occluded. There is a 4 x 5 mm focus of apparent calcified plaque at the terminus of the vessel. A portion of the [place] is included in the field-of-view, the right middle cerebral artery appears irregular and very small in caliber. Dedicated CTA of the head is recommended for full evaluation.  Both vertebral arteries are patent, the left vertebral artery is dominant compared to the right. No vertebral dissection is identified. Bilateral PICA appear patent. The basilar artery is patent but not fully imaged to its tip. Soft tissue windows demonstrate normal appearance of the thyroid gland. The airway is patent. No cervical lymphadenopathy is identified. Review of bone windows shows no acute osseous abnormality.      Impression: 1. Occlusion of the left internal carotid artery beginning approximately 1 cm distal to its origin, with a small reconstituted segment at the cavernous sinus level, compatible with known left carotid artery occlusion. 2. Attenuation of the left middle cerebral artery beginning at its origin and more distally, there is limited visualization of the left MCA with the field-of-view obtained. Consider follow-up dedicated CTA of the head for full evaluation. 3. 50% stenosis of the right internal carotid artery, associated with calcified plaque, which extends over segment measuring  approximately 18 mm in length. 4. Noncalcified plaque and/or chronic thrombus at the origin of the innominate artery contributing to approximately 50 to 60% stenosis. 5. Focal stenosis at the origin of the right common carotid artery over a short segment, estimated at 65%. 6. Patency of the vertebral arteries without flow-limiting stenosis, the left vertebral artery is dominant compared to the right.  This report was signed and finalized on 3/12/2024 2:16 PM by Dr. Luis F Ramirez MD.      US Carotid Bilateral    Result Date: 2/14/2024  Narrative: History: Carotid occlusive disease      Impression: Impression: 1. There is 50 to 69% stenosis of the right internal carotid artery. 2. There is known occlusion of the left internal carotid artery. 3. Antegrade flow is demonstrated in bilateral vertebral arteries.  Comments: Bilateral carotid vertebral arterial duplex scan was performed.  Grayscale imaging shows intimal thickening and calcified elements at the carotid bifurcation. The right internal carotid artery peak systolic velocity is 323.5 cm/sec. The end-diastolic velocity is 88 cm/sec. The right ICA/CCA ratio is approximately 3.6. These findings correlate with 50 to 69% stenosis of the right internal carotid artery.  There is a known occlusion of the left internal carotid artery.  Antegrade flow is demonstrated in bilateral vertebral arteries. There is greater than 50% stenosis in bilateral external carotid arteries.  This report was signed and finalized on 2/14/2024 2:54 PM by Dr. Esequiel Vaz MD.      US Ankle / Brachial Indices Extremity Complete    Result Date: 2/14/2024  Narrative:  History: PAD  Comments: Bilateral lower extremity arterial with multi-level pulse volume recordings and segmental pressures were performed at rest and stress.  The right ankle/brachial index is 0.86. The waveforms are biphasic without dampening. These findings are consistent with mild arterial insufficiency of the right lower  extremity at rest.  The left ankle/brachial index is 0.63. The waveforms are biphasic without dampening. These findings are consistent with moderate arterial insufficiency of the left lower extremity at rest.      Impression: Impression: 1. Mild arterial insufficiency of the right lower extremity at rest. 2. Moderate arterial insufficiency of the left lower extremity at rest.    This report was signed and finalized on 2/14/2024 2:37 PM by Dr. Esequiel Vaz MD.        Noninvasive testing including a carotid duplex shows 70 to 99% right carotid stenosis and a known left carotid occlusion.  ABIs show mild arterial insufficiency to the right and moderate arterial insufficiency to the left lower extremity.    Patient Active Problem List   Diagnosis   • Tobacco abuse   • Essential hypertension   • Other hyperlipidemia   • PAD (peripheral artery disease)   • Aortoiliac occlusive disease   • CKD (chronic kidney disease) stage 4, GFR 15-29 ml/min   • Coronary artery disease of native artery of native heart with stable angina pectoris   • Ischemic cardiomyopathy   • Overweight with body mass index (BMI) of 29 to 29.9 in adult   • Carotid occlusion, left   • Stenosis of right carotid artery         ICD-10-CM ICD-9-CM   1. Stenosis of right carotid artery  I65.21 433.10   2. Carotid occlusion, left  I65.22 433.10   3. Essential hypertension  I10 401.9   4. Other hyperlipidemia  E78.49 272.4   5. Aortoiliac occlusive disease  I74.09 444.09   6. Tobacco abuse  Z72.0 305.1               PLAN: After thoroughly evaluating Campbell Casas, I proceeded with right transcarotid artery revascularization for stroke risk reduction.  I did review his testing as well as review with Dr. Vaz and is noted to have 80 to 85% focal right carotid stenosis.  Risks of transcarotid artery revascularization include, but are not limited to, bleeding, infection, vessel damage, nerve damage, MI, stroke, and death.  The patient understands these  risks and would like to proceed with the procedure.  I will need to add aspirin 81 mg daily in addition to his Plavix and Lipitor that he will continue uninterrupted prior to surgery.  He is due for follow-up with cardiology who will reach out to get him set up for cardiac restratification.  I did discuss vascular risk factors as they pertain to the progression of vascular disease including controlling his hypertension and hyperlipidemia.  His blood pressure is elevated at 160/82.  He should continue on his Plavix 75 mg daily, Lipitor 80 mg daily and Tricor 54 mg daily in addition to his other medications.  Unfortunately he is a current daily smoker but reports he has cut back to 14 cigarettes/day.  This was all discussed in full with complete understanding.  Thank you for allowing me to participate in the care of your patient.  Please do not hesitate to call with any questions or concerns.  We will keep you aware of any further encounters with Campbell Casas.      Sincerely Yours,      CARYN Boyd

## 2024-03-12 NOTE — PROGRESS NOTES
"3/12/2024     Yaa Fox, APRN  [address] [address]        Campbell Casas  1955    Chief Complaint   Patient presents with    Follow-up     2 week follow up w/ testing. Last seen 2/14/24. Patient states that dizziness is getting worse, can barely walk at times.       Dear Yaa Fox APRN:    HPI     I had the pleasure of seeing you patient in the office today for follow up.  As you recall, the patient is a 69 y.o. male who we are currently following for routine health maintenance.  He was having complaints of pain to his legs for the past couple of years.  He really describes bilateral hip and buttock pain and reports they lock up on him.  He did undergo an aortoiliac angiogram with intravascular lithotripsy of the right common femoral and proximal small superficial femoral arteries with lithotripsy of the right and left external iliac arteries.  He also underwent a left common femoral endarterectomy on 7/13/2022.  He has having more dizzy spells than he was having before.  He reports he can barely walk at times.  Unfortunately continues to smoke but reports he is down to 14 cigarettes/day.  He did have noninvasive testing performed today, which I did review in office.    Review of Systems   Constitutional: Negative.    HENT: Negative.     Eyes: Negative.    Respiratory: Negative.     Cardiovascular: Negative.         Efra hip/buttock pain   Gastrointestinal: Negative.    Endocrine: Negative.    Genitourinary: Negative.    Musculoskeletal: Negative.    Skin: Negative.    Allergic/Immunologic: Negative.    Neurological:  Positive for dizziness, syncope and light-headedness.   Hematological: Negative.    Psychiatric/Behavioral: Negative.     All other systems reviewed and are negative.       /78   Pulse 88   Ht 170.2 cm (67\")   Wt 87.1 kg (192 lb)   SpO2 94%   BMI 30.07 kg/m²   Physical Exam  Vitals and nursing note reviewed.   Constitutional:       Appearance: Normal appearance. " He is well-developed and overweight.   HENT:      Head: Normocephalic and atraumatic.   Eyes:      General: No scleral icterus.     Pupils: Pupils are equal, round, and reactive to light.   Neck:      Thyroid: No thyromegaly.      Vascular: No carotid bruit or JVD.   Cardiovascular:      Rate and Rhythm: Normal rate and regular rhythm.      Pulses:           Carotid pulses are 2+ on the right side and 2+ on the left side.       Femoral pulses are 2+ on the right side and 2+ on the left side.       Popliteal pulses are 2+ on the right side and 2+ on the left side.        Dorsalis pedis pulses are detected w/ Doppler on the right side and detected w/ Doppler on the left side.        Posterior tibial pulses are detected w/ Doppler on the right side and detected w/ Doppler on the left side.      Heart sounds: Normal heart sounds.   Pulmonary:      Effort: Pulmonary effort is normal.      Breath sounds: Normal breath sounds.   Abdominal:      General: Bowel sounds are normal. There is no distension or abdominal bruit.      Palpations: Abdomen is soft. There is no mass.      Tenderness: There is no abdominal tenderness.   Musculoskeletal:         General: Normal range of motion.      Cervical back: Neck supple.   Lymphadenopathy:      Cervical: No cervical adenopathy.   Skin:     General: Skin is warm and dry.   Neurological:      General: No focal deficit present.      Mental Status: He is alert and oriented to person, place, and time.      Cranial Nerves: No cranial nerve deficit.      Sensory: No sensory deficit.   Psychiatric:         Mood and Affect: Mood normal.         Behavior: Behavior normal. Behavior is cooperative.         Thought Content: Thought content normal.         Judgment: Judgment normal.        DIAGNOSTIC DATA:      CT Angiogram Neck    Result Date: 3/12/2024  Narrative: EXAMINATION: CT ANGIOGRAM NECK- 3/12/2024 1:58 PM  HISTORY: Carotid artery stenosis; I65.21-Occlusion and stenosis of right carotid  artery. Known occlusion of the left internal carotid artery.  TOTAL DOSE: 312.55 mGy.cm (Automatic exposure control technique was implemented in an effort to keep the radiation dose as low as possible without compromising image quality)  REPORT: Spiral CT of the neck was performed after administration of intravenous contrast using CTA protocol, which includes reconstructed coronal, sagittal and 3D images.  COMPARISON: Ultrasound of the carotid arteries 2/14/2024.  Grading of carotid stenosis performed with NASCET criteria.  Review of the lung windows demonstrates normal aeration of the visualized upper lungs. The visualized thoracic aortic arch is normal in caliber, there is small volume of calcified plaque within the proximal arch. No dissection is identified. Motion artifact limits evaluation of the origins of the great vessels. However, there is noncalcified plaque and/or thrombus at the origin of the innominate artery, which contributes to approximately 50 to 60% stenosis of the vessel. This is seen on image 105 of series 5. There is focal stenosis at the origin of the right common carotid artery over a short segment, with approximately 65% narrowing this is seen on image 98 series 5. And image 32 series 7. There is moderate calcified and noncalcified plaque at the right carotid bulb and origin of the right ICA, the smallest patent diameter of the proximal right ICA measures 2.4 mm image 52 series 5, this corresponds with 50% stenosis. No right carotid dissection is identified. The right external carotid artery is patent. There is a small volume of calcified plaque within the distal ICA at the cavernous segment.  On the left, the common carotid artery appears patent, normal in caliber. The left internal carotid artery is occluded approximately 1 cm distal to its origin and the vessel is briefly reconstituted at its cavernous segment but there is extensive atherosclerosis of the cavernous segment with high-grade  narrowing. The terminus of the left ICA is occluded. There is a 4 x 5 mm focus of apparent calcified plaque at the terminus of the vessel. A portion of the [place] is included in the field-of-view, the right middle cerebral artery appears irregular and very small in caliber. Dedicated CTA of the head is recommended for full evaluation.  Both vertebral arteries are patent, the left vertebral artery is dominant compared to the right. No vertebral dissection is identified. Bilateral PICA appear patent. The basilar artery is patent but not fully imaged to its tip. Soft tissue windows demonstrate normal appearance of the thyroid gland. The airway is patent. No cervical lymphadenopathy is identified. Review of bone windows shows no acute osseous abnormality.      Impression: 1. Occlusion of the left internal carotid artery beginning approximately 1 cm distal to its origin, with a small reconstituted segment at the cavernous sinus level, compatible with known left carotid artery occlusion. 2. Attenuation of the left middle cerebral artery beginning at its origin and more distally, there is limited visualization of the left MCA with the field-of-view obtained. Consider follow-up dedicated CTA of the head for full evaluation. 3. 50% stenosis of the right internal carotid artery, associated with calcified plaque, which extends over segment measuring approximately 18 mm in length. 4. Noncalcified plaque and/or chronic thrombus at the origin of the innominate artery contributing to approximately 50 to 60% stenosis. 5. Focal stenosis at the origin of the right common carotid artery over a short segment, estimated at 65%. 6. Patency of the vertebral arteries without flow-limiting stenosis, the left vertebral artery is dominant compared to the right.  This report was signed and finalized on 3/12/2024 2:16 PM by Dr. Luis F Ramirez MD.      US Carotid Bilateral    Result Date: 2/14/2024  Narrative: History: Carotid  occlusive disease      Impression: Impression: 1. There is 50 to 69% stenosis of the right internal carotid artery. 2. There is known occlusion of the left internal carotid artery. 3. Antegrade flow is demonstrated in bilateral vertebral arteries.  Comments: Bilateral carotid vertebral arterial duplex scan was performed.  Grayscale imaging shows intimal thickening and calcified elements at the carotid bifurcation. The right internal carotid artery peak systolic velocity is 323.5 cm/sec. The end-diastolic velocity is 88 cm/sec. The right ICA/CCA ratio is approximately 3.6. These findings correlate with 50 to 69% stenosis of the right internal carotid artery.  There is a known occlusion of the left internal carotid artery.  Antegrade flow is demonstrated in bilateral vertebral arteries. There is greater than 50% stenosis in bilateral external carotid arteries.  This report was signed and finalized on 2/14/2024 2:54 PM by Dr. Esequiel Vaz MD.      US Ankle / Brachial Indices Extremity Complete    Result Date: 2/14/2024  Narrative:  History: PAD  Comments: Bilateral lower extremity arterial with multi-level pulse volume recordings and segmental pressures were performed at rest and stress.  The right ankle/brachial index is 0.86. The waveforms are biphasic without dampening. These findings are consistent with mild arterial insufficiency of the right lower extremity at rest.  The left ankle/brachial index is 0.63. The waveforms are biphasic without dampening. These findings are consistent with moderate arterial insufficiency of the left lower extremity at rest.      Impression: Impression: 1. Mild arterial insufficiency of the right lower extremity at rest. 2. Moderate arterial insufficiency of the left lower extremity at rest.    This report was signed and finalized on 2/14/2024 2:37 PM by Dr. Esequiel Vaz MD.        Noninvasive testing including a carotid duplex shows 70 to 99% right carotid stenosis and a known  left carotid occlusion.  ABIs show mild arterial insufficiency to the right and moderate arterial insufficiency to the left lower extremity.    Patient Active Problem List   Diagnosis    Tobacco abuse    Essential hypertension    Other hyperlipidemia    PAD (peripheral artery disease)    Aortoiliac occlusive disease    CKD (chronic kidney disease) stage 4, GFR 15-29 ml/min    Coronary artery disease of native artery of native heart with stable angina pectoris    Ischemic cardiomyopathy    Overweight with body mass index (BMI) of 29 to 29.9 in adult    Carotid occlusion, left    Stenosis of right carotid artery         ICD-10-CM ICD-9-CM   1. Stenosis of right carotid artery  I65.21 433.10   2. Carotid occlusion, left  I65.22 433.10   3. Essential hypertension  I10 401.9   4. Other hyperlipidemia  E78.49 272.4   5. Aortoiliac occlusive disease  I74.09 444.09   6. Tobacco abuse  Z72.0 305.1               PLAN: After thoroughly evaluating Campbell Casas, I proceeded with right transcarotid artery revascularization for stroke risk reduction.  I did review his testing as well as review with Dr. Vaz and is noted to have 80 to 85% focal right carotid stenosis.  Risks of transcarotid artery revascularization include, but are not limited to, bleeding, infection, vessel damage, nerve damage, MI, stroke, and death.  The patient understands these risks and would like to proceed with the procedure.  I will need to add aspirin 81 mg daily in addition to his Plavix and Lipitor that he will continue uninterrupted prior to surgery.  He is due for follow-up with cardiology who will reach out to get him set up for cardiac restratification.  I did discuss vascular risk factors as they pertain to the progression of vascular disease including controlling his hypertension and hyperlipidemia.  His blood pressure is elevated at 160/82.  He should continue on his Plavix 75 mg daily, Lipitor 80 mg daily and Tricor 54 mg daily in addition  to his other medications.  Unfortunately he is a current daily smoker but reports he has cut back to 14 cigarettes/day.  This was all discussed in full with complete understanding.  Thank you for allowing me to participate in the care of your patient.  Please do not hesitate to call with any questions or concerns.  We will keep you aware of any further encounters with Campbell Casas.      Sincerely Yours,      CARYN Boyd

## 2024-03-22 ENCOUNTER — TELEPHONE (OUTPATIENT)
Dept: VASCULAR SURGERY | Facility: CLINIC | Age: 69
End: 2024-03-22

## 2024-03-22 ENCOUNTER — TELEPHONE (OUTPATIENT)
Dept: CARDIOLOGY | Facility: CLINIC | Age: 69
End: 2024-03-22
Payer: MEDICARE

## 2024-03-22 NOTE — TELEPHONE ENCOUNTER
Caller: Campbell Casas    Relationship to patient: Self    Best call back number: [phone]     Chief complaint: PATIENT LOST HIS PHONE AND WAS UNABLE TO RECEIVE PHONE CALLS OVER THE LAST COUPLE OF WEEKS.     Type of visit: SURGERY     Requested date: NEXT AVAILABLE      Additional notes:PATIENT WOULD LIKE A CALL BACK TO SCHEDULE SURGERY.

## 2024-03-22 NOTE — TELEPHONE ENCOUNTER
----- Message from CARYN Gilbert sent at 3/12/2024  2:37 PM CDT -----  Patient has not been seen since 8/2023. Can you reach out to him and get him scheduled to see us for cardiac risk assessment. You can use a 9:30 same day appointment if needed. Thanks  ----- Message -----  From: Reyna Lopez APRN  Sent: 3/12/2024   2:22 PM CDT  To: CARYN Gilbert; #    Will need right TCAR.  Will he need anything prior for clearance?  He will not need to hold aspirin or plavix prior to surgery.

## 2024-04-01 ENCOUNTER — OFFICE VISIT (OUTPATIENT)
Dept: CARDIOLOGY | Facility: CLINIC | Age: 69
End: 2024-04-01
Payer: MEDICARE

## 2024-04-01 VITALS
BODY MASS INDEX: 30.92 KG/M2 | OXYGEN SATURATION: 97 % | HEART RATE: 95 BPM | SYSTOLIC BLOOD PRESSURE: 144 MMHG | DIASTOLIC BLOOD PRESSURE: 82 MMHG | HEIGHT: 67 IN | WEIGHT: 197 LBS

## 2024-04-01 DIAGNOSIS — I25.118 CORONARY ARTERY DISEASE OF NATIVE ARTERY OF NATIVE HEART WITH STABLE ANGINA PECTORIS: Primary | ICD-10-CM

## 2024-04-01 DIAGNOSIS — I25.5 ISCHEMIC CARDIOMYOPATHY: ICD-10-CM

## 2024-04-01 DIAGNOSIS — I60.9 SUBARACHNOID HEMORRHAGE: ICD-10-CM

## 2024-04-01 DIAGNOSIS — E78.5 DYSLIPIDEMIA: ICD-10-CM

## 2024-04-01 DIAGNOSIS — N18.4 CKD (CHRONIC KIDNEY DISEASE) STAGE 4, GFR 15-29 ML/MIN: ICD-10-CM

## 2024-04-01 DIAGNOSIS — I73.9 PAD (PERIPHERAL ARTERY DISEASE): ICD-10-CM

## 2024-04-01 DIAGNOSIS — I10 ESSENTIAL HYPERTENSION: ICD-10-CM

## 2024-04-01 DIAGNOSIS — Z72.0 TOBACCO ABUSE: ICD-10-CM

## 2024-04-01 DIAGNOSIS — I65.21 CAROTID STENOSIS, ASYMPTOMATIC, RIGHT: ICD-10-CM

## 2024-04-01 NOTE — PROGRESS NOTES
Subjective:     Encounter Date: 04/01/2024      Patient ID: Campbell Casas is a 69 y.o. male with coronary artery disease, ischemic cardiomyopathy, peripheral artery disease, hypertension, dyslipidemia, CKD IV and tobacco abuse.     Chief Complaint: 6 month follow up     History of Present Illness    Patient presents today for management of coronary artery disease. Patient reports that he has been doing well and recovered pretty good from his extensive hospitalization at [place] and long term rehab at [facility] of last summer.   Today he is needing to have a right TCAR done. He denies any chest pain. He reports some dyspnea on moderate exertion at times. He reports that his biggest issues is dizziness when changing positions quickly. He denies any heart racing or palpitations.. He denies any falls. He denies any leg swelling, orthopnea or PND. He denies any monitoring of blood pressure at home. He reports that he has been trying to take his medications but doesn't always remember. He reports that he has rare alcohol use. He reports short term memory problems. He follows with Yaa RUBIN as PCP.     Previous Cardiac Testing and Procedures:  -Echo (8/17/2014) EF 40-45%, abnormal diastolic function, normal RV size and function, normal atria, mild TR and MR  -DSE (8/18/2014) possible old basal MI with akiko-infarct ischemia  -LHC (8/19/2014) 50% mid LAD,  of mid RCA with filling via left to right collaterals, EF 40%  -proBNP (7/7/2019) 793, normal 0-900  -DK (5/18/2022) moderate arterial insufficiency of the right lower extremity at rest, severe arterial insufficiency of the left lower extremity at rest  -CTA of the abdomen (5/31/2022) severe atheromatous changes of the abdominal aorta, partial luminal intramural thrombosis of the distal abdominal aorta with less than 50% stenosis, long segment of noncalcified atheromatous plaque along the proximal to mid right SFA with up to 70% stenosis significant  high-grade stenosis of the proximal right SFA  -BMP (6/13/2022) creatinine 2.48, GFR 28, BUN 46, potassium 4.2, sodium 138  -Lipid panel (6/13/2022): total cholesterol 162, HDL 28, LDL 93, triglycerides 240  -Echo (6/21/2022):LVEF 56-60%, moderate LVH, grade I diastolic dysfunction and no significant valvular disease.  -Nuclear stress test (6/21/2022):  low risk for ischemia  -DK (12/13/2022): Mild arterial insufficiency of bilateral lower extremities at rest  -Echo (7/16/2023):LVEF 56-60%, grade I diastolic dysfunction and no significant valvular disease.     The following portions of the patient's history were reviewed and updated as appropriate: allergies, current medications, past family history, past medical history, past social history, past surgical history and problem list.    No Known Allergies    Current Outpatient Medications:     amLODIPine (NORVASC) 10 MG tablet, Take 1 tablet by mouth every night at bedtime., Disp: 30 tablet, Rfl: 11    aspirin 81 MG EC tablet, Take 1 tablet by mouth Daily., Disp: , Rfl:     atorvastatin (LIPITOR) 80 MG tablet, Take 1 tablet by mouth every night at bedtime., Disp: 90 tablet, Rfl: 3    clopidogrel (Plavix) 75 MG tablet, Take 1 tablet by mouth Daily., Disp: , Rfl:     diazePAM (VALIUM) 10 MG tablet, Take 1 tablet by mouth 3 (Three) Times a Day As Needed., Disp: , Rfl:     fenofibrate (TRICOR) 54 MG tablet, Take 1 tablet by mouth Daily., Disp: , Rfl:     FLUoxetine (PROzac) 20 MG capsule, Take 1 capsule by mouth Daily., Disp: , Rfl:     lisinopril (PRINIVIL,ZESTRIL) 40 MG tablet, Take 1 tablet by mouth Daily., Disp: 90 tablet, Rfl: 3    metoprolol succinate XL (TOPROL-XL) 100 MG 24 hr tablet, Take 1 tablet by mouth every night at bedtime., Disp: 90 tablet, Rfl: 3    oxybutynin (DITROPAN) 5 MG tablet, Take 1 tablet by mouth 3 (Three) Times a Day., Disp: , Rfl:     QUEtiapine (SEROquel) 25 MG tablet, Take 1 tablet by mouth 2 (Two) Times a Day., Disp: , Rfl:     tamsulosin  (FLOMAX) 0.4 MG capsule 24 hr capsule, Take 1 capsule by mouth every night at bedtime., Disp: , Rfl:     Past Medical History:   Diagnosis Date    Anxiety     CHF (congestive heart failure)     Acute    Depression     Hypertension      Social History     Socioeconomic History    Marital status: Single   Tobacco Use    Smoking status: Every Day     Current packs/day: 1.00     Average packs/day: 1 pack/day for 49.2 years (49.2 ttl pk-yrs)     Types: Cigarettes     Start date: 1975     Passive exposure: Current    Smokeless tobacco: Never    Tobacco comments:     Pt states he has cut back to 14 cigarettes per day.   Vaping Use    Vaping status: Never Used   Substance and Sexual Activity    Alcohol use: Not Currently    Drug use: Never    Sexual activity: Defer       Review of Systems   Constitutional: Negative for malaise/fatigue and weight gain.   HENT:  Negative for nosebleeds.    Cardiovascular:  Positive for dyspnea on exertion (unchanged). Negative for chest pain, irregular heartbeat, leg swelling, near-syncope, orthopnea, palpitations, paroxysmal nocturnal dyspnea and syncope.   Respiratory:  Negative for shortness of breath.    Hematologic/Lymphatic: Bruises/bleeds easily.   Genitourinary:  Negative for hematuria.   Neurological:  Positive for dizziness. Negative for weakness.   All other systems reviewed and are negative.         Objective:     Vitals reviewed.   Constitutional:       General: Not in acute distress.     Appearance: Normal appearance. Well-developed. Obese.   Eyes:      Pupils: Pupils are equal, round, and reactive to light.   HENT:      Head: Normocephalic and atraumatic.      Nose: Nose normal.   Neck:      Vascular: No carotid bruit.   Pulmonary:      Effort: Pulmonary effort is normal. No respiratory distress.      Breath sounds: Normal breath sounds. No wheezing. No rales.   Cardiovascular:      Normal rate. Regular rhythm.      Murmurs: There is no murmur.   Edema:     Peripheral edema  "absent.   Abdominal:      General: There is no distension.      Palpations: Abdomen is soft.   Musculoskeletal: Normal range of motion.      Cervical back: Normal range of motion and neck supple. Skin:     General: Skin is warm.      Findings: No erythema or rash.   Neurological:      General: No focal deficit present.      Mental Status: Alert and oriented to person, place, and time.   Psychiatric:         Attention and Perception: Attention normal.         Mood and Affect: Mood normal.         Speech: Speech normal.         Behavior: Behavior normal.         Thought Content: Thought content normal.         Judgment: Judgment normal.         /82   Pulse 95   Ht 170.2 cm (67.01\")   Wt 89.4 kg (197 lb)   SpO2 97%   BMI 30.85 kg/m²       ECG 12 Lead    Date/Time: 4/1/2024 1:36 PM  Performed by: Mason Yap APRN    Authorized by: Mason Yap APRN  Comparison: compared with previous ECG from 2/28/2023  Similar to previous ECG  Rhythm: sinus rhythm  Rate: normal  BPM: 85          Lab Review:     Nuclear stress 6/21/2022:  Interpretation Summary  Myocardial perfusion imaging indicates a medium-sized infarct located in the basal to mid inferior wall with no significant ischemia noted.  Left ventricular ejection fraction is normal. There is akinesis of the basal to mid inferior wall. (Calculated EF = 60%).  Impressions are consistent with a low risk study.      Echo 7/16/2023:  Normal biventricular size and function, LVEF 65%.   Mild biatrial dilatation with moderate biventricular hypertrophy.   No significant valvular disease.   Estimated RA pressure 3 mmHg.   There is no evidence of significant right to left shunting by agitated   saline contrast study.           DK 12/13/2022:  IMPRESSION:Impression:  1. Mild arterial insufficiency of the right lower extremity at rest.  2. Mild arterial insufficiency of the left lower extremity at rest.     I have personally reviewed labs, echo, ABIs, nuclear stress " test and past office notes prior to patients visit  Assessment:          Diagnosis Plan   1. Coronary artery disease of native artery of native heart with stable angina pectoris        2. Ischemic cardiomyopathy        3. Essential hypertension        4. Dyslipidemia        5. PAD (peripheral artery disease)        6. Carotid stenosis, asymptomatic, right        7. CKD (chronic kidney disease) stage 4, GFR 15-29 ml/min        8. Subarachnoid hemorrhage        9. Tobacco abuse        10. BMI 30.0-30.9,adult                   Plan:       1. CAD: LHC from 8/2014 demonstrated 50% stenosis of mid LAD, and a  of RCA. Nuclear stress test 6/21/2022 revealed medium-sized infarct located in the basal to mid inferior wall with no significant ischemia noted, low risk study. Patient denies any chest pain. Continue aspirin, plavix, atorvastatin and metoprolol    2. Ischemic cardiomyopathy: EF was 40-45% on echo from 8/17 2014; Echo 7/16/2023 showed LVEF 65%.  Continue metoprolol and lisinopril.     3. Hypertension: slightly elevated in office today. Patient hasn't been checking his BP at home. Recommended to monitor routinely and notify office or PCP if consistently  >140/90.     4. Hyperlipidemia: managed and followed by PCP. Lipid panel 10/10/2023 demonstrated elevated triglycerides, low HDL and LDL of 66 (goal <70). Continue atorvastatin and fenofibrate.    5. Peripheral artery disease: Severe disease noted on DK and CTA from 5/2022. S/p left common femoral endarterectomy with bilateral iliac stenting on 7/13/2022. DK 12/13/2022 showed mild arterial insuffiencey bilaterally. Continue to follow with vascular, Dr Vaz    6. Carotid stenosis: Follows with Dr Vaz. CTA neck 3/12/2024 showed 50% stenosis of right ICA with focal stenosis of 65% and complete occlusion of left ICA. Planning for TCAR procdure    7. CKD stage IV: patient is following with nephrology.     8. Subarachnoid hemorrhage: 7/2023 admission to  [place], with inpatient rehab at [facility]. Patient will follow with Dr Ocampo, neurology.     9. Tobacco abuse: Campbell Casas  reports that he has been smoking cigarettes. He started smoking about 49 years ago. He has a 49.2 pack-year smoking history. He has been exposed to tobacco smoke. He has never used smokeless tobacco.. I have educated him on the risk of diseases from using tobacco products such as cancer, COPD and heart disease. Patient has been working on decreasing cigarettes. I spent 3  minutes counseling the patient.    9. Obesity: Body mass index is 30.85 kg/m².    10. Preoperative cardiovascular examination: Overall patient is a low to intermediate surgical risk from a cardiac standpoint.  He had a previous low risk nuclear stress test in 6/2022.  He is a good functional capacity greater than 4 METS and has no cardiac symptoms.  No further cardiac testing is indicated prior to surgery.    I attest that all portions of this note reviewed and all information has been updated by myself to reflect the patient's current status.      I spent 35 minutes caring for Campbell on this date of service. This time includes time spent by me in the following activities:preparing for the visit, reviewing tests, obtaining and/or reviewing a separately obtained history, performing a medically appropriate examination and/or evaluation , counseling and educating the patient/family/caregiver, and documenting information in the medical record    I spent 2 minutes on the separately reported service of EKG. This time is not included in the time used to support the E/M service also reported today.    Patient is to return in 6 months or sooner if needed

## 2024-04-05 ENCOUNTER — PREP FOR SURGERY (OUTPATIENT)
Dept: OTHER | Facility: HOSPITAL | Age: 69
End: 2024-04-05
Payer: MEDICARE

## 2024-04-05 DIAGNOSIS — I65.21 CAROTID STENOSIS, ASYMPTOMATIC, RIGHT: Primary | ICD-10-CM

## 2024-04-05 DIAGNOSIS — Z01.818 PREOPERATIVE TESTING: ICD-10-CM

## 2024-04-05 DIAGNOSIS — Z79.02 ENCOUNTER FOR MONITORING ANTIPLATELET THERAPY: ICD-10-CM

## 2024-04-05 DIAGNOSIS — Z51.81 ENCOUNTER FOR MONITORING ANTIPLATELET THERAPY: ICD-10-CM

## 2024-04-08 ENCOUNTER — TELEPHONE (OUTPATIENT)
Dept: VASCULAR SURGERY | Facility: CLINIC | Age: 69
End: 2024-04-08
Payer: MEDICARE

## 2024-04-08 PROBLEM — I65.21 CAROTID STENOSIS, ASYMPTOMATIC, RIGHT: Status: ACTIVE | Noted: 2024-04-05

## 2024-04-08 PROBLEM — Z01.818 PREOPERATIVE TESTING: Status: ACTIVE | Noted: 2024-04-05

## 2024-04-08 NOTE — TELEPHONE ENCOUNTER
Pt expressed understanding of prework/surgery time and instruction for procedure scheduled 04/24/24 with Dr. Vaz.  NPO after midnight.  Pt to continue aspirin, plavix, and lipitor uninterrupted prior to procedure but will not take morning of procedure.

## 2024-04-17 ENCOUNTER — PRE-ADMISSION TESTING (OUTPATIENT)
Dept: PREADMISSION TESTING | Facility: HOSPITAL | Age: 69
End: 2024-04-17
Payer: MEDICARE

## 2024-04-17 ENCOUNTER — HOSPITAL ENCOUNTER (OUTPATIENT)
Dept: GENERAL RADIOLOGY | Facility: HOSPITAL | Age: 69
Discharge: HOME | End: 2024-04-17
Payer: MEDICARE

## 2024-04-17 VITALS
OXYGEN SATURATION: 97 % | BODY MASS INDEX: 31.31 KG/M2 | SYSTOLIC BLOOD PRESSURE: 175 MMHG | HEART RATE: 97 BPM | HEIGHT: 67 IN | WEIGHT: 199.52 LBS | RESPIRATION RATE: 20 BRPM | DIASTOLIC BLOOD PRESSURE: 91 MMHG

## 2024-04-17 DIAGNOSIS — Z01.818 PREOPERATIVE TESTING: ICD-10-CM

## 2024-04-17 DIAGNOSIS — Z51.81 ENCOUNTER FOR MONITORING ANTIPLATELET THERAPY: ICD-10-CM

## 2024-04-17 DIAGNOSIS — Z79.02 ENCOUNTER FOR MONITORING ANTIPLATELET THERAPY: ICD-10-CM

## 2024-04-17 DIAGNOSIS — I65.21 CAROTID STENOSIS, ASYMPTOMATIC, RIGHT: ICD-10-CM

## 2024-04-17 LAB
ANION GAP SERPL CALCULATED.3IONS-SCNC: 6 MMOL/L (ref 5–15)
APTT PPP: 26.8 SECONDS (ref 24.5–36)
BASOPHILS # BLD AUTO: 0.07 10*3/MM3 (ref 0–0.2)
BASOPHILS NFR BLD AUTO: 1 % (ref 0–1.5)
BUN SERPL-MCNC: 15 MG/DL (ref 8–23)
BUN/CREAT SERPL: 11.5 (ref 7–25)
CALCIUM SPEC-SCNC: 9.4 MG/DL (ref 8.6–10.5)
CHLORIDE SERPL-SCNC: 103 MMOL/L (ref 98–107)
CO2 SERPL-SCNC: 30 MMOL/L (ref 22–29)
CREAT SERPL-MCNC: 1.3 MG/DL (ref 0.76–1.27)
DEPRECATED RDW RBC AUTO: 39.2 FL (ref 37–54)
EGFRCR SERPLBLD CKD-EPI 2021: 60.2 ML/MIN/1.73
EOSINOPHIL # BLD AUTO: 0.31 10*3/MM3 (ref 0–0.4)
EOSINOPHIL NFR BLD AUTO: 4.6 % (ref 0.3–6.2)
ERYTHROCYTE [DISTWIDTH] IN BLOOD BY AUTOMATED COUNT: 12.6 % (ref 12.3–15.4)
GLUCOSE SERPL-MCNC: 202 MG/DL (ref 65–99)
HCT VFR BLD AUTO: 45.4 % (ref 37.5–51)
HGB BLD-MCNC: 14.9 G/DL (ref 13–17.7)
IMM GRANULOCYTES # BLD AUTO: 0.05 10*3/MM3 (ref 0–0.05)
IMM GRANULOCYTES NFR BLD AUTO: 0.7 % (ref 0–0.5)
INR PPP: 0.86 (ref 0.91–1.09)
LYMPHOCYTES # BLD AUTO: 1.81 10*3/MM3 (ref 0.7–3.1)
LYMPHOCYTES NFR BLD AUTO: 26.9 % (ref 19.6–45.3)
MCH RBC QN AUTO: 28.1 PG (ref 26.6–33)
MCHC RBC AUTO-ENTMCNC: 32.8 G/DL (ref 31.5–35.7)
MCV RBC AUTO: 85.7 FL (ref 79–97)
MONOCYTES # BLD AUTO: 0.65 10*3/MM3 (ref 0.1–0.9)
MONOCYTES NFR BLD AUTO: 9.7 % (ref 5–12)
NEUTROPHILS NFR BLD AUTO: 3.83 10*3/MM3 (ref 1.7–7)
NEUTROPHILS NFR BLD AUTO: 57.1 % (ref 42.7–76)
NRBC BLD AUTO-RTO: 0 /100 WBC (ref 0–0.2)
PLATELET # BLD AUTO: 215 10*3/MM3 (ref 140–450)
PMV BLD AUTO: 10.3 FL (ref 6–12)
POTASSIUM SERPL-SCNC: 4.7 MMOL/L (ref 3.5–5.2)
PROTHROMBIN TIME: 12 SECONDS (ref 11.8–14.8)
RBC # BLD AUTO: 5.3 10*6/MM3 (ref 4.14–5.8)
SODIUM SERPL-SCNC: 139 MMOL/L (ref 136–145)
WBC NRBC COR # BLD AUTO: 6.72 10*3/MM3 (ref 3.4–10.8)

## 2024-04-17 PROCEDURE — 85025 COMPLETE CBC W/AUTO DIFF WBC: CPT

## 2024-04-17 PROCEDURE — 85610 PROTHROMBIN TIME: CPT

## 2024-04-17 PROCEDURE — 36415 COLL VENOUS BLD VENIPUNCTURE: CPT

## 2024-04-17 PROCEDURE — 71046 X-RAY EXAM CHEST 2 VIEWS: CPT

## 2024-04-17 PROCEDURE — 80048 BASIC METABOLIC PNL TOTAL CA: CPT

## 2024-04-17 PROCEDURE — 85730 THROMBOPLASTIN TIME PARTIAL: CPT

## 2024-04-17 RX ORDER — TRAZODONE HYDROCHLORIDE 150 MG/1
150 TABLET ORAL NIGHTLY
COMMUNITY

## 2024-04-17 NOTE — DISCHARGE INSTRUCTIONS
Preparing for Surgery  Follow these instructions before the procedure:  Several days or weeks before your procedure  Medication(s) you need to stop   _______ days/week prior to surgery:  As directed by Dr. Vaz      Ask your health care provider about:  Changing or stopping your regular medicines. This is especially important if you are taking diabetes medicines or blood thinners.  Taking medicines such as aspirin and ibuprofen. These medicines can thin your blood. Do not take these medicines unless your health care provider tells you to take them.  Taking over-the-counter medicines, vitamins, herbs, and supplements.    Contact your surgeon if you:  Develop a fever of more than 100.4°F (38°C) or other feelings of illness during the 48 hours before your surgery.  Have symptoms that get worse.  Have questions or concerns about your surgery.  If you are going home the same day of your surgery you will need to arrange for a responsible adult, age 18 years old or older, to drive you home from the hospital and stay with you for 24 hours. Verification of the  will be made prior to any procedure requiring sedation. You may not go home in a taxi or any form of public transportation by yourself.     Day before your procedure  Medication(s) you need to stop the day before your surgery: Lisinopril (ZESTRIL)    24 hours before your procedure DO NOT drink alcoholic beverages or smoke.  24 hours before your procedure STOP taking Erectile Dysfunction medication (i.e.,Cialis, Viagra)   You may be asked to shower with a germ-killing soap.  Day of your procedure   You may take the following medication(s) the morning of surgery with a sip of water: Metoprolol Succinate (TOPROL XL) and Diazepam (VALIUM) if needed      8 hours before your procedure STOP all food, any dairy products, and full liquids. This includes hard candy, chewing gum or mints. This is extremely important to prevent serious complications.   Up to 2 hours  before your scheduled arrival time, you may have clear liquids no cream, powder, or pulp of any kind. Safe options are water, black coffee, plain tea, soda, Gatorade/Powerade, clear broth, apple juice.  2 hours before your scheduled arrival time, STOP drinking clear liquids.  You may need to take another shower with a germ-killing soap before you leave home in the morning. Do not use perfumes, colognes, or body lotions.  Wear comfortable loose-fitting clothing.  Remove all jewelry including body piercing and rings, dark colored nail polish, and make up prior to arrival at the hospital. Leave all valuables at home.   Bring your hearing aids if you rely on them.  Do not wear contact lenses. If you wear eyeglasses remember to bring a case to store them in while you are in surgery.  Do not use denture adhesives since you will be asked to remove them during your surgery.    You do not need to bring your home medications into the hospital.   Bring your sleep apnea device with you on the day of your surgery (if this applies to you).  If you wear portable oxygen, bring it with you.   If you are staying overnight, you may bring a bag of items you may need such as slippers, robe and a change of clothes for your discharge. You may want to leave these items in the car until you are ready for them since your family will take your belongings when you leave the pre-operative area.  Arrive at the hospital as scheduled by the office. You will be asked to arrive 2 hours prior to your surgery time in order to prepare for your procedure.  When you arrive at the hospital  Go to the registration desk located at the main entrance of the hospital.  After registration is completed, you will be given a beeper and a sticker sheet. Take the stickers to Outpatient Surgery and place in the tray at the end of the desk to notify the staff that you have arrived and registered.   Return to the lobby to wait. You are not always called back according  to the time of arrival but rather the time your doctor will be ready.  When your beeper lights up and vibrates proceed through the double doors, under the stairs, and a member of the Outpatient Surgery staff will escort you to your preoperative room.

## 2024-04-21 NOTE — H&P
4/21/2024       Yaa Fox APRN  [address] [address]           Campbell Casas  1955          Chief Complaint   Patient presents with    Follow-up       2 week follow up w/ testing. Last seen 2/14/24. Patient states that dizziness is getting worse, can barely walk at times.         Dear Yaa Fox APRN:     HPI      I had the pleasure of seeing you patient in the office today for follow up.  As you recall, the patient is a 69 y.o. male who we are currently following for routine health maintenance.  He was having complaints of pain to his legs for the past couple of years.  He really describes bilateral hip and buttock pain and reports they lock up on him.  He did undergo an aortoiliac angiogram with intravascular lithotripsy of the right common femoral and proximal small superficial femoral arteries with lithotripsy of the right and left external iliac arteries.  He also underwent a left common femoral endarterectomy on 7/13/2022.  He has having more dizzy spells than he was having before.  He reports he can barely walk at times.  Unfortunately continues to smoke but reports he is down to 14 cigarettes/day.  He did have noninvasive testing performed today, which I did review in office.    Past Medical History:   Diagnosis Date    Anxiety     CHF (congestive heart failure)     Acute    Depression     Elevated cholesterol     Hypertension      Past Surgical History:   Procedure Laterality Date    AORTAGRAM N/A 07/13/2022    Procedure: LEFT COMMON FEMORAL ENDARTERECTOMY WITH BILATERAL ILIAC STENTING;  Surgeon: Esequiel Vaz DO;  Location:  [facility];  Service: Vascular;  Laterality: N/A;    CARDIAC CATHETERIZATION      FEMORAL ENDARTERECTOMY Left 07/13/2022    Procedure: LEFT COMMON FEMORAL ENDARTERECTOMY WITH BILATERAL ILIAC STENTING;  Surgeon: Esequiel Vaz DO;  Location:  [facility];  Service: Vascular;  Laterality: Left;    KNEE ARTHROSCOPY Left      Social  History     Socioeconomic History    Marital status: Single   Tobacco Use    Smoking status: Every Day     Current packs/day: 1.00     Average packs/day: 1 pack/day for 49.3 years (49.3 ttl pk-yrs)     Types: Cigarettes     Start date: 1975     Passive exposure: Current    Smokeless tobacco: Never    Tobacco comments:     Pt states he has cut back to 14 cigarettes per day.   Vaping Use    Vaping status: Never Used   Substance and Sexual Activity    Alcohol use: Yes     Comment: occasionally    Drug use: Never    Sexual activity: Defer     Family History   Problem Relation Age of Onset    Heart attack Father      Current Outpatient Medications   Medication Instructions    amLODIPine (NORVASC) 10 mg, Oral, Every Night at Bedtime    aspirin 81 mg, Oral, Daily    atorvastatin (LIPITOR) 80 mg, Oral, Every Night at Bedtime    clopidogrel (Plavix) 75 MG tablet 1 tablet, Oral, Daily    diazePAM (VALIUM) 10 mg, Oral, 3 Times Daily PRN    fenofibrate (TRICOR) 54 mg, Oral, Daily    FLUoxetine (PROZAC) 20 mg, Oral, Daily    lisinopril (PRINIVIL,ZESTRIL) 40 mg, Oral, Daily    metoprolol succinate XL (TOPROL-XL) 100 mg, Oral, Every Night at Bedtime    oxybutynin (DITROPAN) 10 mg, Oral, Nightly    QUEtiapine (SEROquel) 25 MG tablet 1 tablet, Oral, 2 Times Daily    tamsulosin (FLOMAX) 0.4 MG capsule 24 hr capsule 1 capsule, Oral, Every Night at Bedtime    traZODone (DESYREL) 150 mg, Oral, Nightly     No Known Allergies     Review of Systems   Constitutional: Negative.    HENT: Negative.     Eyes: Negative.    Respiratory: Negative.     Cardiovascular: Negative.         Efra hip/buttock pain   Gastrointestinal: Negative.    Endocrine: Negative.    Genitourinary: Negative.    Musculoskeletal: Negative.    Skin: Negative.    Allergic/Immunologic: Negative.    Neurological:  Positive for dizziness, syncope and light-headedness.   Hematological: Negative.    Psychiatric/Behavioral: Negative.     All other systems reviewed and are  "negative.        /78   Pulse 88   Ht 170.2 cm (67\")   Wt 87.1 kg (192 lb)   SpO2 94%   BMI 30.07 kg/m²   Physical Exam  Vitals and nursing note reviewed.   Constitutional:       Appearance: Normal appearance. He is well-developed and overweight.   HENT:      Head: Normocephalic and atraumatic.   Eyes:      General: No scleral icterus.     Pupils: Pupils are equal, round, and reactive to light.   Neck:      Thyroid: No thyromegaly.      Vascular: No carotid bruit or JVD.   Cardiovascular:      Rate and Rhythm: Normal rate and regular rhythm.      Pulses:           Carotid pulses are 2+ on the right side and 2+ on the left side.       Femoral pulses are 2+ on the right side and 2+ on the left side.       Popliteal pulses are 2+ on the right side and 2+ on the left side.        Dorsalis pedis pulses are detected w/ Doppler on the right side and detected w/ Doppler on the left side.        Posterior tibial pulses are detected w/ Doppler on the right side and detected w/ Doppler on the left side.      Heart sounds: Normal heart sounds.   Pulmonary:      Effort: Pulmonary effort is normal.      Breath sounds: Normal breath sounds.   Abdominal:      General: Bowel sounds are normal. There is no distension or abdominal bruit.      Palpations: Abdomen is soft. There is no mass.      Tenderness: There is no abdominal tenderness.   Musculoskeletal:         General: Normal range of motion.      Cervical back: Neck supple.   Lymphadenopathy:      Cervical: No cervical adenopathy.   Skin:     General: Skin is warm and dry.   Neurological:      General: No focal deficit present.      Mental Status: He is alert and oriented to person, place, and time.      Cranial Nerves: No cranial nerve deficit.      Sensory: No sensory deficit.   Psychiatric:         Mood and Affect: Mood normal.         Behavior: Behavior normal. Behavior is cooperative.         Thought Content: Thought content normal.         Judgment: Judgment " normal.         DIAGNOSTIC DATA:       CT Angiogram Neck     Result Date: 3/12/2024  Narrative: EXAMINATION: CT ANGIOGRAM NECK- 3/12/2024 1:58 PM  HISTORY: Carotid artery stenosis; I65.21-Occlusion and stenosis of right carotid artery. Known occlusion of the left internal carotid artery.  TOTAL DOSE: 312.55 mGy.cm (Automatic exposure control technique was implemented in an effort to keep the radiation dose as low as possible without compromising image quality)  REPORT: Spiral CT of the neck was performed after administration of intravenous contrast using CTA protocol, which includes reconstructed coronal, sagittal and 3D images.  COMPARISON: Ultrasound of the carotid arteries 2/14/2024.  Grading of carotid stenosis performed with NASCET criteria.  Review of the lung windows demonstrates normal aeration of the visualized upper lungs. The visualized thoracic aortic arch is normal in caliber, there is small volume of calcified plaque within the proximal arch. No dissection is identified. Motion artifact limits evaluation of the origins of the great vessels. However, there is noncalcified plaque and/or thrombus at the origin of the innominate artery, which contributes to approximately 50 to 60% stenosis of the vessel. This is seen on image 105 of series 5. There is focal stenosis at the origin of the right common carotid artery over a short segment, with approximately 65% narrowing this is seen on image 98 series 5. And image 32 series 7. There is moderate calcified and noncalcified plaque at the right carotid bulb and origin of the right ICA, the smallest patent diameter of the proximal right ICA measures 2.4 mm image 52 series 5, this corresponds with 50% stenosis. No right carotid dissection is identified. The right external carotid artery is patent. There is a small volume of calcified plaque within the distal ICA at the cavernous segment.  On the left, the common carotid artery appears patent, normal in caliber.  The left internal carotid artery is occluded approximately 1 cm distal to its origin and the vessel is briefly reconstituted at its cavernous segment but there is extensive atherosclerosis of the cavernous segment with high-grade narrowing. The terminus of the left ICA is occluded. There is a 4 x 5 mm focus of apparent calcified plaque at the terminus of the vessel. A portion of the [place] is included in the field-of-view, the right middle cerebral artery appears irregular and very small in caliber. Dedicated CTA of the head is recommended for full evaluation.  Both vertebral arteries are patent, the left vertebral artery is dominant compared to the right. No vertebral dissection is identified. Bilateral PICA appear patent. The basilar artery is patent but not fully imaged to its tip. Soft tissue windows demonstrate normal appearance of the thyroid gland. The airway is patent. No cervical lymphadenopathy is identified. Review of bone windows shows no acute osseous abnormality.       Impression: 1. Occlusion of the left internal carotid artery beginning approximately 1 cm distal to its origin, with a small reconstituted segment at the cavernous sinus level, compatible with known left carotid artery occlusion. 2. Attenuation of the left middle cerebral artery beginning at its origin and more distally, there is limited visualization of the left MCA with the field-of-view obtained. Consider follow-up dedicated CTA of the head for full evaluation. 3. 50% stenosis of the right internal carotid artery, associated with calcified plaque, which extends over segment measuring approximately 18 mm in length. 4. Noncalcified plaque and/or chronic thrombus at the origin of the innominate artery contributing to approximately 50 to 60% stenosis. 5. Focal stenosis at the origin of the right common carotid artery over a short segment, estimated at 65%. 6. Patency of the vertebral arteries without flow-limiting stenosis, the  left vertebral artery is dominant compared to the right.  This report was signed and finalized on 3/12/2024 2:16 PM by Dr. Luis F Ramirez MD.       US Carotid Bilateral     Result Date: 2/14/2024  Narrative: History: Carotid occlusive disease       Impression: Impression: 1. There is 50 to 69% stenosis of the right internal carotid artery. 2. There is known occlusion of the left internal carotid artery. 3. Antegrade flow is demonstrated in bilateral vertebral arteries.  Comments: Bilateral carotid vertebral arterial duplex scan was performed.  Grayscale imaging shows intimal thickening and calcified elements at the carotid bifurcation. The right internal carotid artery peak systolic velocity is 323.5 cm/sec. The end-diastolic velocity is 88 cm/sec. The right ICA/CCA ratio is approximately 3.6. These findings correlate with 50 to 69% stenosis of the right internal carotid artery.  There is a known occlusion of the left internal carotid artery.  Antegrade flow is demonstrated in bilateral vertebral arteries. There is greater than 50% stenosis in bilateral external carotid arteries.  This report was signed and finalized on 2/14/2024 2:54 PM by Dr. Esequiel Vaz MD.       US Ankle / Brachial Indices Extremity Complete     Result Date: 2/14/2024  Narrative:  History: [facility]  Comments: Bilateral lower extremity arterial with multi-level pulse volume recordings and segmental pressures were performed at rest and stress.  The right ankle/brachial index is 0.86. The waveforms are biphasic without dampening. These findings are consistent with mild arterial insufficiency of the right lower extremity at rest.  The left ankle/brachial index is 0.63. The waveforms are biphasic without dampening. These findings are consistent with moderate arterial insufficiency of the left lower extremity at rest.       Impression: Impression: 1. Mild arterial insufficiency of the right lower extremity at rest. 2. Moderate arterial insufficiency  of the left lower extremity at rest.    This report was signed and finalized on 2/14/2024 2:37 PM by Dr. Esequiel Vaz MD.         Noninvasive testing including a carotid duplex shows 70 to 99% right carotid stenosis and a known left carotid occlusion.  ABIs show mild arterial insufficiency to the right and moderate arterial insufficiency to the left lower extremity.     Problem List       Patient Active Problem List   Diagnosis    Tobacco abuse    Essential hypertension    Other hyperlipidemia    PAD (peripheral artery disease)    Aortoiliac occlusive disease    CKD (chronic kidney disease) stage 4, GFR 15-29 ml/min    Coronary artery disease of native artery of native heart with stable angina pectoris    Ischemic cardiomyopathy    Overweight with body mass index (BMI) of 29 to 29.9 in adult    Carotid occlusion, left    Stenosis of right carotid artery            Visit Diagnosis       ICD-10-CM ICD-9-CM   1. Stenosis of right carotid artery  I65.21 433.10   2. Carotid occlusion, left  I65.22 433.10   3. Essential hypertension  I10 401.9   4. Other hyperlipidemia  E78.49 272.4   5. Aortoiliac occlusive disease  I74.09 444.09   6. Tobacco abuse  Z72.0 305.1                        PLAN: After thoroughly evaluating Campbell Casas, I proceeded with right transcarotid artery revascularization for stroke risk reduction.  I did review his testing as well as review with Dr. Vaz and is noted to have 80 to 85% focal right carotid stenosis.  Risks of transcarotid artery revascularization include, but are not limited to, bleeding, infection, vessel damage, nerve damage, MI, stroke, and death.  The patient understands these risks and would like to proceed with the procedure.  I will need to add aspirin 81 mg daily in addition to his Plavix and Lipitor that he will continue uninterrupted prior to surgery.  He is due for follow-up with cardiology who will reach out to get him set up for cardiac restratification.  I did  discuss vascular risk factors as they pertain to the progression of vascular disease including controlling his hypertension and hyperlipidemia.  His blood pressure is elevated at 160/82.  He should continue on his Plavix 75 mg daily, Lipitor 80 mg daily and Tricor 54 mg daily in addition to his other medications.  Unfortunately he is a current daily smoker but reports he has cut back to 14 cigarettes/day.  This was all discussed in full with complete understanding.  Thank you for allowing me to participate in the care of your patient.  Please do not hesitate to call with any questions or concerns.  We will keep you aware of any further encounters with Campbell Casas.        Sincerely Yours,        Esequiel Vaz, DO

## 2024-04-23 ENCOUNTER — TELEPHONE (OUTPATIENT)
Dept: VASCULAR SURGERY | Facility: CLINIC | Age: 69
End: 2024-04-23
Payer: MEDICARE

## 2024-04-24 ENCOUNTER — ANESTHESIA (OUTPATIENT)
Dept: PERIOP | Facility: HOSPITAL | Age: 69
End: 2024-04-24
Payer: MEDICARE

## 2024-04-24 ENCOUNTER — HOSPITAL ENCOUNTER (INPATIENT)
Facility: HOSPITAL | Age: 69
LOS: 1 days | Discharge: HOME | End: 2024-04-25
Attending: SURGERY | Admitting: SURGERY
Payer: MEDICARE

## 2024-04-24 ENCOUNTER — APPOINTMENT (OUTPATIENT)
Dept: INTERVENTIONAL RADIOLOGY/VASCULAR | Facility: HOSPITAL | Age: 69
End: 2024-04-24
Payer: MEDICARE

## 2024-04-24 ENCOUNTER — ANESTHESIA EVENT (OUTPATIENT)
Dept: PERIOP | Facility: HOSPITAL | Age: 69
End: 2024-04-24
Payer: MEDICARE

## 2024-04-24 DIAGNOSIS — G89.18 POST-OP PAIN: Primary | ICD-10-CM

## 2024-04-24 DIAGNOSIS — I65.21 CAROTID STENOSIS, ASYMPTOMATIC, RIGHT: ICD-10-CM

## 2024-04-24 DIAGNOSIS — Z01.818 PREOPERATIVE TESTING: ICD-10-CM

## 2024-04-24 LAB
ABO GROUP BLD: NORMAL
BLD GP AB SCN SERPL QL: NEGATIVE
RH BLD: POSITIVE
T&S EXPIRATION DATE: NORMAL

## 2024-04-24 PROCEDURE — P9041 ALBUMIN (HUMAN),5%, 50ML: HCPCS

## 2024-04-24 PROCEDURE — 25010000002 HEPARIN (PORCINE) PER 1000 UNITS: Performed by: SURGERY

## 2024-04-24 PROCEDURE — 25010000002 BUPIVACAINE 0.5 % SOLUTION: Performed by: SURGERY

## 2024-04-24 PROCEDURE — 76000 FLUOROSCOPY <1 HR PHYS/QHP: CPT

## 2024-04-24 PROCEDURE — C1769 GUIDE WIRE: HCPCS | Performed by: SURGERY

## 2024-04-24 PROCEDURE — 25010000002 HEPARIN (PORCINE) PER 1000 UNITS: Performed by: NURSE ANESTHETIST, CERTIFIED REGISTERED

## 2024-04-24 PROCEDURE — 25010000002 CEFAZOLIN PER 500 MG: Performed by: SURGERY

## 2024-04-24 PROCEDURE — 86850 RBC ANTIBODY SCREEN: CPT | Performed by: NURSE PRACTITIONER

## 2024-04-24 PROCEDURE — C1884 EMBOLIZATION PROTECT SYST: HCPCS | Performed by: SURGERY

## 2024-04-24 PROCEDURE — 25010000002 PROTAMINE SULFATE PER 10 MG: Performed by: NURSE ANESTHETIST, CERTIFIED REGISTERED

## 2024-04-24 PROCEDURE — 037K3DZ DILATION OF RIGHT INTERNAL CAROTID ARTERY WITH INTRALUMINAL DEVICE, PERCUTANEOUS APPROACH: ICD-10-PCS | Performed by: SURGERY

## 2024-04-24 PROCEDURE — 85347 COAGULATION TIME ACTIVATED: CPT

## 2024-04-24 PROCEDURE — 25810000003 LACTATED RINGERS PER 1000 ML: Performed by: SURGERY

## 2024-04-24 PROCEDURE — 25010000002 SUGAMMADEX 200 MG/2ML SOLUTION: Performed by: NURSE ANESTHETIST, CERTIFIED REGISTERED

## 2024-04-24 PROCEDURE — 37215 TRANSCATH STENT CCA W/EPS: CPT | Performed by: SURGERY

## 2024-04-24 PROCEDURE — 25010000002 PROPOFOL 10 MG/ML EMULSION: Performed by: NURSE ANESTHETIST, CERTIFIED REGISTERED

## 2024-04-24 PROCEDURE — C1894 INTRO/SHEATH, NON-LASER: HCPCS | Performed by: SURGERY

## 2024-04-24 PROCEDURE — 25010000002 PHENYLEPHRINE 10 MG/ML SOLUTION 5 ML VIAL: Performed by: NURSE ANESTHETIST, CERTIFIED REGISTERED

## 2024-04-24 PROCEDURE — 86900 BLOOD TYPING SEROLOGIC ABO: CPT | Performed by: NURSE PRACTITIONER

## 2024-04-24 PROCEDURE — X2AH336 CEREBRAL EMBOLIC FILTRATION, EXTRACORPOREAL FLOW REVERSAL CIRCUIT FROM RIGHT COMMON CAROTID ARTERY, PERCUTANEOUS APPROACH, NEW TECHNOLOGY GROUP 6: ICD-10-PCS | Performed by: SURGERY

## 2024-04-24 PROCEDURE — C1725 CATH, TRANSLUMIN NON-LASER: HCPCS | Performed by: SURGERY

## 2024-04-24 PROCEDURE — 25010000002 ALBUMIN HUMAN 5% PER 50 ML

## 2024-04-24 PROCEDURE — 25010000002 CEFAZOLIN PER 500 MG: Performed by: NURSE PRACTITIONER

## 2024-04-24 PROCEDURE — 25810000003 SODIUM CHLORIDE 0.9 % SOLUTION 250 ML FLEX CONT: Performed by: NURSE ANESTHETIST, CERTIFIED REGISTERED

## 2024-04-24 PROCEDURE — 25010000002 FENTANYL CITRATE (PF) 100 MCG/2ML SOLUTION: Performed by: NURSE ANESTHETIST, CERTIFIED REGISTERED

## 2024-04-24 PROCEDURE — 0 IOVERSOL 74 % SOLUTION: Performed by: SURGERY

## 2024-04-24 PROCEDURE — 86901 BLOOD TYPING SEROLOGIC RH(D): CPT | Performed by: NURSE PRACTITIONER

## 2024-04-24 PROCEDURE — 25010000002 ONDANSETRON PER 1 MG: Performed by: NURSE ANESTHETIST, CERTIFIED REGISTERED

## 2024-04-24 RX ORDER — ONDANSETRON 2 MG/ML
INJECTION INTRAMUSCULAR; INTRAVENOUS AS NEEDED
Status: DISCONTINUED | OUTPATIENT
Start: 2024-04-24 | End: 2024-04-24 | Stop reason: SURG

## 2024-04-24 RX ORDER — METOPROLOL SUCCINATE 100 MG/1
100 TABLET, EXTENDED RELEASE ORAL
Status: DISCONTINUED | OUTPATIENT
Start: 2024-04-24 | End: 2024-04-25 | Stop reason: HOSPADM

## 2024-04-24 RX ORDER — ASPIRIN 81 MG/1
81 TABLET ORAL DAILY
Status: DISCONTINUED | OUTPATIENT
Start: 2024-04-25 | End: 2024-04-25 | Stop reason: HOSPADM

## 2024-04-24 RX ORDER — FLUMAZENIL 0.1 MG/ML
0.2 INJECTION INTRAVENOUS AS NEEDED
Status: DISCONTINUED | OUTPATIENT
Start: 2024-04-24 | End: 2024-04-24 | Stop reason: HOSPADM

## 2024-04-24 RX ORDER — SODIUM CHLORIDE 0.9 % (FLUSH) 0.9 %
3-10 SYRINGE (ML) INJECTION AS NEEDED
Status: DISCONTINUED | OUTPATIENT
Start: 2024-04-24 | End: 2024-04-24 | Stop reason: HOSPADM

## 2024-04-24 RX ORDER — LABETALOL HYDROCHLORIDE 5 MG/ML
10 INJECTION, SOLUTION INTRAVENOUS
Status: DISCONTINUED | OUTPATIENT
Start: 2024-04-24 | End: 2024-04-25 | Stop reason: HOSPADM

## 2024-04-24 RX ORDER — SODIUM CHLORIDE 0.9 % (FLUSH) 0.9 %
10 SYRINGE (ML) INJECTION AS NEEDED
Status: DISCONTINUED | OUTPATIENT
Start: 2024-04-24 | End: 2024-04-25 | Stop reason: HOSPADM

## 2024-04-24 RX ORDER — PROTAMINE SULFATE 10 MG/ML
INJECTION, SOLUTION INTRAVENOUS AS NEEDED
Status: DISCONTINUED | OUTPATIENT
Start: 2024-04-24 | End: 2024-04-24 | Stop reason: SURG

## 2024-04-24 RX ORDER — LISINOPRIL 20 MG/1
40 TABLET ORAL 2 TIMES DAILY
Status: DISCONTINUED | OUTPATIENT
Start: 2024-04-24 | End: 2024-04-25 | Stop reason: HOSPADM

## 2024-04-24 RX ORDER — SODIUM CHLORIDE 0.9 % (FLUSH) 0.9 %
3 SYRINGE (ML) INJECTION EVERY 12 HOURS SCHEDULED
Status: DISCONTINUED | OUTPATIENT
Start: 2024-04-24 | End: 2024-04-24 | Stop reason: HOSPADM

## 2024-04-24 RX ORDER — BUPIVACAINE HYDROCHLORIDE 5 MG/ML
INJECTION, SOLUTION PERINEURAL AS NEEDED
Status: DISCONTINUED | OUTPATIENT
Start: 2024-04-24 | End: 2024-04-24 | Stop reason: HOSPADM

## 2024-04-24 RX ORDER — SODIUM CHLORIDE 0.9 % (FLUSH) 0.9 %
10 SYRINGE (ML) INJECTION EVERY 12 HOURS SCHEDULED
Status: DISCONTINUED | OUTPATIENT
Start: 2024-04-24 | End: 2024-04-25 | Stop reason: HOSPADM

## 2024-04-24 RX ORDER — PROPOFOL 10 MG/ML
VIAL (ML) INTRAVENOUS AS NEEDED
Status: DISCONTINUED | OUTPATIENT
Start: 2024-04-24 | End: 2024-04-24 | Stop reason: SURG

## 2024-04-24 RX ORDER — SODIUM CHLORIDE, SODIUM LACTATE, POTASSIUM CHLORIDE, CALCIUM CHLORIDE 600; 310; 30; 20 MG/100ML; MG/100ML; MG/100ML; MG/100ML
1000 INJECTION, SOLUTION INTRAVENOUS CONTINUOUS
Status: DISCONTINUED | OUTPATIENT
Start: 2024-04-24 | End: 2024-04-24

## 2024-04-24 RX ORDER — NALOXONE HCL 0.4 MG/ML
0.4 VIAL (ML) INJECTION
Status: DISCONTINUED | OUTPATIENT
Start: 2024-04-24 | End: 2024-04-25 | Stop reason: HOSPADM

## 2024-04-24 RX ORDER — TAMSULOSIN HYDROCHLORIDE 0.4 MG/1
0.4 CAPSULE ORAL DAILY
Status: DISCONTINUED | OUTPATIENT
Start: 2024-04-24 | End: 2024-04-25 | Stop reason: HOSPADM

## 2024-04-24 RX ORDER — FENTANYL CITRATE 50 UG/ML
INJECTION, SOLUTION INTRAMUSCULAR; INTRAVENOUS AS NEEDED
Status: DISCONTINUED | OUTPATIENT
Start: 2024-04-24 | End: 2024-04-24 | Stop reason: SURG

## 2024-04-24 RX ORDER — OXYBUTYNIN CHLORIDE 5 MG/1
10 TABLET ORAL NIGHTLY
Status: DISCONTINUED | OUTPATIENT
Start: 2024-04-24 | End: 2024-04-25 | Stop reason: HOSPADM

## 2024-04-24 RX ORDER — ALBUMIN, HUMAN INJ 5% 5 %
SOLUTION INTRAVENOUS
Status: COMPLETED
Start: 2024-04-24 | End: 2024-04-24

## 2024-04-24 RX ORDER — ALBUMIN, HUMAN INJ 5% 5 %
250 SOLUTION INTRAVENOUS ONCE AS NEEDED
Status: DISCONTINUED | OUTPATIENT
Start: 2024-04-24 | End: 2024-04-24 | Stop reason: HOSPADM

## 2024-04-24 RX ORDER — PSEUDOEPHEDRINE HCL 30 MG
60 TABLET ORAL EVERY 4 HOURS PRN
Status: DISCONTINUED | OUTPATIENT
Start: 2024-04-24 | End: 2024-04-25 | Stop reason: HOSPADM

## 2024-04-24 RX ORDER — FLUOXETINE HYDROCHLORIDE 20 MG/1
20 CAPSULE ORAL DAILY
Status: DISCONTINUED | OUTPATIENT
Start: 2024-04-24 | End: 2024-04-25 | Stop reason: HOSPADM

## 2024-04-24 RX ORDER — SODIUM CHLORIDE 9 MG/ML
40 INJECTION, SOLUTION INTRAVENOUS AS NEEDED
Status: DISCONTINUED | OUTPATIENT
Start: 2024-04-24 | End: 2024-04-24 | Stop reason: HOSPADM

## 2024-04-24 RX ORDER — HYDROMORPHONE HYDROCHLORIDE 1 MG/ML
0.2 INJECTION, SOLUTION INTRAMUSCULAR; INTRAVENOUS; SUBCUTANEOUS
Status: DISCONTINUED | OUTPATIENT
Start: 2024-04-24 | End: 2024-04-24 | Stop reason: HOSPADM

## 2024-04-24 RX ORDER — HEPARIN SODIUM 1000 [USP'U]/ML
INJECTION, SOLUTION INTRAVENOUS; SUBCUTANEOUS AS NEEDED
Status: DISCONTINUED | OUTPATIENT
Start: 2024-04-24 | End: 2024-04-24 | Stop reason: SURG

## 2024-04-24 RX ORDER — ATORVASTATIN CALCIUM 40 MG/1
80 TABLET, FILM COATED ORAL DAILY
Status: DISCONTINUED | OUTPATIENT
Start: 2024-04-24 | End: 2024-04-25 | Stop reason: HOSPADM

## 2024-04-24 RX ORDER — AMLODIPINE BESYLATE 10 MG/1
10 TABLET ORAL
Status: DISCONTINUED | OUTPATIENT
Start: 2024-04-24 | End: 2024-04-25 | Stop reason: HOSPADM

## 2024-04-24 RX ORDER — CLOPIDOGREL BISULFATE 75 MG/1
75 TABLET ORAL DAILY
Status: DISCONTINUED | OUTPATIENT
Start: 2024-04-25 | End: 2024-04-25 | Stop reason: HOSPADM

## 2024-04-24 RX ORDER — FENOFIBRATE 48 MG/1
48 TABLET, COATED ORAL DAILY
Status: DISCONTINUED | OUTPATIENT
Start: 2024-04-24 | End: 2024-04-25 | Stop reason: HOSPADM

## 2024-04-24 RX ORDER — LISINOPRIL 40 MG/1
40 TABLET ORAL 2 TIMES DAILY
COMMUNITY

## 2024-04-24 RX ORDER — SODIUM CHLORIDE, SODIUM LACTATE, POTASSIUM CHLORIDE, CALCIUM CHLORIDE 600; 310; 30; 20 MG/100ML; MG/100ML; MG/100ML; MG/100ML
100 INJECTION, SOLUTION INTRAVENOUS CONTINUOUS
Status: DISCONTINUED | OUTPATIENT
Start: 2024-04-24 | End: 2024-04-24

## 2024-04-24 RX ORDER — ACETAMINOPHEN 500 MG
1000 TABLET ORAL ONCE
Status: COMPLETED | OUTPATIENT
Start: 2024-04-24 | End: 2024-04-24

## 2024-04-24 RX ORDER — ONDANSETRON 2 MG/ML
4 INJECTION INTRAMUSCULAR; INTRAVENOUS
Status: DISCONTINUED | OUTPATIENT
Start: 2024-04-24 | End: 2024-04-24 | Stop reason: HOSPADM

## 2024-04-24 RX ORDER — METOPROLOL SUCCINATE 100 MG/1
100 TABLET, EXTENDED RELEASE ORAL DAILY
COMMUNITY

## 2024-04-24 RX ORDER — ACETAMINOPHEN 325 MG/1
650 TABLET ORAL EVERY 4 HOURS PRN
Status: DISCONTINUED | OUTPATIENT
Start: 2024-04-24 | End: 2024-04-25 | Stop reason: HOSPADM

## 2024-04-24 RX ORDER — NALOXONE HCL 0.4 MG/ML
0.04 VIAL (ML) INJECTION AS NEEDED
Status: DISCONTINUED | OUTPATIENT
Start: 2024-04-24 | End: 2024-04-24 | Stop reason: HOSPADM

## 2024-04-24 RX ORDER — SODIUM CHLORIDE 0.9 % (FLUSH) 0.9 %
10 SYRINGE (ML) INJECTION AS NEEDED
Status: DISCONTINUED | OUTPATIENT
Start: 2024-04-24 | End: 2024-04-24 | Stop reason: HOSPADM

## 2024-04-24 RX ORDER — LABETALOL HYDROCHLORIDE 5 MG/ML
5 INJECTION, SOLUTION INTRAVENOUS
Status: DISCONTINUED | OUTPATIENT
Start: 2024-04-24 | End: 2024-04-24 | Stop reason: HOSPADM

## 2024-04-24 RX ORDER — DROPERIDOL 2.5 MG/ML
0.62 INJECTION, SOLUTION INTRAMUSCULAR; INTRAVENOUS ONCE AS NEEDED
Status: DISCONTINUED | OUTPATIENT
Start: 2024-04-24 | End: 2024-04-24 | Stop reason: HOSPADM

## 2024-04-24 RX ORDER — ROCURONIUM BROMIDE 10 MG/ML
INJECTION, SOLUTION INTRAVENOUS AS NEEDED
Status: DISCONTINUED | OUTPATIENT
Start: 2024-04-24 | End: 2024-04-24 | Stop reason: SURG

## 2024-04-24 RX ORDER — CLOPIDOGREL BISULFATE 75 MG/1
300 TABLET ORAL DAILY
Status: DISCONTINUED | OUTPATIENT
Start: 2024-04-24 | End: 2024-04-24 | Stop reason: HOSPADM

## 2024-04-24 RX ORDER — PHENYLEPHRINE HCL IN 0.9% NACL 50MG/250ML
.5-3 PLASTIC BAG, INJECTION (ML) INTRAVENOUS
Status: DISCONTINUED | OUTPATIENT
Start: 2024-04-24 | End: 2024-04-24

## 2024-04-24 RX ORDER — OXYBUTYNIN CHLORIDE 10 MG/1
10 TABLET, EXTENDED RELEASE ORAL DAILY
COMMUNITY

## 2024-04-24 RX ORDER — HYDRALAZINE HYDROCHLORIDE 20 MG/ML
10 INJECTION INTRAMUSCULAR; INTRAVENOUS EVERY 4 HOURS PRN
Status: DISCONTINUED | OUTPATIENT
Start: 2024-04-24 | End: 2024-04-25 | Stop reason: HOSPADM

## 2024-04-24 RX ORDER — HYDROCODONE BITARTRATE AND ACETAMINOPHEN 5; 325 MG/1; MG/1
1 TABLET ORAL ONCE AS NEEDED
Status: DISCONTINUED | OUTPATIENT
Start: 2024-04-24 | End: 2024-04-24 | Stop reason: HOSPADM

## 2024-04-24 RX ORDER — FENTANYL CITRATE 50 UG/ML
50 INJECTION, SOLUTION INTRAMUSCULAR; INTRAVENOUS
Status: DISCONTINUED | OUTPATIENT
Start: 2024-04-24 | End: 2024-04-24 | Stop reason: HOSPADM

## 2024-04-24 RX ORDER — ASPIRIN 81 MG/1
324 TABLET, CHEWABLE ORAL ONCE
Status: COMPLETED | OUTPATIENT
Start: 2024-04-24 | End: 2024-04-24

## 2024-04-24 RX ORDER — ONDANSETRON 4 MG/1
4 TABLET, ORALLY DISINTEGRATING ORAL EVERY 6 HOURS PRN
Status: DISCONTINUED | OUTPATIENT
Start: 2024-04-24 | End: 2024-04-25 | Stop reason: HOSPADM

## 2024-04-24 RX ORDER — QUETIAPINE FUMARATE 25 MG/1
25 TABLET, FILM COATED ORAL 2 TIMES DAILY
Status: DISCONTINUED | OUTPATIENT
Start: 2024-04-24 | End: 2024-04-25 | Stop reason: HOSPADM

## 2024-04-24 RX ORDER — LIDOCAINE HYDROCHLORIDE 10 MG/ML
0.5 INJECTION, SOLUTION EPIDURAL; INFILTRATION; INTRACAUDAL; PERINEURAL ONCE AS NEEDED
Status: DISCONTINUED | OUTPATIENT
Start: 2024-04-24 | End: 2024-04-24 | Stop reason: HOSPADM

## 2024-04-24 RX ORDER — LIDOCAINE HYDROCHLORIDE 40 MG/ML
SOLUTION TOPICAL AS NEEDED
Status: DISCONTINUED | OUTPATIENT
Start: 2024-04-24 | End: 2024-04-24 | Stop reason: SURG

## 2024-04-24 RX ORDER — DIAZEPAM 10 MG/1
10 TABLET ORAL 3 TIMES DAILY PRN
Status: DISCONTINUED | OUTPATIENT
Start: 2024-04-24 | End: 2024-04-25 | Stop reason: HOSPADM

## 2024-04-24 RX ORDER — SODIUM CHLORIDE 0.9 % (FLUSH) 0.9 %
3 SYRINGE (ML) INJECTION AS NEEDED
Status: DISCONTINUED | OUTPATIENT
Start: 2024-04-24 | End: 2024-04-24 | Stop reason: HOSPADM

## 2024-04-24 RX ORDER — SODIUM CHLORIDE 9 MG/ML
40 INJECTION, SOLUTION INTRAVENOUS AS NEEDED
Status: DISCONTINUED | OUTPATIENT
Start: 2024-04-24 | End: 2024-04-25 | Stop reason: HOSPADM

## 2024-04-24 RX ORDER — LIDOCAINE HYDROCHLORIDE 20 MG/ML
INJECTION, SOLUTION EPIDURAL; INFILTRATION; INTRACAUDAL; PERINEURAL AS NEEDED
Status: DISCONTINUED | OUTPATIENT
Start: 2024-04-24 | End: 2024-04-24 | Stop reason: SURG

## 2024-04-24 RX ORDER — HYDROCODONE BITARTRATE AND ACETAMINOPHEN 5; 325 MG/1; MG/1
1 TABLET ORAL EVERY 4 HOURS PRN
Status: DISCONTINUED | OUTPATIENT
Start: 2024-04-24 | End: 2024-04-25 | Stop reason: HOSPADM

## 2024-04-24 RX ORDER — ONDANSETRON 2 MG/ML
4 INJECTION INTRAMUSCULAR; INTRAVENOUS EVERY 6 HOURS PRN
Status: DISCONTINUED | OUTPATIENT
Start: 2024-04-24 | End: 2024-04-25 | Stop reason: HOSPADM

## 2024-04-24 RX ADMIN — TAMSULOSIN HYDROCHLORIDE 0.4 MG: 0.4 CAPSULE ORAL at 17:31

## 2024-04-24 RX ADMIN — PROTAMINE SULFATE 30 MG: 10 INJECTION, SOLUTION INTRAVENOUS at 10:47

## 2024-04-24 RX ADMIN — PROPOFOL INJECTABLE EMULSION 120 MG: 10 INJECTION, EMULSION INTRAVENOUS at 10:13

## 2024-04-24 RX ADMIN — OXYBUTYNIN CHLORIDE 10 MG: 5 TABLET ORAL at 20:56

## 2024-04-24 RX ADMIN — FLUOXETINE HYDROCHLORIDE 20 MG: 20 CAPSULE ORAL at 17:31

## 2024-04-24 RX ADMIN — Medication 10 ML: at 20:59

## 2024-04-24 RX ADMIN — CEFAZOLIN 2000 MG: 2 INJECTION, POWDER, FOR SOLUTION INTRAMUSCULAR; INTRAVENOUS at 17:32

## 2024-04-24 RX ADMIN — HEPARIN SODIUM 8000 UNITS: 1000 INJECTION, SOLUTION INTRAVENOUS; SUBCUTANEOUS at 10:22

## 2024-04-24 RX ADMIN — METOPROLOL SUCCINATE 100 MG: 100 TABLET, FILM COATED, EXTENDED RELEASE ORAL at 17:31

## 2024-04-24 RX ADMIN — SODIUM CHLORIDE, POTASSIUM CHLORIDE, SODIUM LACTATE AND CALCIUM CHLORIDE 1000 ML: 600; 310; 30; 20 INJECTION, SOLUTION INTRAVENOUS at 08:25

## 2024-04-24 RX ADMIN — LIDOCAINE HYDROCHLORIDE 1 EACH: 40 SOLUTION TOPICAL at 10:15

## 2024-04-24 RX ADMIN — TRAZODONE HYDROCHLORIDE 150 MG: 100 TABLET ORAL at 20:56

## 2024-04-24 RX ADMIN — CLOPIDOGREL BISULFATE 300 MG: 75 TABLET ORAL at 09:55

## 2024-04-24 RX ADMIN — LIDOCAINE HYDROCHLORIDE 100 MG: 20 INJECTION, SOLUTION EPIDURAL; INFILTRATION; INTRACAUDAL; PERINEURAL at 10:13

## 2024-04-24 RX ADMIN — ASPIRIN 324 MG: 81 TABLET, CHEWABLE ORAL at 09:55

## 2024-04-24 RX ADMIN — ROCURONIUM BROMIDE 50 MG: 10 INJECTION, SOLUTION INTRAVENOUS at 10:13

## 2024-04-24 RX ADMIN — PHENYLEPHRINE HYDROCHLORIDE 5 MCG/MIN: 10 INJECTION INTRAVENOUS at 10:26

## 2024-04-24 RX ADMIN — GLYCOPYRROLATE 0.2 MG: 0.2 INJECTION INTRAMUSCULAR; INTRAVENOUS at 10:13

## 2024-04-24 RX ADMIN — ALBUMIN (HUMAN) 250 ML: 12.5 INJECTION, SOLUTION INTRAVENOUS at 11:37

## 2024-04-24 RX ADMIN — LISINOPRIL 40 MG: 20 TABLET ORAL at 20:56

## 2024-04-24 RX ADMIN — HYDROCODONE BITARTRATE AND ACETAMINOPHEN 1 TABLET: 5; 325 TABLET ORAL at 17:32

## 2024-04-24 RX ADMIN — AMLODIPINE BESYLATE 10 MG: 10 TABLET ORAL at 17:32

## 2024-04-24 RX ADMIN — ATORVASTATIN CALCIUM 80 MG: 40 TABLET, FILM COATED ORAL at 17:32

## 2024-04-24 RX ADMIN — CEFAZOLIN 1000 MG: 1 INJECTION, POWDER, FOR SOLUTION INTRAMUSCULAR; INTRAVENOUS at 10:13

## 2024-04-24 RX ADMIN — QUETIAPINE FUMARATE 25 MG: 25 TABLET, FILM COATED ORAL at 20:56

## 2024-04-24 RX ADMIN — ACETAMINOPHEN 1000 MG: 500 TABLET, FILM COATED ORAL at 10:03

## 2024-04-24 RX ADMIN — ALBUMIN, HUMAN INJ 5% 250 ML: 5 SOLUTION at 11:37

## 2024-04-24 RX ADMIN — FENTANYL CITRATE 100 MCG: 50 INJECTION, SOLUTION INTRAMUSCULAR; INTRAVENOUS at 10:13

## 2024-04-24 RX ADMIN — ONDANSETRON 4 MG: 2 INJECTION INTRAMUSCULAR; INTRAVENOUS at 10:13

## 2024-04-24 RX ADMIN — FENOFIBRATE 48 MG: 48 TABLET ORAL at 17:31

## 2024-04-24 RX ADMIN — SUGAMMADEX 200 MG: 100 INJECTION, SOLUTION INTRAVENOUS at 10:54

## 2024-04-24 NOTE — OP NOTE
Campbell Casas  4/24/2024     PREOPERATIVE DIAGNOSIS: Carotid stenosis, asymptomatic, right [I65.21]  Preoperative testing [Z01.818]     POSTOPERATIVE DIAGNOSIS: Post-Op Diagnosis Codes:     * Carotid stenosis, asymptomatic, right [I65.21]     * Preoperative testing [Z01.818]     PROCEDURE PERFORMED:   1.  Right internal carotid artery TCAR (transcarotid artery revascularization) with the ENROUTE transcarotid neuro protection and stent system     SURGEON: Esequiel Vaz DO      ANESTHESIA: General    PREPARATION: Routine.    STAFF: Circulator: Sussy Mckeon RN  Scrub Person: Marla Green; Adria Gautam  Vendor Representative: Yu Traylor  Assistant: Renee Cross  Vascular Radiology Technician: Micaela Kumar    Estimated Blood Loss: minimal    SPECIMENS: None    COMPLICATIONS: None    INDICATIONS: Campbell Casas is a 69 y.o. male who was having complaints of pain to his legs for the past couple of years. He really describes bilateral hip and buttock pain and reports they lock up on him. He did undergo an aortoiliac angiogram with intravascular lithotripsy of the right common femoral and proximal small superficial femoral arteries with lithotripsy of the right and left external iliac arteries. He also underwent a left common femoral endarterectomy on 7/13/2022. He has having more dizzy spells than he was having before. He reports he can barely walk at times. Unfortunately continues to smoke but reports he is down to 14 cigarettes/day. He did have noninvasive testing performed today, which I did review in office. The indications, risks, and possible complications of the procedure were explained to the patient, who voiced understanding and wished to proceed with surgery.     PROCEDURE IN DETAIL: The patient was taken to the operating room and placed on the operating table in a supine position. After general anesthesia was obtained, the right neck and bilateral groins were prepped and draped  in a sterile manner.   A 4 cm transverse incision was made between the sternal and clavicular heads of the sternocleidomastoid muscle below the omohyoid.  Following longitudinal division of the carotid sheath the jugular vein was partially dissected and retracted laterally.  Once 3 cm of common carotid artery were isolated, the vessel loop was placed around the proximal one third of the common carotid artery under direct visualization.  A 5-0 Prolene suture was preplaced in the anterior wall the common carotid artery, in a Z stitch configuration, close to the clavicle to facilitate hemostasis upon removal of the arterial sheath at completion of the procedure.  The contralateral left common femoral vein was accessed under ultrasound guidance, using standard Seldinger and micropuncture access technique.  The venous return sheath was advanced into the common femoral vein over the advantage Glidewire.  Blood was aspirated from the flow line followed by flushing of the venous sheath with heparinized saline.  The venous sheath was secured to the patient's skin with a 2-0 silk suture to maintain optimal position in the vessel.  8000 units of intravenous heparin was given to obtain a therapeutic ACT greater than 250 seconds prior to arterial access.  A 4 Bulgarian micropuncture set was used, puncturing the artery with a 21-gauge needle through the preplaced Z stitch while holding gentle traction on the vessel loop to stabilize and centralize the common carotid artery within the incision.  Careful attention was paid to the change in common carotid shape when using the vessel loop to control or lift the artery.  The micropuncture wire was then advanced into the external carotid artery and the 21-gauge needle was removed.  The micropuncture sheath was advanced into the external carotid artery and the wire and dilator were removed.  Pulsatile backflow indicated correct positioning.  The J-wire was then inserted into the external  carotid artery.  After micro puncture sheath removal, the transcarotid arterial sheath was advanced to the 2.5 cm marker and the J-wire and dilator were removed.    The arterial sheath position was assessed under fluoroscopy in 2 projections to ensure that the sheath tip was oriented coaxially in the common carotid artery.  The arterial sheath was sutured to the patient in 2 separate areas.  Blood was slowly aspirated followed by flushing with heparinized saline.  Next, the flow controller was connected to the transcarotid arterial sheath, prepared by passively allowing a column of arterial blood to fill the line and connected to the venous return sheath.  The common carotid inflow was occluded proximal to the arteriotomy with a profunda clamp to achieve active flow reversal.  To confirm flow reversal, a saline bolus was delivered in the venous flow line on both high and low flow settings of the flow controller.  Angiograms were performed with slow injections of a small amount of contrast filling just past the lesion to minimize antegrade transmission of microbubbles.  Prior to lesion manipulation, heart rate and blood pressure were managed upwards to optimize flow reversal and procedural neuro protection.  The lesion was crossed with an 014 guidewire and predilatation was performed with a 6 x 30 mm Enflate balloon.  Next, primary stenting was performed with the 10-8 x 40 mm ENROUTE transcarotid tapered stent.  Imaging was performed in both in Sami and CARRERA projections to ensure the stent was fully deployed.  Completion angiogram also showed the stent to be widely patent without any residual stenosis, dissection, or occlusion.  [facility] antegrade flow was restored by releasing the clamp on the common carotid artery then closing the NPS stopcocks to the flow lines.  The transcarotid arterial sheath was removed and the preclosure suture was tied in place.  D mg of protamine was given intravenously for  reversal.  The venous return sheath from the left groin was removed and hemostasis was achieved with 10 to 15 minutes of manual compression.  The neck wound bed was irrigated with antibiotic saline and hemostasis was observed.  The 2 heads of the sternocleidomastoid were carefully sutured back together with a 3-0 Vicryl in a running fashion.  The platysma muscle was reapproximated using a 3-0 Vicryl in a running fashion.  The skin was then reapproximated using a 4-0 Monocryl in a subcuticular fashion. Sterile dressings were applied. The patient tolerated the procedure well. Sponge and needle counts were correct. The patient was then awakened and extubated in the operating room and taken to the recovery room in good condition.    ICA stenosis: 85%  Stenosis after stent placement: 0%  Lesion length: 36 mm  Lesion calcification: Moderate  Lesion at level: C3  Neuro: Intact  ICA tortuosity: Severe  Arch type: Type 1  Flow reversal time: 6 minutes  Norm: 0  Contrast amount: 12 mL  ACT: 271    Esequiel Vaz DO  Date: 4/24/2024 Time: 11:02 CDT    CC:Yaa Fox APRN

## 2024-04-24 NOTE — ANESTHESIA POSTPROCEDURE EVALUATION
Patient: Campbell Casas    Procedure Summary       Date: 04/24/24 Room / Location: [facility] OR  / [facility]    Anesthesia Start: 1010 Anesthesia Stop: 1115    Procedure: RIGHT TRANSCAROTID ARTERY REVASCULARIZATION (Right: Neck) Diagnosis:       Carotid stenosis, asymptomatic, right      Preoperative testing      (Carotid stenosis, asymptomatic, right [I65.21])      (Preoperative testing [Z01.818])    Surgeons: Esequiel Vaz DO Provider: Elier Hightower CRNA    Anesthesia Type: general ASA Status: 3            Anesthesia Type: general    Vitals  Vitals Value Taken Time   /64 04/24/24 1326   Temp 97.9 °F (36.6 °C) 04/24/24 1324   Pulse 67 04/24/24 1326   Resp 16 04/24/24 1324   SpO2 96 % 04/24/24 1326   Vitals shown include unfiled device data.        Post Anesthesia Care and Evaluation    Patient location during evaluation: PACU  Patient participation: complete - patient participated  Level of consciousness: awake and alert  Pain management: adequate    Airway patency: patent  Anesthetic complications: No anesthetic complications  PONV Status: none  Cardiovascular status: acceptable and hemodynamically stable  Respiratory status: acceptable  Hydration status: acceptable    Comments: Blood pressure 118/53, pulse 94, temperature 97.4 °F (36.3 °C), temperature source Oral, resp. rate 16, weight 89.9 kg (198 lb 3.1 oz), SpO2 90%.    Patient discharged from PACU based upon Ellie score. Please see RN notes for further details

## 2024-04-24 NOTE — ANESTHESIA PREPROCEDURE EVALUATION
Anesthesia Evaluation     no history of anesthetic complications:   NPO Solid Status: > 8 hours  NPO Liquid Status: > 8 hours           Airway   Mallampati: I  TM distance: >3 FB  Neck ROM: full  No difficulty expected  Dental    (+) edentulous    Pulmonary    (+) a smoker Current,  Cardiovascular   Exercise tolerance: poor (<4 METS)    (+) hypertension, CAD, CHF , PVD, hyperlipidemia    ROS comment: Stress test 6/21/22  · Myocardial perfusion imaging indicates a medium-sized infarct located in the basal to mid inferior wall with no significant ischemia noted.  · Left ventricular ejection fraction is normal. There is akinesis of the basal to mid inferior wall. (Calculated EF = 60%).  · Impressions are consistent with a low risk study.     Echo 6/2022  · Left ventricular ejection fraction appears to be 56 - 60%. Left ventricular systolic function is normal.  · Left ventricular wall thickness is consistent with moderate concentric hypertrophy.  · The following left ventricular wall segments are akinetic: basal inferior.  · Left ventricular diastolic function is consistent with (grade I) impaired relaxation.  · Normal right ventricular cavity size and systolic function noted.  · There is no significant (greater than mild) valvular dysfunction.         Neuro/Psych  (+) psychiatric history Anxiety and Depression  (-) seizures, TIA, CVA  GI/Hepatic/Renal/Endo    (+) obesity, renal disease- CRI  (-) liver disease, diabetes    Musculoskeletal     Abdominal    Substance History      OB/GYN          Other                          Anesthesia Plan    ASA 3     general     intravenous induction     Anesthetic plan, risks, benefits, and alternatives have been provided, discussed and informed consent has been obtained with: patient.        CODE STATUS:

## 2024-04-24 NOTE — NURSING NOTE
"Pt very poor historian on medication. States he does not know what medications he take nor does he know when he takes them. Some medications from the pharmacy he doesn't take or has ran out. States he takes \"4 or 5 in the morning and 3 or 4\" at night and sometimes he doesn't take any at all. I explained the importance of being informed of his medications so that we can properly treat him while he's here in the hospital and he states \"they can give me whatever they want\".  "

## 2024-04-24 NOTE — PLAN OF CARE
Goal Outcome Evaluation:  Plan of Care Reviewed With: patient        Progress: no change  Outcome Evaluation: ivf at [facility], iv abx cont. no neuro deficits. medicated x1 for  pain per order. tolerates diet. voiding without difficulty. drsg right neck and left groin intact. cont to monitor.

## 2024-04-24 NOTE — ANESTHESIA PROCEDURE NOTES
Arterial Line    Pre-sedation assessment completed: 4/24/2024 10:06 AM    Patient reassessed immediately prior to procedure    Patient location during procedure: pre-op  Start time: 4/24/2024 10:06 AM  Stop Time:4/24/2024 10:06 AM       Line placed for hemodynamic monitoring.  Performed By   Anesthesiologist: Sommer Braxton MD   Preanesthetic Checklist  Completed: patient identified, IV checked, site marked, risks and benefits discussed, surgical consent, monitors and equipment checked, pre-op evaluation and timeout performed  Arterial Line Prep    Sterile Tech: cap and gloves  Prep: ChloraPrep  Patient monitoring: continuous pulse oximetry  Arterial Line Procedure   Laterality:left  Location:  radial artery  Catheter size: 20 G   Guidance: ultrasound guided  PROCEDURE NOTE/ULTRASOUND INTERPRETATION.  Using ultrasound guidance the potential vascular sites for insertion of the catheter were visualized to determine the patency of the vessel to be used for vascular access.  After selecting the appropriate site for insertion, the needle was visualized under ultrasound being inserted into the radial artery, followed by ultrasound confirmation of wire and catheter placement. There were no abnormalities seen on ultrasound; an image was taken; and the patient tolerated the procedure with no complications.   Number of attempts: 1  Successful placement: yes Images: still images not obtained  Post Assessment   Dressing Type: secured with tape.   Complications no  Circ/Move/Sens Assessment: unchanged and normal.   Patient Tolerance: patient tolerated the procedure well with no apparent complications

## 2024-04-24 NOTE — ANESTHESIA PROCEDURE NOTES
Airway  Urgency: elective    Date/Time: 4/24/2024 10:15 AM  Airway not difficult    General Information and Staff    Patient location during procedure: OR  CRNA/CAA: Elier Hightower CRNA    Indications and Patient Condition  Indications for airway management: airway protection    Preoxygenated: yes  Mask difficulty assessment: 1 - vent by mask    Final Airway Details  Final airway type: endotracheal airway      Successful airway: ETT  Cuffed: yes   Successful intubation technique: direct laryngoscopy  Endotracheal tube insertion site: oral  Blade: Elam  Blade size: 2  ETT size (mm): 7.5  Cormack-Lehane Classification: grade I - full view of glottis  Placement verified by: capnometry   Measured from: lips  ETT/EBT  to lips (cm): 22  Number of attempts at approach: 1  Assessment: lips, teeth, and gum same as pre-op and atraumatic intubation

## 2024-04-25 ENCOUNTER — READMISSION MANAGEMENT (OUTPATIENT)
Dept: CALL CENTER | Facility: HOSPITAL | Age: 69
End: 2024-04-25
Payer: MEDICARE

## 2024-04-25 VITALS
TEMPERATURE: 98 F | SYSTOLIC BLOOD PRESSURE: 116 MMHG | DIASTOLIC BLOOD PRESSURE: 100 MMHG | RESPIRATION RATE: 16 BRPM | OXYGEN SATURATION: 92 % | WEIGHT: 198.19 LBS | HEIGHT: 67 IN | BODY MASS INDEX: 31.11 KG/M2 | HEART RATE: 68 BPM

## 2024-04-25 LAB — ACT BLD: 271 SECONDS (ref 82–152)

## 2024-04-25 PROCEDURE — 25010000002 CEFAZOLIN PER 500 MG: Performed by: SURGERY

## 2024-04-25 PROCEDURE — 99024 POSTOP FOLLOW-UP VISIT: CPT | Performed by: NURSE PRACTITIONER

## 2024-04-25 RX ORDER — NALOXONE HYDROCHLORIDE 4 MG/.1ML
SPRAY NASAL
Qty: 2 EACH | Refills: 0 | Status: SHIPPED | OUTPATIENT
Start: 2024-04-25

## 2024-04-25 RX ORDER — HYDROCODONE BITARTRATE AND ACETAMINOPHEN 5; 325 MG/1; MG/1
1 TABLET ORAL EVERY 4 HOURS PRN
Qty: 18 TABLET | Refills: 0 | Status: SHIPPED | OUTPATIENT
Start: 2024-04-25 | End: 2024-04-28

## 2024-04-25 RX ADMIN — Medication 10 ML: at 08:17

## 2024-04-25 RX ADMIN — CEFAZOLIN 2000 MG: 2 INJECTION, POWDER, FOR SOLUTION INTRAMUSCULAR; INTRAVENOUS at 02:50

## 2024-04-25 RX ADMIN — FLUOXETINE HYDROCHLORIDE 20 MG: 20 CAPSULE ORAL at 08:17

## 2024-04-25 RX ADMIN — METOPROLOL SUCCINATE 100 MG: 100 TABLET, FILM COATED, EXTENDED RELEASE ORAL at 08:17

## 2024-04-25 RX ADMIN — AMLODIPINE BESYLATE 10 MG: 10 TABLET ORAL at 08:17

## 2024-04-25 RX ADMIN — LISINOPRIL 40 MG: 20 TABLET ORAL at 08:17

## 2024-04-25 RX ADMIN — ASPIRIN 81 MG: 81 TABLET, COATED ORAL at 08:17

## 2024-04-25 RX ADMIN — TAMSULOSIN HYDROCHLORIDE 0.4 MG: 0.4 CAPSULE ORAL at 08:17

## 2024-04-25 RX ADMIN — ATORVASTATIN CALCIUM 80 MG: 40 TABLET, FILM COATED ORAL at 08:17

## 2024-04-25 RX ADMIN — FENOFIBRATE 48 MG: 48 TABLET ORAL at 08:17

## 2024-04-25 RX ADMIN — QUETIAPINE FUMARATE 25 MG: 25 TABLET, FILM COATED ORAL at 08:17

## 2024-04-25 RX ADMIN — CLOPIDOGREL BISULFATE 75 MG: 75 TABLET, FILM COATED ORAL at 08:17

## 2024-04-25 NOTE — DISCHARGE SUMMARY
Date of Discharge:  4/25/2024    Discharge Diagnosis: Carotid stenosis, asymptomatic, right   Asymptomatic stenosis of right carotid artery  Stenosis of right carotid artery    Presenting Problem/History of Present Illness  Carotid stenosis, asymptomatic, right [I65.21]  Preoperative testing [Z01.818]  Asymptomatic stenosis of right carotid artery [I65.21]  Stenosis of right carotid artery [I65.21]       Hospital Course  Patient is a 69 y.o. male presented with dizzy spells and the inability to walk.  CTA of neck showed 80 to 85% focal right carotid stenosis.  The patient was transferred to  for continued care.  Overnight, the patient has done well. Campbell Casas  vitals have remained stable, his neck soft without hematoma, and without neurological deficit.  Groin soft without hematoma.  Campbell Casas  is stable and ready for discharge.  We will see him back in 2 weeks for post operative follow up.  His medications will stay the same.  Written and verbal instructions were given.  This all was discussed in full with complete understanding.        Procedures Performed  Procedure(s):  RIGHT TRANSCAROTID ARTERY REVASCULARIZATION       Consults:   Consults       No orders found for last 30 day(s).              Condition on Discharge: Stable    Discharge Medications     Discharge Medications        New Medications        Instructions Start Date   HYDROcodone-acetaminophen 5-325 MG per tablet  Commonly known as: NORCO   1 tablet, Oral, Every 4 Hours PRN      naloxone 4 MG/0.1ML nasal spray  Commonly known as: NARCAN   Call 911. Don't prime. [place] in 1 nostril for overdose. Repeat in 2-3 minutes in other nostril if no or minimal breathing/responsiveness.             Continue These Medications        Instructions Start Date   amLODIPine 10 MG tablet  Commonly known as: NORVASC   10 mg, Oral, Every Night at Bedtime      aspirin 81 MG EC tablet   81 mg, Oral, Daily      atorvastatin 80 MG tablet  Commonly known as:  LIPITOR   80 mg, Oral, Every Night at Bedtime      diazePAM 10 MG tablet  Commonly known as: VALIUM   10 mg, Oral, 3 Times Daily PRN      fenofibrate 54 MG tablet  Commonly known as: TRICOR   54 mg, Oral, Daily      FLUoxetine 20 MG capsule  Commonly known as: PROzac   20 mg, Oral, Daily      lisinopril 40 MG tablet  Commonly known as: PRINIVIL,ZESTRIL   40 mg, Oral, 2 Times Daily      metoprolol succinate  MG 24 hr tablet  Commonly known as: TOPROL-XL   100 mg, Oral, Daily      oxybutynin XL 10 MG 24 hr tablet  Commonly known as: DITROPAN-XL   10 mg, Oral, Daily      Plavix 75 MG tablet  Generic drug: clopidogrel   1 tablet, Oral, Daily      QUEtiapine 25 MG tablet  Commonly known as: SEROquel   1 tablet, Oral, 2 Times Daily      tamsulosin 0.4 MG capsule 24 hr capsule  Commonly known as: FLOMAX   1 capsule, Oral, Every Night at Bedtime      thiamine 100 MG tablet  tablet  Commonly known as: VITAMIN B-1   100 mg, Oral, Daily      traZODone 150 MG tablet  Commonly known as: DESYREL   150 mg, Oral, Nightly               Discharge Diet:   Diet Instructions       Diet: Regular/House Diet; Regular (IDDSI 7); Thin (IDDSI 0)      Discharge Diet: Regular/House Diet    Texture: Regular (IDDSI 7)    Fluid Consistency: Thin (IDDSI 0)            Activity at Discharge:   Activity Instructions       Bathing Restrictions      Type of Restriction: Bathing    Bathing Restrictions: Other    Explain Bathing Restrictions: May shower tomorrow    Driving Restrictions      Type of Restriction: Driving    Driving Restrictions: No Driving (Time Limited)    Length: 1 Week    Lifting Restrictions      Type of Restriction: Lifting    Lifting Restrictions: Lifting Restriction (Indicate Limit)    Weight Limit (Pounds): 10    Length of Lifting Restriction: 1 week    Other Activity Restrictions      Type of Restriction: Other    Explain Other Restrictions: No bending, stooping, or straining            Follow-up Appointments  Future  Appointments   Date Time Provider Department Center   5/9/2024  1:00 PM Pascale Reyes APRN [facility]   10/4/2024  8:15 AM Noe Mcdonough MD [facility]     Additional Instructions for the Follow-ups that You Need to Schedule       Discharge Follow-up with Specialty: [place]; 2 Weeks   As directed      Specialty: [place]   Follow Up: 2 Weeks                 I did spend more than 30 minutes reviewing the chart, face to face encounter, and organizing discharge.    CARYN Mcduffie  04/25/24  08:09 CDT

## 2024-04-25 NOTE — PLAN OF CARE
Goal Outcome Evaluation:  Plan of Care Reviewed With: patient        Progress: improving  Outcome Evaluation: Pt resting well. No c/o pain voiced. IV abx infused. Voiding, up ad angel. Dressings to groin and neck c/d/i and soft. Neuros intact. 2L O2 at night due to desaturation during sleep. Safety maintained.

## 2024-04-26 NOTE — OUTREACH NOTE
Prep Survey      Flowsheet Row Responses   [facility] facility patient discharged from? [place]   Is LACE score < 7 ? No   Eligibility Readm Mgmt   Discharge diagnosis Carotid stenosis, asymptomatic, RIGHT TRANSCAROTID ARTERY REVASCULARIZATION   Does the patient have one of the following disease processes/diagnoses(primary or secondary)? General Surgery   Does the patient have Home health ordered? No   Is there a DME ordered? No   Prep survey completed? Yes            Sagrario FELIPE - Registered Nurse

## 2024-04-26 NOTE — PROGRESS NOTES
"Enter Query Response Below      Query Response: Chronic diastolic heart failure/HFpEF    Electronically signed by Esequiel Vaz DO, 24, 10:03 AM CDT.               If applicable, please update the problem list.     Patient: Campbell Casas        : 1955  Account: [id number]           Admit Date: 2024        How to Respond to this query:       a. Click New Note     b. Answer query within the yellow box.                c. Update the Problem List, if applicable.      If you have any questions about this query contact me at: [email or web address]     Dr. Vaz,    69-year-old male admitted with right carotid stenosis that underwent right internal carotid artery TCAR (trans carotid artery revascularization) with the ENROUTE trans carotid neuro protection and stent system. H&P documents past medical history of \"congestive heart failure, chronic kidney disease stage 4 & hypertension.\" Anesthesia record documents \"hypertension, CAD, CHF, hyperlipidemia, chronic renal insufficiency and echo from 2022 showing EF 56-60%, LVH and grade I diastolic dysfunction.\" Patient's home medications include Norvasc, Aspirin, Lipitor, Plavix, Lisinopril and Toprol XL. The patient was treated with continuing home medications.     Please clarify the type of heart failure treated/monitored:    - Chronic diastolic heart failure/HFpEF  - Other- specify_________  - Unable to determine    By submitting this query, we are merely seeking further clarification of documentation to accurately reflect all conditions that you are monitoring, evaluating, treating or that extend the hospitalization or utilize additional resources of care. Please utilize your independent clinical judgment when addressing the question(s) above.     This query and your response, once completed, will be entered into the legal medical record.    Sincerely,  Michael Welsh RN   Clinical Documentation Integrity Program   "

## 2024-04-29 ENCOUNTER — READMISSION MANAGEMENT (OUTPATIENT)
Dept: CALL CENTER | Facility: HOSPITAL | Age: 69
End: 2024-04-29
Payer: MEDICARE

## 2024-04-29 NOTE — OUTREACH NOTE
General Surgery Week 1 Survey      Flowsheet Row Responses   [facility] facility patient discharged from? [place]   Does the patient have one of the following disease processes/diagnoses(primary or secondary)? General Surgery   Week 1 attempt successful? No   Unsuccessful attempts Attempt 1            Rosa Elena Alcaraz Licensed Nurse

## 2024-05-09 ENCOUNTER — OFFICE VISIT (OUTPATIENT)
Dept: VASCULAR SURGERY | Facility: CLINIC | Age: 69
End: 2024-05-09
Payer: MEDICARE

## 2024-05-09 VITALS
OXYGEN SATURATION: 97 % | BODY MASS INDEX: 29.1 KG/M2 | HEART RATE: 80 BPM | HEIGHT: 68 IN | DIASTOLIC BLOOD PRESSURE: 84 MMHG | WEIGHT: 192 LBS | SYSTOLIC BLOOD PRESSURE: 158 MMHG

## 2024-05-09 DIAGNOSIS — I65.23 BILATERAL CAROTID ARTERY STENOSIS: Primary | ICD-10-CM

## 2024-05-09 DIAGNOSIS — E78.49 OTHER HYPERLIPIDEMIA: ICD-10-CM

## 2024-05-09 DIAGNOSIS — I10 ESSENTIAL HYPERTENSION: ICD-10-CM

## 2024-05-14 ENCOUNTER — READMISSION MANAGEMENT (OUTPATIENT)
Dept: CALL CENTER | Facility: HOSPITAL | Age: 69
End: 2024-05-14
Payer: MEDICARE

## 2024-05-14 NOTE — OUTREACH NOTE
General Surgery Week 3 Survey      Flowsheet Row Responses   [facility] facility patient discharged from? [place]   Does the patient have one of the following disease processes/diagnoses(primary or secondary)? General Surgery   Week 3 attempt successful? No   Unsuccessful attempts Attempt 1            Carina Alcaraz Registered Nurse

## 2024-05-21 ENCOUNTER — READMISSION MANAGEMENT (OUTPATIENT)
Dept: CALL CENTER | Facility: HOSPITAL | Age: 69
End: 2024-05-21
Payer: MEDICARE

## 2024-05-21 NOTE — OUTREACH NOTE
General Surgery Week 3 Survey      Flowsheet Row Responses   [facility] patient discharged from? [place]   Does the patient have one of the following disease processes/diagnoses(primary or secondary)? General Surgery   Week 3 attempt successful? No   Unsuccessful attempts Attempt 2   Revoke Decline to participate            Rosa Elena CAREY - Licensed Nurse

## 2024-10-17 ENCOUNTER — TELEPHONE (OUTPATIENT)
Dept: MRI IMAGING | Facility: HOSPITAL | Age: 69
End: 2024-10-17
Payer: MEDICARE

## 2024-10-17 NOTE — TELEPHONE ENCOUNTER
Multiple attempts by phone and mail were made to contact patient about being past due for CTLD lung ca screening. Unable to contact patient. Discontinuing tracking.

## 2024-11-05 ENCOUNTER — TELEPHONE (OUTPATIENT)
Dept: VASCULAR SURGERY | Facility: CLINIC | Age: 69
End: 2024-11-05
Payer: MEDICARE

## 2025-02-17 ENCOUNTER — TRANSCRIBE ORDERS (OUTPATIENT)
Dept: ADMINISTRATIVE | Facility: HOSPITAL | Age: 70
End: 2025-02-17
Payer: MEDICARE

## 2025-02-17 DIAGNOSIS — F17.210 NICOTINE DEPENDENCE, CIGARETTES, UNCOMPLICATED: Primary | ICD-10-CM

## 2025-06-23 ENCOUNTER — APPOINTMENT (OUTPATIENT)
Dept: CT IMAGING | Facility: HOSPITAL | Age: 70
End: 2025-06-23
Payer: MEDICARE

## 2025-06-23 ENCOUNTER — APPOINTMENT (OUTPATIENT)
Dept: GENERAL RADIOLOGY | Facility: HOSPITAL | Age: 70
End: 2025-06-23
Payer: MEDICARE

## 2025-06-23 ENCOUNTER — HOSPITAL ENCOUNTER (EMERGENCY)
Facility: HOSPITAL | Age: 70
Discharge: LEFT BEFORE DISCHARGE | End: 2025-06-23
Admitting: FAMILY MEDICINE
Payer: MEDICARE

## 2025-06-23 VITALS
BODY MASS INDEX: 31.5 KG/M2 | RESPIRATION RATE: 20 BRPM | HEIGHT: 66 IN | OXYGEN SATURATION: 94 % | TEMPERATURE: 98.3 F | SYSTOLIC BLOOD PRESSURE: 170 MMHG | DIASTOLIC BLOOD PRESSURE: 96 MMHG | HEART RATE: 115 BPM | WEIGHT: 196 LBS

## 2025-06-23 DIAGNOSIS — R10.84 GENERALIZED ABDOMINAL PAIN: ICD-10-CM

## 2025-06-23 DIAGNOSIS — Z53.21 ELOPED FROM EMERGENCY DEPARTMENT: Primary | ICD-10-CM

## 2025-06-23 DIAGNOSIS — R14.0 ABDOMINAL DISTENTION: ICD-10-CM

## 2025-06-23 PROCEDURE — 99283 EMERGENCY DEPT VISIT LOW MDM: CPT

## 2025-06-23 PROCEDURE — 74018 RADEX ABDOMEN 1 VIEW: CPT

## 2025-06-23 RX ORDER — HYDROMORPHONE HYDROCHLORIDE 1 MG/ML
0.5 INJECTION, SOLUTION INTRAMUSCULAR; INTRAVENOUS; SUBCUTANEOUS ONCE
Refills: 0 | Status: DISCONTINUED | OUTPATIENT
Start: 2025-06-23 | End: 2025-06-23 | Stop reason: HOSPADM

## 2025-06-23 RX ORDER — ONDANSETRON 2 MG/ML
4 INJECTION INTRAMUSCULAR; INTRAVENOUS ONCE
Status: DISCONTINUED | OUTPATIENT
Start: 2025-06-23 | End: 2025-06-23 | Stop reason: HOSPADM

## 2025-06-23 RX ORDER — SODIUM CHLORIDE 0.9 % (FLUSH) 0.9 %
10 SYRINGE (ML) INJECTION AS NEEDED
Status: DISCONTINUED | OUTPATIENT
Start: 2025-06-23 | End: 2025-06-23 | Stop reason: HOSPADM

## 2025-06-23 NOTE — ED NOTES
Pt walked up to nurses station and said he has not seen anyone in an hour so he is leaving. Primary nurse notified

## 2025-06-24 NOTE — ED PROVIDER NOTES
Subjective   History of Present Illness  Patient is a 70-year-old male who presents to the ER with chief complaints of abdominal pain.  He states he began experiencing pain a few weeks ago.  Reports nausea without any vomiting.  He reports constipation, no diarrhea.  Denies any recorded fevers.  Due to symptoms described came to the ER for evaluation and treatment.  Past medical history significant for anxiety, CHF, depression, hyperlipidemia, hypertension        Review of Systems   Constitutional: Negative.  Negative for fever.   HENT: Negative.     Eyes: Negative.    Respiratory: Negative.     Cardiovascular: Negative.    Gastrointestinal:  Positive for abdominal distention, abdominal pain, constipation and nausea. Negative for diarrhea and vomiting.   Endocrine: Negative.    Genitourinary: Negative.    Musculoskeletal: Negative.    Skin: Negative.    Allergic/Immunologic: Negative.    Neurological: Negative.    Hematological: Negative.    Psychiatric/Behavioral: Negative.     All other systems reviewed and are negative.      Past Medical History:   Diagnosis Date    Anxiety     CHF (congestive heart failure)     Acute    Depression     Elevated cholesterol     Hypertension        No Known Allergies    Past Surgical History:   Procedure Laterality Date    AORTOGRAM N/A 07/13/2022    Procedure: LEFT COMMON FEMORAL ENDARTERECTOMY WITH BILATERAL ILIAC STENTING;  Surgeon: Esequiel Vaz DO;  Location: [facility] OR ;  Service: Vascular;  Laterality: N/A;    CARDIAC CATHETERIZATION      FEMORAL ENDARTERECTOMY Left 07/13/2022    Procedure: LEFT COMMON FEMORAL ENDARTERECTOMY WITH BILATERAL ILIAC STENTING;  Surgeon: Esequiel Vaz DO;  Location: [facility] OR ;  Service: Vascular;  Laterality: Left;    KNEE ARTHROSCOPY Left        Family History   Problem Relation Age of Onset    Heart attack Father        Social History     Socioeconomic History    Marital status: Single   Tobacco Use    Smoking  status: Every Day     Current packs/day: 1.00     Average packs/day: 1 pack/day for 50.5 years (50.5 ttl pk-yrs)     Types: Cigarettes     Start date: 1975     Passive exposure: Current    Smokeless tobacco: Never    Tobacco comments:     Pt states he has cut back to 14 cigarettes per day.   Vaping Use    Vaping status: Never Used   Substance and Sexual Activity    Alcohol use: Yes     Comment: occasionally    Drug use: Never    Sexual activity: Defer           Objective   Physical Exam  Vitals and nursing note reviewed.   Constitutional:       General: He is in acute distress.      Appearance: He is well-developed. He is ill-appearing. He is not diaphoretic.      Comments: Patient sitting up on the side of the bed complaining of abdominal pain   HENT:      Head: Normocephalic and atraumatic.      Nose: Nose normal.   Eyes:      General: No scleral icterus.     Conjunctiva/sclera: Conjunctivae normal.      Pupils: Pupils are equal, round, and reactive to light.   Neck:      Thyroid: No thyromegaly.      Vascular: No JVD.   Cardiovascular:      Rate and Rhythm: Normal rate and regular rhythm.      Heart sounds: Normal heart sounds. No murmur heard.  Pulmonary:      Effort: Pulmonary effort is normal. No respiratory distress.      Breath sounds: Normal breath sounds. No wheezing or rales.   Chest:      Chest wall: No tenderness.   Abdominal:      General: Bowel sounds are normal. There is distension.      Palpations: Abdomen is rigid. There is no mass.      Tenderness: There is generalized abdominal tenderness. There is no guarding or rebound.      Comments: Abdomen appears distended and hard on exam   Musculoskeletal:         General: Normal range of motion.      Cervical back: Normal range of motion and neck supple.   Lymphadenopathy:      Cervical: No cervical adenopathy.   Skin:     General: Skin is warm and dry.      Coloration: Skin is not pale.      Findings: No erythema or rash.   Neurological:      Mental  Status: He is alert and oriented to person, place, and time.      Cranial Nerves: No cranial nerve deficit.      Coordination: Coordination normal.      Deep Tendon Reflexes: Reflexes are normal and symmetric.   Psychiatric:         Behavior: Behavior normal.         Thought Content: Thought content normal.         Judgment: Judgment normal.         Procedures           ED Course                                                       Medical Decision Making  Patient is a 70-year-old male who presents to the ER with chief complaints of abdominal pain.  He states he began experiencing pain a few weeks ago.  Reports nausea without any vomiting.  He reports constipation, no diarrhea.  Denies any recorded fevers.  Due to symptoms described came to the ER for evaluation and treatment.  Past medical history significant for anxiety, CHF, depression, hyperlipidemia, hypertension  Differential diagnosis includes but not limited to constipation, bowel obstruction, free air, CHF exacerbation, and other etiologies    Problems Addressed:  Eloped from emergency department: complicated acute illness or injury  Generalized abdominal pain: complicated acute illness or injury    Amount and/or Complexity of Data Reviewed  Radiology: ordered.      Patient presented with abdominal pain for the past few weeks.  He has abdominal distention and abdomen appears hard.  I had ordered labs, pain medication, stat x-ray as well as CT scan of the abdomen pelvis.  Patient apparently eloped and left the emergency department prior to workup performed.  Final diagnoses:   Eloped from emergency department   Generalized abdominal pain   Abdominal distention       ED Disposition  ED Disposition       ED Disposition   Eloped    Condition   --    Comment   --               No follow-up provider specified.       Medication List      No changes were made to your prescriptions during this visit.            Mena Vargas, APRN  06/23/25 [id number]

## 2025-07-07 ENCOUNTER — APPOINTMENT (OUTPATIENT)
Dept: CT IMAGING | Facility: HOSPITAL | Age: 70
End: 2025-07-07
Payer: MEDICARE

## 2025-07-07 ENCOUNTER — HOSPITAL ENCOUNTER (EMERGENCY)
Facility: HOSPITAL | Age: 70
Discharge: HOME | End: 2025-07-07
Attending: EMERGENCY MEDICINE | Admitting: EMERGENCY MEDICINE
Payer: MEDICARE

## 2025-07-07 VITALS
SYSTOLIC BLOOD PRESSURE: 141 MMHG | WEIGHT: 187 LBS | OXYGEN SATURATION: 98 % | HEIGHT: 66 IN | RESPIRATION RATE: 18 BRPM | TEMPERATURE: 97.8 F | HEART RATE: 92 BPM | DIASTOLIC BLOOD PRESSURE: 89 MMHG | BODY MASS INDEX: 30.05 KG/M2

## 2025-07-07 DIAGNOSIS — F19.90 RECREATIONAL DRUG USE: ICD-10-CM

## 2025-07-07 DIAGNOSIS — R23.8 SKIN BREAKDOWN: ICD-10-CM

## 2025-07-07 DIAGNOSIS — I10 PRIMARY HYPERTENSION: ICD-10-CM

## 2025-07-07 DIAGNOSIS — I73.9 PERIPHERAL VASCULAR DISEASE: Primary | ICD-10-CM

## 2025-07-07 LAB
ALBUMIN SERPL-MCNC: 3.7 G/DL (ref 3.5–5.2)
ALBUMIN/GLOB SERPL: 1 G/DL
ALP SERPL-CCNC: 188 U/L (ref 39–117)
ALT SERPL W P-5'-P-CCNC: 31 U/L (ref 1–41)
AMPHET+METHAMPHET UR QL: POSITIVE
AMPHETAMINES UR QL: POSITIVE
ANION GAP SERPL CALCULATED.3IONS-SCNC: 11 MMOL/L (ref 5–15)
APTT PPP: 29.4 SECONDS (ref 24.5–36)
AST SERPL-CCNC: 40 U/L (ref 1–40)
BARBITURATES UR QL SCN: NEGATIVE
BASOPHILS # BLD AUTO: 0.11 10*3/MM3 (ref 0–0.2)
BASOPHILS NFR BLD AUTO: 1.4 % (ref 0–1.5)
BENZODIAZ UR QL SCN: POSITIVE
BILIRUB SERPL-MCNC: 0.9 MG/DL (ref 0–1.2)
BUN SERPL-MCNC: 22.6 MG/DL (ref 8–23)
BUN/CREAT SERPL: 13.2 (ref 7–25)
BUPRENORPHINE SERPL-MCNC: NEGATIVE NG/ML
CALCIUM SPEC-SCNC: 9.7 MG/DL (ref 8.6–10.5)
CANNABINOIDS SERPL QL: POSITIVE
CHLORIDE SERPL-SCNC: 97 MMOL/L (ref 98–107)
CK SERPL-CCNC: 147 U/L (ref 20–200)
CO2 SERPL-SCNC: 24 MMOL/L (ref 22–29)
COCAINE UR QL: NEGATIVE
CREAT SERPL-MCNC: 1.71 MG/DL (ref 0.76–1.27)
DEPRECATED RDW RBC AUTO: 44.3 FL (ref 37–54)
EGFRCR SERPLBLD CKD-EPI 2021: 43.1 ML/MIN/1.73
EOSINOPHIL # BLD AUTO: 0.18 10*3/MM3 (ref 0–0.4)
EOSINOPHIL NFR BLD AUTO: 2.4 % (ref 0.3–6.2)
ERYTHROCYTE [DISTWIDTH] IN BLOOD BY AUTOMATED COUNT: 16.3 % (ref 12.3–15.4)
FENTANYL UR-MCNC: NEGATIVE NG/ML
GLOBULIN UR ELPH-MCNC: 3.8 GM/DL
GLUCOSE SERPL-MCNC: 221 MG/DL (ref 65–99)
HCT VFR BLD AUTO: 53.1 % (ref 37.5–51)
HGB BLD-MCNC: 16.7 G/DL (ref 13–17.7)
HOLD SPECIMEN: NORMAL
IMM GRANULOCYTES # BLD AUTO: 0.06 10*3/MM3 (ref 0–0.05)
IMM GRANULOCYTES NFR BLD AUTO: 0.8 % (ref 0–0.5)
INR PPP: 0.98 (ref 0.91–1.09)
LYMPHOCYTES # BLD AUTO: 1.72 10*3/MM3 (ref 0.7–3.1)
LYMPHOCYTES NFR BLD AUTO: 22.6 % (ref 19.6–45.3)
MAGNESIUM SERPL-MCNC: 2 MG/DL (ref 1.6–2.4)
MCH RBC QN AUTO: 25.9 PG (ref 26.6–33)
MCHC RBC AUTO-ENTMCNC: 31.5 G/DL (ref 31.5–35.7)
MCV RBC AUTO: 82.2 FL (ref 79–97)
METHADONE UR QL SCN: NEGATIVE
MONOCYTES # BLD AUTO: 0.74 10*3/MM3 (ref 0.1–0.9)
MONOCYTES NFR BLD AUTO: 9.7 % (ref 5–12)
NEUTROPHILS NFR BLD AUTO: 4.81 10*3/MM3 (ref 1.7–7)
NEUTROPHILS NFR BLD AUTO: 63.1 % (ref 42.7–76)
NRBC BLD AUTO-RTO: 0 /100 WBC (ref 0–0.2)
OPIATES UR QL: NEGATIVE
OXYCODONE UR QL SCN: NEGATIVE
PCP UR QL SCN: NEGATIVE
PLATELET # BLD AUTO: 218 10*3/MM3 (ref 140–450)
PMV BLD AUTO: 11.7 FL (ref 6–12)
POTASSIUM SERPL-SCNC: 4.6 MMOL/L (ref 3.5–5.2)
PROT SERPL-MCNC: 7.5 G/DL (ref 6–8.5)
PROTHROMBIN TIME: 13.4 SECONDS (ref 11.8–14.8)
RBC # BLD AUTO: 6.46 10*6/MM3 (ref 4.14–5.8)
SODIUM SERPL-SCNC: 132 MMOL/L (ref 136–145)
TRICYCLICS UR QL SCN: NEGATIVE
UFH PPP CHRO-ACNC: <0.1 IU/ML (ref 0.3–0.7)
WBC NRBC COR # BLD AUTO: 7.62 10*3/MM3 (ref 3.4–10.8)
WHOLE BLOOD HOLD COAG: NORMAL
WHOLE BLOOD HOLD SPECIMEN: NORMAL

## 2025-07-07 PROCEDURE — 25510000001 IOPAMIDOL PER 1 ML: Performed by: EMERGENCY MEDICINE

## 2025-07-07 PROCEDURE — 25010000002 HYDROMORPHONE PER 4 MG: Performed by: EMERGENCY MEDICINE

## 2025-07-07 PROCEDURE — 75635 CT ANGIO ABDOMINAL ARTERIES: CPT

## 2025-07-07 PROCEDURE — 85730 THROMBOPLASTIN TIME PARTIAL: CPT | Performed by: EMERGENCY MEDICINE

## 2025-07-07 PROCEDURE — 82550 ASSAY OF CK (CPK): CPT | Performed by: EMERGENCY MEDICINE

## 2025-07-07 PROCEDURE — 96376 TX/PRO/DX INJ SAME DRUG ADON: CPT

## 2025-07-07 PROCEDURE — 25010000002 ONDANSETRON PER 1 MG: Performed by: EMERGENCY MEDICINE

## 2025-07-07 PROCEDURE — 80053 COMPREHEN METABOLIC PANEL: CPT | Performed by: EMERGENCY MEDICINE

## 2025-07-07 PROCEDURE — 85520 HEPARIN ASSAY: CPT | Performed by: EMERGENCY MEDICINE

## 2025-07-07 PROCEDURE — 25010000002 HEPARIN (PORCINE) 25000-0.45 UT/250ML-% SOLUTION: Performed by: EMERGENCY MEDICINE

## 2025-07-07 PROCEDURE — 96375 TX/PRO/DX INJ NEW DRUG ADDON: CPT

## 2025-07-07 PROCEDURE — 25010000002 HEPARIN (PORCINE) PER 1000 UNITS: Performed by: EMERGENCY MEDICINE

## 2025-07-07 PROCEDURE — 96365 THER/PROPH/DIAG IV INF INIT: CPT

## 2025-07-07 PROCEDURE — 85025 COMPLETE CBC W/AUTO DIFF WBC: CPT | Performed by: EMERGENCY MEDICINE

## 2025-07-07 PROCEDURE — 80307 DRUG TEST PRSMV CHEM ANLYZR: CPT | Performed by: EMERGENCY MEDICINE

## 2025-07-07 PROCEDURE — 85610 PROTHROMBIN TIME: CPT | Performed by: EMERGENCY MEDICINE

## 2025-07-07 PROCEDURE — 99285 EMERGENCY DEPT VISIT HI MDM: CPT | Performed by: EMERGENCY MEDICINE

## 2025-07-07 PROCEDURE — 83735 ASSAY OF MAGNESIUM: CPT | Performed by: EMERGENCY MEDICINE

## 2025-07-07 PROCEDURE — 25010000002 LABETALOL 5 MG/ML SOLUTION: Performed by: EMERGENCY MEDICINE

## 2025-07-07 PROCEDURE — 93005 ELECTROCARDIOGRAM TRACING: CPT | Performed by: EMERGENCY MEDICINE

## 2025-07-07 RX ORDER — LABETALOL HYDROCHLORIDE 5 MG/ML
20 INJECTION, SOLUTION INTRAVENOUS ONCE
Status: COMPLETED | OUTPATIENT
Start: 2025-07-07 | End: 2025-07-07

## 2025-07-07 RX ORDER — DILTIAZEM HYDROCHLORIDE 5 MG/ML
10 INJECTION INTRAVENOUS ONCE
Status: DISCONTINUED | OUTPATIENT
Start: 2025-07-07 | End: 2025-07-07 | Stop reason: HOSPADM

## 2025-07-07 RX ORDER — HEPARIN SODIUM 1000 [USP'U]/ML
4000 INJECTION, SOLUTION INTRAVENOUS; SUBCUTANEOUS AS NEEDED
Status: DISCONTINUED | OUTPATIENT
Start: 2025-07-07 | End: 2025-07-07 | Stop reason: HOSPADM

## 2025-07-07 RX ORDER — ONDANSETRON 2 MG/ML
4 INJECTION INTRAMUSCULAR; INTRAVENOUS ONCE
Status: COMPLETED | OUTPATIENT
Start: 2025-07-07 | End: 2025-07-07

## 2025-07-07 RX ORDER — HEPARIN SODIUM 1000 [USP'U]/ML
2000 INJECTION, SOLUTION INTRAVENOUS; SUBCUTANEOUS AS NEEDED
Status: DISCONTINUED | OUTPATIENT
Start: 2025-07-07 | End: 2025-07-07 | Stop reason: HOSPADM

## 2025-07-07 RX ORDER — HEPARIN SODIUM 1000 [USP'U]/ML
4000 INJECTION, SOLUTION INTRAVENOUS; SUBCUTANEOUS ONCE
Status: COMPLETED | OUTPATIENT
Start: 2025-07-07 | End: 2025-07-07

## 2025-07-07 RX ORDER — IOPAMIDOL 755 MG/ML
125 INJECTION, SOLUTION INTRAVASCULAR
Status: COMPLETED | OUTPATIENT
Start: 2025-07-07 | End: 2025-07-07

## 2025-07-07 RX ORDER — HEPARIN SODIUM 10000 [USP'U]/100ML
11.8 INJECTION, SOLUTION INTRAVENOUS
Status: DISCONTINUED | OUTPATIENT
Start: 2025-07-07 | End: 2025-07-07 | Stop reason: HOSPADM

## 2025-07-07 RX ORDER — HYDROMORPHONE HYDROCHLORIDE 1 MG/ML
0.5 INJECTION, SOLUTION INTRAMUSCULAR; INTRAVENOUS; SUBCUTANEOUS ONCE
Refills: 0 | Status: COMPLETED | OUTPATIENT
Start: 2025-07-07 | End: 2025-07-07

## 2025-07-07 RX ORDER — MUPIROCIN 2 %
1 OINTMENT (GRAM) TOPICAL 3 TIMES DAILY
Qty: 22 G | Refills: 0 | Status: SHIPPED | OUTPATIENT
Start: 2025-07-07 | End: 2025-07-14

## 2025-07-07 RX ADMIN — HEPARIN SODIUM 11.8 UNITS/KG/HR: 10000 INJECTION, SOLUTION INTRAVENOUS at 09:14

## 2025-07-07 RX ADMIN — IOPAMIDOL 125 ML: 755 INJECTION, SOLUTION INTRAVENOUS at 09:27

## 2025-07-07 RX ADMIN — HEPARIN SODIUM 4000 UNITS: 1000 INJECTION, SOLUTION INTRAVENOUS; SUBCUTANEOUS at 09:14

## 2025-07-07 RX ADMIN — ONDANSETRON 4 MG: 2 INJECTION, SOLUTION INTRAMUSCULAR; INTRAVENOUS at 10:04

## 2025-07-07 RX ADMIN — LABETALOL HYDROCHLORIDE 20 MG: 5 INJECTION, SOLUTION INTRAVENOUS at 09:19

## 2025-07-07 RX ADMIN — HYDROMORPHONE HYDROCHLORIDE 0.5 MG: 1 INJECTION, SOLUTION INTRAMUSCULAR; INTRAVENOUS; SUBCUTANEOUS at 10:04

## 2025-07-07 NOTE — DISCHARGE INSTRUCTIONS
It was very nice to meet you, Campbell. Thank you for allowing us to take care of you today at [facility].     Today you were seen in the emergency department for your symptoms. Please understand that an ER evaluation is just the start of your evaluation. We do the best we can, but we are often unable to fully find what is causing your symptoms from one evaluation.  Because of this, the goal is to determine whether you need to be evaluated in the hospital or if it is safe for you to go home and see other doctors provided such as primary care physicians or specialist on an outpatient basis.      Like we discussed, I strongly urge that you follow up with your primary care doctor. Please call their office to set up an appointment as soon as possible so that you can be re-evaluated for improvement in your symptoms or for any other questions.  I have provided the information needed, including phone number, to call to set up an appointment below in these discharge papers.      Educational material has also been provided in the following pages regarding what we have discussed today.      Please return to the emergency room within 12-48 hours if you experience symptoms such as the following:   Fever, chills, chest pain or shortness of breath, pain with inspiration/expiration, pain that travels to your arms, neck or back, nausea, vomiting, severe headache, tearing pain in your chest, dizziness, feel as though you are about to pass out, OR if you have any worsening symptoms, or any other concerns.

## 2025-07-07 NOTE — ED PROVIDER NOTES
Subjective   History of Present Illness  Patient has got a history of peripheral vascular disease has had carotid surgery in the past.  Also has moderate peripheral vascular disease of the lower extremities which has been monitored.  Came to the ED with 1 month history increasing pain in the lower legs bilaterally but diminished pulses not dopplerable.  Was heparinized CTs are obtained.    Leg Swelling  Location:  Bilateral leg pain  Severity:  Moderate  Onset quality:  Gradual  Duration:  4 weeks  Timing:  Constant  Progression:  Worsening  Chronicity:  New  Associated symptoms: no abdominal pain, no chest pain, no congestion, no cough, no fatigue, no fever, no headaches, no loss of consciousness, no nausea, no rhinorrhea, no shortness of breath, no sore throat and no vomiting        Review of Systems   Constitutional: Negative.  Negative for chills, fatigue and fever.   HENT: Negative.  Negative for congestion, rhinorrhea and sore throat.    Respiratory: Negative.  Negative for cough, chest tightness, shortness of breath and stridor.    Cardiovascular: Negative.  Negative for chest pain.   Gastrointestinal: Negative.  Negative for abdominal distention, abdominal pain, nausea and vomiting.   Endocrine: Negative.    Genitourinary: Negative.  Negative for difficulty urinating and flank pain.   Musculoskeletal: Negative.    Skin: Negative.  Negative for color change.   Neurological: Negative.  Negative for dizziness, loss of consciousness and headaches.   All other systems reviewed and are negative.      Past Medical History:   Diagnosis Date    Anxiety     CHF (congestive heart failure)     Acute    Depression     Elevated cholesterol     Hypertension        No Known Allergies    Past Surgical History:   Procedure Laterality Date    AORTOGRAM N/A 07/13/2022    Procedure: LEFT COMMON FEMORAL ENDARTERECTOMY WITH BILATERAL ILIAC STENTING;  Surgeon: Esequiel Vaz DO;  Location: [place] [id number];  Service:  Vascular;  Laterality: N/A;    CARDIAC CATHETERIZATION      FEMORAL ENDARTERECTOMY Left 07/13/2022    Procedure: LEFT COMMON FEMORAL ENDARTERECTOMY WITH BILATERAL ILIAC STENTING;  Surgeon: Esequiel Vaz DO;  Location:  [facility];  Service: Vascular;  Laterality: Left;    KNEE ARTHROSCOPY Left        Family History   Problem Relation Age of Onset    Heart attack Father        Social History     Socioeconomic History    Marital status: Single   Tobacco Use    Smoking status: Every Day     Current packs/day: 1.00     Average packs/day: 1 pack/day for 50.5 years (50.5 ttl pk-yrs)     Types: Cigarettes     Start date: 1975     Passive exposure: Current    Smokeless tobacco: Never    Tobacco comments:     Pt states he has cut back to 14 cigarettes per day.   Vaping Use    Vaping status: Never Used   Substance and Sexual Activity    Alcohol use: Yes     Comment: occasionally    Drug use: Never    Sexual activity: Defer           Objective   Physical Exam  Vitals and nursing note reviewed. Exam conducted with a chaperone present.   Constitutional:       General: He is awake.      Appearance: Normal appearance. He is well-developed. He is not ill-appearing.   HENT:      Head: Normocephalic and atraumatic.   Eyes:      General: Lids are normal.      Conjunctiva/sclera: Conjunctivae normal.      Pupils: Pupils are equal, round, and reactive to light.   Cardiovascular:      Rate and Rhythm: Regular rhythm. Tachycardia present.      Chest Wall: PMI is not displaced.      Pulses:           Femoral pulses are 2+ on the right side and 2+ on the left side.       Popliteal pulses are 2+ on the right side and 2+ on the left side.        Dorsalis pedis pulses are 0 on the right side and 0 on the left side.        Posterior tibial pulses are 0 on the right side and 0 on the left side.      Heart sounds: Normal heart sounds.      No S3 sounds.      Comments: Cannot palpate a pulse or get a very good Doppler pulse on this  extremity.  Pulmonary:      Effort: Pulmonary effort is normal.      Breath sounds: Normal breath sounds. No decreased breath sounds.   Abdominal:      General: Abdomen is flat. Bowel sounds are normal.      Palpations: Abdomen is soft.      Tenderness: There is no abdominal tenderness.   Musculoskeletal:         General: Normal range of motion.      Cervical back: Full passive range of motion without pain, normal range of motion and neck supple.      Comments: Patient has got a small area of skin breakdown on the left lower extremity.  No ulcerations.  Patient got evidence of peripheral artery disease with duskiness of the toes and pallor.  No calf tenderness or swelling.  There is no gangrene noted.  No cyanosis noted.   Skin:     General: Skin is warm and dry.      Capillary Refill: Capillary refill takes less than 2 seconds.   Neurological:      General: No focal deficit present.      Mental Status: He is alert and oriented to person, place, and time.      GCS: GCS eye subscore is 4. GCS verbal subscore is 5. GCS motor subscore is 6.      Cranial Nerves: Cranial nerves 2-12 are intact. No cranial nerve deficit.      Sensory: Sensory deficit present.      Motor: Motor function is intact.      Deep Tendon Reflexes: Reflexes are normal and symmetric.   Psychiatric:         Behavior: Behavior is cooperative.         Procedures           ED Course  ED Course as of 07/07/25 1141   Mon Jul 07, 2025   1016 nsr [TS]   1133 Initially was heparinized because he had decreased pulses or diminished pulses and decreased cap refill with [facility] perception was which was somewhat diminished.  I am not sure whether the sensory perception is diminished because of the patient not understanding what I am asking.  Or actually is.  There is no evidence of gangrene.  There is some duskiness of the tips of the toes on the left 4th and 5th but there is no gangrene.  And cap refill is diminished. [TS]   1134 Workup was performed the patient  has blood pressure elevated for which she was given labetalol and diltiazem.  He was placed on heparin initially.  Which was discontinued after consultation with the vascular surgery.  His hemoglobin hematocrit is within normal limits. [TS]   1135 He does have baseline renal insufficiency.  His urine drug screen is positive for meth and marijuana. [TS]   1135 Back to talk to this patient and the patient had couple of family members or caregivers in the room patient is awake and alert and I requested for him to let me know if I have to ask the relatives of the caregivers or friends to leave the room before we discussed everything with him regarding his workup the patient wanted these individuals to stay in the room. [TS]   1137 The entire CT report was discussed with the patient and incidental findings also discussed the patient. [TS]   1137 He does have risk of peripheral vascular disease with abnormal CT angiograms the CT angiogram was reviewed by Dr. Milind Vaz reviewed the pictures and the films and advised that the patient as far as vascular surgery is concerned can be discharged home and followed up with him as an outpatient.  He does not need to be admitted today. [TS]   1138 I have discussed this case with the patient his blood pressure is improved we will discharge him home continue with the home medications keep a blood pressure diary return the ER for any worsening symptoms we will put him on some topical antibiotics for the skin breakdown that he has there is no ulceration there is no cellulitis noted at this time. [TS]      ED Course User Index  [TS] Aftab Starr MD                                                       Medical Decision Making  Peripheral vascular disease lower extremity pain decreased pulses patient came to the ED got a CT angio which was read by Dr. Vaz recommendation by Dr. Vaz was the patient can be discharged home with a follow-up as an outpatient with him.  The  patient blood pressure was under better control with the medication we gave his heparin was discontinued the patient to be discharged home patient agreeable with this plan of care.    Problems Addressed:  Peripheral vascular disease: chronic illness or injury  Primary hypertension: chronic illness or injury with exacerbation, progression, or side effects of treatment  Recreational drug use: complicated acute illness or injury  Skin breakdown: acute illness or injury    Amount and/or Complexity of Data Reviewed  Labs: ordered.     Details: Labs reviewed  Radiology: ordered.     Details: Scans reviewed  ECG/medicine tests: ordered.  Discussion of management or test interpretation with external provider(s): Cussed with Dr. Vaz at length.    Risk  Prescription drug management.  Risk Details: Patient came in with some leg pain.  Which is chronic he is on anticoagulation antiplatelet agents.  Needs to follow with the primary MD and his vascular surgery as an outpatient.  And to return there for any worsening symptoms.        Final diagnoses:   Peripheral vascular disease   Skin breakdown   Primary hypertension   Recreational drug use       ED Disposition  ED Disposition       ED Disposition   Discharge    Condition   Stable    Comment   --               Yaa Fox, APRN  [address] [facility] [address] [phone]    Schedule an appointment as soon as possible for a visit       Esequiel Vaz, DO  [address] [id number] [phone]    Schedule an appointment as soon as possible for a visit in 2 days           Medication List        New Prescriptions      mupirocin 2 % ointment  Commonly known as: BACTROBAN  Apply 1 Application topically to the appropriate area as directed 3 (Three) Times a Day for 7 days.               Where to Get Your Medications        These medications were sent to [facility]  [place], [address] [phone] [facility] [phone] [facility]  [facility] [address]      Phone: [phone]   mupirocin 2 % ointment            Aftab Starr MD  07/07/25 0852       Aftab Starr MD  07/07/25 1144

## 2025-07-12 LAB
QT INTERVAL: 416 MS
QTC INTERVAL: 508 MS

## 2025-07-18 ENCOUNTER — TELEPHONE (OUTPATIENT)
Dept: CARDIOLOGY | Facility: HOSPITAL | Age: 70
End: 2025-07-18

## 2025-07-26 PROCEDURE — 93005 ELECTROCARDIOGRAM TRACING: CPT

## 2025-07-26 PROCEDURE — 99283 EMERGENCY DEPT VISIT LOW MDM: CPT | Performed by: STUDENT IN AN ORGANIZED HEALTH CARE EDUCATION/TRAINING PROGRAM

## 2025-07-26 PROCEDURE — 93005 ELECTROCARDIOGRAM TRACING: CPT | Performed by: STUDENT IN AN ORGANIZED HEALTH CARE EDUCATION/TRAINING PROGRAM

## 2025-07-26 PROCEDURE — 96374 THER/PROPH/DIAG INJ IV PUSH: CPT

## 2025-07-26 PROCEDURE — 36415 COLL VENOUS BLD VENIPUNCTURE: CPT

## 2025-07-27 ENCOUNTER — HOSPITAL ENCOUNTER (EMERGENCY)
Facility: HOSPITAL | Age: 70
Discharge: HOME | End: 2025-07-27
Attending: STUDENT IN AN ORGANIZED HEALTH CARE EDUCATION/TRAINING PROGRAM | Admitting: STUDENT IN AN ORGANIZED HEALTH CARE EDUCATION/TRAINING PROGRAM
Payer: MEDICARE

## 2025-07-27 VITALS
RESPIRATION RATE: 20 BRPM | WEIGHT: 192 LBS | OXYGEN SATURATION: 94 % | HEART RATE: 95 BPM | BODY MASS INDEX: 31.99 KG/M2 | DIASTOLIC BLOOD PRESSURE: 98 MMHG | HEIGHT: 65 IN | SYSTOLIC BLOOD PRESSURE: 162 MMHG | TEMPERATURE: 98.1 F

## 2025-07-27 DIAGNOSIS — I10 HYPERTENSION, UNSPECIFIED TYPE: ICD-10-CM

## 2025-07-27 DIAGNOSIS — R00.0 TACHYCARDIA: Primary | ICD-10-CM

## 2025-07-27 LAB
ALBUMIN SERPL-MCNC: 3.8 G/DL (ref 3.5–5.2)
ALBUMIN/GLOB SERPL: 1 G/DL
ALP SERPL-CCNC: 190 U/L (ref 39–117)
ALT SERPL W P-5'-P-CCNC: 29 U/L (ref 1–41)
ANION GAP SERPL CALCULATED.3IONS-SCNC: 14 MMOL/L (ref 5–15)
AST SERPL-CCNC: 36 U/L (ref 1–40)
BASOPHILS # BLD AUTO: 0.15 10*3/MM3 (ref 0–0.2)
BASOPHILS NFR BLD AUTO: 1.2 % (ref 0–1.5)
BILIRUB SERPL-MCNC: 0.9 MG/DL (ref 0–1.2)
BUN SERPL-MCNC: 26.3 MG/DL (ref 8–23)
BUN/CREAT SERPL: 18.5 (ref 7–25)
CALCIUM SPEC-SCNC: 9.6 MG/DL (ref 8.6–10.5)
CHLORIDE SERPL-SCNC: 95 MMOL/L (ref 98–107)
CO2 SERPL-SCNC: 21 MMOL/L (ref 22–29)
CREAT SERPL-MCNC: 1.42 MG/DL (ref 0.76–1.27)
D DIMER PPP FEU-MCNC: 0.71 MCGFEU/ML (ref 0–0.68)
DEPRECATED RDW RBC AUTO: 43.9 FL (ref 37–54)
EGFRCR SERPLBLD CKD-EPI 2021: 53.8 ML/MIN/1.73
EOSINOPHIL # BLD AUTO: 0.13 10*3/MM3 (ref 0–0.4)
EOSINOPHIL NFR BLD AUTO: 1 % (ref 0.3–6.2)
ERYTHROCYTE [DISTWIDTH] IN BLOOD BY AUTOMATED COUNT: 17.2 % (ref 12.3–15.4)
GEN 5 1HR TROPONIN T REFLEX: 60 NG/L
GLOBULIN UR ELPH-MCNC: 3.8 GM/DL
GLUCOSE SERPL-MCNC: 307 MG/DL (ref 65–99)
HCT VFR BLD AUTO: 51.4 % (ref 37.5–51)
HGB BLD-MCNC: 16.7 G/DL (ref 13–17.7)
IMM GRANULOCYTES # BLD AUTO: 0.06 10*3/MM3 (ref 0–0.05)
IMM GRANULOCYTES NFR BLD AUTO: 0.5 % (ref 0–0.5)
LYMPHOCYTES # BLD AUTO: 2.26 10*3/MM3 (ref 0.7–3.1)
LYMPHOCYTES NFR BLD AUTO: 18.1 % (ref 19.6–45.3)
MAGNESIUM SERPL-MCNC: 2.1 MG/DL (ref 1.6–2.4)
MCH RBC QN AUTO: 25.5 PG (ref 26.6–33)
MCHC RBC AUTO-ENTMCNC: 32.5 G/DL (ref 31.5–35.7)
MCV RBC AUTO: 78.4 FL (ref 79–97)
MONOCYTES # BLD AUTO: 0.91 10*3/MM3 (ref 0.1–0.9)
MONOCYTES NFR BLD AUTO: 7.3 % (ref 5–12)
NEUTROPHILS NFR BLD AUTO: 71.9 % (ref 42.7–76)
NEUTROPHILS NFR BLD AUTO: 9 10*3/MM3 (ref 1.7–7)
NRBC BLD AUTO-RTO: 0 /100 WBC (ref 0–0.2)
PHOSPHATE SERPL-MCNC: 3.1 MG/DL (ref 2.5–4.5)
PLATELET # BLD AUTO: 188 10*3/MM3 (ref 140–450)
PMV BLD AUTO: 11.2 FL (ref 6–12)
POTASSIUM SERPL-SCNC: 4.8 MMOL/L (ref 3.5–5.2)
PROT SERPL-MCNC: 7.6 G/DL (ref 6–8.5)
RBC # BLD AUTO: 6.56 10*6/MM3 (ref 4.14–5.8)
SODIUM SERPL-SCNC: 130 MMOL/L (ref 136–145)
TROPONIN T % DELTA: -3
TROPONIN T NUMERIC DELTA: -2 NG/L
TROPONIN T SERPL HS-MCNC: 62 NG/L
TSH SERPL DL<=0.05 MIU/L-ACNC: 3.69 UIU/ML (ref 0.27–4.2)
WBC NRBC COR # BLD AUTO: 12.51 10*3/MM3 (ref 3.4–10.8)

## 2025-07-27 PROCEDURE — 84100 ASSAY OF PHOSPHORUS: CPT | Performed by: STUDENT IN AN ORGANIZED HEALTH CARE EDUCATION/TRAINING PROGRAM

## 2025-07-27 PROCEDURE — 36415 COLL VENOUS BLD VENIPUNCTURE: CPT

## 2025-07-27 PROCEDURE — 84484 ASSAY OF TROPONIN QUANT: CPT | Performed by: STUDENT IN AN ORGANIZED HEALTH CARE EDUCATION/TRAINING PROGRAM

## 2025-07-27 PROCEDURE — 25010000002 METOPROLOL TARTRATE 5 MG/5ML SOLUTION: Performed by: STUDENT IN AN ORGANIZED HEALTH CARE EDUCATION/TRAINING PROGRAM

## 2025-07-27 PROCEDURE — 85025 COMPLETE CBC W/AUTO DIFF WBC: CPT | Performed by: STUDENT IN AN ORGANIZED HEALTH CARE EDUCATION/TRAINING PROGRAM

## 2025-07-27 PROCEDURE — 84443 ASSAY THYROID STIM HORMONE: CPT | Performed by: STUDENT IN AN ORGANIZED HEALTH CARE EDUCATION/TRAINING PROGRAM

## 2025-07-27 PROCEDURE — 96374 THER/PROPH/DIAG INJ IV PUSH: CPT

## 2025-07-27 PROCEDURE — 25810000003 SODIUM CHLORIDE 0.9 % SOLUTION: Performed by: STUDENT IN AN ORGANIZED HEALTH CARE EDUCATION/TRAINING PROGRAM

## 2025-07-27 PROCEDURE — 80053 COMPREHEN METABOLIC PANEL: CPT | Performed by: STUDENT IN AN ORGANIZED HEALTH CARE EDUCATION/TRAINING PROGRAM

## 2025-07-27 PROCEDURE — 85379 FIBRIN DEGRADATION QUANT: CPT | Performed by: STUDENT IN AN ORGANIZED HEALTH CARE EDUCATION/TRAINING PROGRAM

## 2025-07-27 PROCEDURE — 83735 ASSAY OF MAGNESIUM: CPT | Performed by: STUDENT IN AN ORGANIZED HEALTH CARE EDUCATION/TRAINING PROGRAM

## 2025-07-27 RX ORDER — METOPROLOL TARTRATE 1 MG/ML
5 INJECTION, SOLUTION INTRAVENOUS ONCE
Status: COMPLETED | OUTPATIENT
Start: 2025-07-27 | End: 2025-07-27

## 2025-07-27 RX ORDER — METOPROLOL TARTRATE 50 MG
50 TABLET ORAL ONCE
Status: COMPLETED | OUTPATIENT
Start: 2025-07-27 | End: 2025-07-27

## 2025-07-27 RX ADMIN — METOPROLOL TARTRATE 5 MG: 5 INJECTION INTRAVENOUS at 02:02

## 2025-07-27 RX ADMIN — METOPROLOL TARTRATE 50 MG: 50 TABLET, FILM COATED ORAL at 02:53

## 2025-07-27 RX ADMIN — SODIUM CHLORIDE 500 ML: 9 INJECTION, SOLUTION INTRAVENOUS at 02:04

## 2025-07-27 NOTE — ED PROVIDER NOTES
"Subjective   History of Present Illness  The patient is a 70-year-old male with a history of anxiety, hypertension, and congestive heart failure who presents to the ED with the chief complaint of palpitations. He describes his heart as \"running away\" and feeling \"real fast.\" The patient states that these symptoms started tonight and denies any similar episodes in the recent past, though he mentions it may have occurred \"a long time ago.\" He denies taking any specific medications to control his heart rate, though it was later discovered he is on metoprolol. The patient denies leg swelling, vomiting, blood in stools, diarrhea, fever, and reports only minimal trouble breathing. His current medications include amlodipine, atorvastatin, and metoprolol.        Review of Systems    Past Medical History:   Diagnosis Date    Anxiety     CHF (congestive heart failure)     Acute    Depression     Elevated cholesterol     Hypertension        No Known Allergies    Past Surgical History:   Procedure Laterality Date    AORTOGRAM N/A 07/13/2022    Procedure: LEFT COMMON FEMORAL ENDARTERECTOMY WITH BILATERAL ILIAC STENTING;  Surgeon: Esequiel Vaz DO;  Location: [facility] OR 12;  Service: Vascular;  Laterality: N/A;    CARDIAC CATHETERIZATION      FEMORAL ENDARTERECTOMY Left 07/13/2022    Procedure: LEFT COMMON FEMORAL ENDARTERECTOMY WITH BILATERAL ILIAC STENTING;  Surgeon: Esequiel Vaz DO;  Location: [facility] HYBRID OR 12;  Service: Vascular;  Laterality: Left;    KNEE ARTHROSCOPY Left        Family History   Problem Relation Age of Onset    Heart attack Father        Social History     Socioeconomic History    Marital status: Single   Tobacco Use    Smoking status: Every Day     Current packs/day: 1.00     Average packs/day: 1 pack/day for 50.6 years (50.6 ttl pk-yrs)     Types: Cigarettes     Start date: 1975     Passive exposure: Current    Smokeless tobacco: Never    Tobacco comments:     Pt states he has cut " back to 14 cigarettes per day.   Vaping Use    Vaping status: Never Used   Substance and Sexual Activity    Alcohol use: Yes     Comment: occasionally    Drug use: Never    Sexual activity: Defer           Objective   Physical Exam    Constitutional:  General: Patient is not in acute distress.  Appearance: Patient is not diaphoretic.    HENT:  Head: Normocephalic and atraumatic.  Right Ear: External ear normal.  Left Ear: External ear normal.  Nose: Nose normal.    Eyes:  General: No scleral icterus.  Conjunctiva/sclera: Conjunctivae normal.    Cardiovascular:  Rate and Rhythm: **Tachycardic with heart rate of 121**.  Heart sounds: No friction rub. Audible murmur present.     Pulmonary:  Effort: Pulmonary effort is normal. No respiratory distress.  Breath sounds: **Normal bilateral breath sounds**. No stridor. No wheezing.    Abd: soft and non tender.     Musculoskeletal:  General: No deformity.    Skin:  General: Skin is warm and dry. No rashes present. Normal color. Normal turgor.    Neurological:  General: No focal deficit present.  Mental Status: Alert and oriented.    Procedures           ED Course  ED Course as of 07/27/25 0419   Sun Jul 27, 2025   0130 Nasal EKG positive for sinus tachycardia with a heart rate of 121 [SG]   0142 BP at the bedside is 170/100.  Heart rate is 106. [SG]   0145 Will give the patient home medication of metoprolol. [SG]   0240 Years criteria negative for concerns of PE. CTA not indicated. Tsh reassuring. Troponin initial 62. Pending repeat troponin. Mild elevation of WBC. No cough, no urinary symptoms. No abd pain, or vomiting.  [SG]   0248 Patient feels much better.  His heart rate at the bedside is now 102.  He is no longer having symptoms.  Will give him a p.o. dose metoprolol by mouth, no fever, no concerns for active infection.  The patient remains negative for concerns of chest pain, palpitations, no chest pain radiating to the back.  No concerns for aortic dissection [SG]    0401 Manual blood pressure for the patient was 160/90.  I had a long conversation with the patient including potential admission for concerns of elevated troponins, although second troponin was reassuring as the numbers going down.  He CKD is at baseline.  Again he has mild elevation of his WBC count however I have not found a source of infection or have concern for source of infection at this time.  D-dimer was 0.71, the reason why I did not get a CTA of the chest is because by years criteria this is not recommended.  The patient has a mild elevation of the glucose, however he is not in DKA.  There is no indication to acutely decrease his glucose in the emergency department.  He is stable for discharge at this time, and he would like to be discharged stating he does not want to be admitted.  We discussed strict return precautions to the emergency room.  We discussed following up with primary care doctor in the next 48 hours for reassessment.  He verbalized understanding [SG]      ED Course User Index  [SG] Felton Holden MD                                                       Medical Decision Making  The patient presented to the ED with acute onset palpitations and was found to have sinus tachycardia.    Rhythm Strip/Cardiac Monitor:  Cardiac Monitor was ordered for evaluation of possible dysrhythmia and demonstrates sinus tachycardia with a heart rate of 121 BPM.    Laboratory studies were ordered including CBC, chemistry, magnesium, phosphorus, CMP, troponin, D-dimer, and thyroid panel to evaluate for potential causes of tachycardia including anemia, electrolyte abnormalities, cardiac ischemia, pulmonary embolism, and hyperthyroidism.    EKG, on my independent interpretation, demonstrates sinus tachycardia at a rate of 121 BPM.    The patient has a history of CHF, hypertension, and anxiety, all of which could contribute to his current presentation. His vital signs showed elevated blood pressure and  tachycardia. The patient was considered for IV fluids despite his CHF history. His oxygen saturation was 98% with no significant respiratory distress.    The patient's presentation is concerning for several possible etiologies including cardiac arrhythmia, exacerbation of CHF, thyroid dysfunction, pulmonary embolism, or anxiety. Given his history of metoprolol use, medication non-compliance could also be contributing to his symptoms.    Problems Addressed:  Hypertension, unspecified type: complicated acute illness or injury  Tachycardia: complicated acute illness or injury    Amount and/or Complexity of Data Reviewed  Labs: ordered.  ECG/medicine tests: ordered.    Risk  Prescription drug management.        Final diagnoses:   Tachycardia   Hypertension, unspecified type       ED Disposition  ED Disposition       ED Disposition   Discharge    Condition   Stable    Comment   --               Yaa Fox, APRN  [address] [facility] [address] [phone]    In 2 days           Medication List      No changes were made to your prescriptions during this visit.            Felton Holden MD  07/27/25 0419

## 2025-07-27 NOTE — DISCHARGE INSTRUCTIONS
As discussed please follow-up with your primary care doctor for reassessment.  Please return immediately to the emergency room with worsening symptoms.

## 2025-07-28 LAB
QT INTERVAL: 338 MS
QTC INTERVAL: 479 MS

## (undated) DEVICE — SYR LL TP 10ML STRL

## (undated) DEVICE — 3M™ IOBAN™ 2 ANTIMICROBIAL INCISE DRAPE 6651EZ: Brand: IOBAN™ 2

## (undated) DEVICE — CLTH CLENS READYCLEANSE PERI CARE PK/5

## (undated) DEVICE — CANN VESL ACRN TP 4MM

## (undated) DEVICE — APPL CHLORAPREP HI/LITE 26ML ORNG

## (undated) DEVICE — SUT PROLN 5/0 C1 DA 24IN 8725H

## (undated) DEVICE — TRAP FLD MINIVAC MEGADYNE 100ML

## (undated) DEVICE — DRSNG SURESITE WNDW 4X4.5

## (undated) DEVICE — SUT MNCRYL 4/0 PS2 27IN UD MCP426H

## (undated) DEVICE — PROXIMATE RH ROTATING HEAD SKIN STAPLERS (35 WIDE) CONTAINS 35 STAINLESS STEEL STAPLES: Brand: PROXIMATE

## (undated) DEVICE — Device

## (undated) DEVICE — RADIFOCUS GLIDEWIRE ADVANTAGE GUIDEWIRE: Brand: GLIDEWIRE ADVANTAGE

## (undated) DEVICE — GLV SURG BIOGEL LTX PF 7 1/2

## (undated) DEVICE — DESTINATION RENAL GUIDING SHEATH: Brand: DESTINATION

## (undated) DEVICE — MODEL AT P65, P/N 701554-001KIT CONTENTS: HAND CONTROLLER, 3-WAY HIGH-PRESSURE STOPCOCK WITH ROTATING END AND PREMIUM HIGH-PRESSURE TUBING: Brand: ANGIOTOUCH® KIT

## (undated) DEVICE — DRP SPECIAL PROC 4PC W/FC POUCH

## (undated) DEVICE — ST MIC/INTRO ACC SHRP/NDL TUNG/TP NITNL 5F 45CM 7CM

## (undated) DEVICE — ANTIBACTERIAL UNDYED BRAIDED (POLYGLACTIN 910), SYNTHETIC ABSORBABLE SUTURE: Brand: COATED VICRYL

## (undated) DEVICE — CATH FLSH OMNI SFT 5F 90CM

## (undated) DEVICE — PAD ENDOVASCULAR: Brand: MEDLINE INDUSTRIES, INC.

## (undated) DEVICE — SUT SILK 2/0 SH 30IN K833H

## (undated) DEVICE — GLV SURG SENSICARE W/ALOE PF LF 7.5 STRL

## (undated) DEVICE — 3M™ STERI-STRIP™ REINFORCED ADHESIVE SKIN CLOSURES, R1547, 1/2 IN X 4 IN (12 MM X 100 MM), 6 STRIPS/ENVELOPE: Brand: 3M™ STERI-STRIP™

## (undated) DEVICE — TBG PENCL TELESCP MEGADYNE SMOKE EVAC 10FT

## (undated) DEVICE — PINNACLE R/O II INTRODUCER SHEATH WITH RADIOPAQUE MARKER: Brand: PINNACLE

## (undated) DEVICE — PAD MAJOR VASCULAR: Brand: MEDLINE INDUSTRIES, INC.

## (undated) DEVICE — GAUZE,SPONGE,4"X4",16PLY,XRAY,STRL,LF: Brand: MEDLINE

## (undated) DEVICE — SYR CONTRL PRESS/LO FIX/M/LL W/THMB/RNG 10ML

## (undated) DEVICE — BAPTIST TURNOVER KIT: Brand: MEDLINE INDUSTRIES, INC.

## (undated) DEVICE — SPNG GZ STRL 2S 4X4 12PLY

## (undated) DEVICE — NDL HYPO PRECISIONGLIDE REG 22G 1 1/2